# Patient Record
Sex: FEMALE | Race: WHITE | NOT HISPANIC OR LATINO | Employment: UNEMPLOYED | URBAN - METROPOLITAN AREA
[De-identification: names, ages, dates, MRNs, and addresses within clinical notes are randomized per-mention and may not be internally consistent; named-entity substitution may affect disease eponyms.]

---
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
## 2022-01-03 ENCOUNTER — OFFICE VISIT (OUTPATIENT)
Dept: DERMATOLOGY | Facility: CLINIC | Age: 22
End: 2022-01-03
Payer: COMMERCIAL

## 2022-01-03 VITALS — TEMPERATURE: 97.6 F | WEIGHT: 121 LBS | HEIGHT: 68 IN | BODY MASS INDEX: 18.34 KG/M2

## 2022-01-03 DIAGNOSIS — L21.9 SEBORRHEIC DERMATITIS: Primary | ICD-10-CM

## 2022-01-03 PROCEDURE — 99214 OFFICE O/P EST MOD 30 MIN: CPT | Performed by: DERMATOLOGY

## 2022-01-03 NOTE — PROGRESS NOTES
[facility] Follow Up Note    Patient Name: Az Wu  Encounter Date: 01/03/2022    Today's Chief Concerns:  Lizettecorrine Soriak Concern #1:  Follow up red patches       Current Medications:    Current Outpatient Medications:     cetirizine (ZyrTEC) 10 mg tablet, Take 10 mg by mouth as needed, Disp: , Rfl:     clindamycin-benzoyl peroxide (BENZACLIN) gel, In the morning as needed for spot treatment, Disp: 50 g, Rfl: 0    ibuprofen (MOTRIN) 200 mg tablet, Take 200 mg by mouth as needed, Disp: , Rfl:     tretinoin (RETIN-A) 0 025 % cream, Apply a pea size amount on the face at bed time  , Disp: 45 g, Rfl: 3    CONSTITUTIONAL:   Vitals:    01/03/22 1029   Temp: 97 6 °F (36 4 °C)   TempSrc: Temporal   Weight: 54 9 kg (121 lb)   Height: 5' 8" (1 727 m)           Specific Alerts:    Have you been seen by a [facility]atologist in the last 3 years? YES    Are you pregnant or planning to become pregnant? No    Are you currently or planning to be nursing or breast feeding? No    No Known Allergies    May we call your Preferred Phone number to discuss your specific medical information? YES    May we leave a detailed message that includes your specific medical information? YES    Have you traveled outside of the [facility] in the past 3 months? No    Do you currently have a pacemaker or defibrillator? No    Do you have any artificial heart valves, joints, plates, screws, rods, stents, pins, etc? No   - If Yes, were any placed within the last 2 years? Do you require any medications prior to a surgical procedure? No   - If Yes, for which procedure? - If Yes, what medications to you require? Are you taking any medications that cause you to bleed more easily ("blood thinners") No    Have you ever experienced a rapid heartbeat with epinephrine? No    Have you ever been treated with "gold" (gold sodium thiomalate) therapy?  No    [facility] can help with wrinkles, "laugh lines," facial volume loss, "double chin," "love handles," age spots, and more  Are you interested in learning today about some of the skin enhancement procedures that we offer? (If Yes, please provide more detail) No    Review of Systems:  Have you recently had or currently have any of the following? · Fever or chills: No  · Night Sweats: No  · Headaches: No  · Weight Gain: No  · Weight Loss: No  · Blurry Vision: No  · Nausea: No  · Vomiting: No  · Diarrhea: No  · Blood in Stool: No  · Abdominal Pain: No  · Itchy Skin: No  · Painful Joints: No  · Swollen Joints: No  · Muscle Pain: No  · Irregular Mole: No  · Sun Burn: No  · Dry Skin: YES  · Skin Color Changes: No  · Scar or Keloid: No  · Cold Sores/Fever Blisters: No  · Bacterial Infections/MRSA: No  · Anxiety: No  · Depression: No  · Suicidal or Homicidal Thoughts: No      PSYCH: Normal mood and affect  EYES: Normal conjunctiva  ENT: Normal lips and oral mucosa  CARDIOVASCULAR: No edema  RESPIRATORY: Normal respirations  HEME/LYMPH/IMMUNO:  No regional lymphadenopathy except as noted below in ASSESSMENT AND PLAN BY DIAGNOSIS    FULL ORGAN SYSTEM SKIN EXAM (SKIN)   Hair, Scalp, Ears, Face Normal except as noted below in Assessment       1  SEBORRHEIC DERMATITIS     Physical Exam:   (Anatomic Location); (Size and Morphological Description)  See photos of rash located on face   Pertinent Positives:   Pertinent Negatives: Additional History of Present Condition:  Patient notes rash usually comes and goes for a year now  Patient notes it gets worse during the winter time every winter; rash is red, irritated and sometimes cracks  Patient has tried Vaseline, Cetaphil heavy moisturizer with no improvements        Assessment and Plan:  Based on a thorough discussion of this condition and the management approach to it (including a comprehensive discussion of the known risks, side effects and potential benefits of treatment), the patient (family) agrees to implement the following specific plan:    · Recommend patient to start using prescribed ciclopirox 0 77% cream  Apply topically twice a day to nasal creases  · May continue using a heavy moisturizer daily  · Discontinue using acne medications until rash resolves so that affected area does not get more irritated  Seborrheic Dermatitis   Seborrheic dermatitis is a common, chronic or relapsing form of eczema/dermatitis that mainly affects the sebaceous, gland-rich regions of the scalp, face, and trunk  There are infantile and adult forms of seborrhoeic dermatitis  It is sometimes associated with psoriasis and, in that clinical scenario, may be referred to as "sebo-psoriasis "  Seborrheic dermatitis is also known as "seborrheic eczema "  Dandruff (also called "pityriasis capitis") is an uninflamed form of seborrhoeic dermatitis  Dandruff presents as bran-like scaly patches scattered within hair-bearing areas of the scalp  In an infant, this condition may be referred to as "cradle cap "  The cause of seborrheic dermatitis is not completely understood  It is associated with proliferation of various species of the skin commensal Malassezia, in its yeast (non-pathogenic) form  Its metabolites (such as the fatty acids oleic acid, malssezin, and indole-3-carbaldehyde) may cause an inflammatory reaction  Differences in skin barrier lipid content and function may account for individual presentations  Infantile Seborrheic Dermatitis  Infantile seborrheic dermatitis affects babies under the age of 1 months and usually resolves by 1012 months of age  Infantile seborrheic dermatitis causes "cradle cap" (diffuse, greasy scaling on scalp)  The rash may spread to affect armpit and groin folds (a type of "napkin dermatitis")  There may be associated salmon-pink colored patches that may flake or peel    The rash in this case is usually not especially itchy, so the baby often appears undisturbed by the rash, even when more generalized  Adult Seborrheic Dermatitis  Adult seborrheic dermatitis tends to begin in late adolescence; prevalence is greatest in young adults and in the elderly  It is more common in males than in females  The following factors are sometimes associated with severe adult seborrheic dermatitis:   Oily skin   Familial tendency to seborrhoeic dermatitis or a family history of psoriasis   Immunosuppression: organ transplant recipient, human immunodeficiency virus (HIV) infection and patients with lymphoma   Neurological and psychiatric diseases: Parkinson disease, tardive dyskinesia, depression, epilepsy, facial nerve palsy, spinal cord injury and congenital disorders such as Down syndrome   Treatment for psoriasis with psoralen and ultraviolet A (PUVA) therapy   Lack of sleep   Stressful events  In adults, seborrheic dermatitis may typically affect the scalp, face (creases around the nose, behind ears, within eyebrows) and upper trunk  Typical clinical features include:   Winter flares, improving in summer following sun exposure   Minimal itch most of the time   Combination oily and dry mid-facial skin   Ill-defined localized scaly patches or diffuse scale in the scalp   Blepharitis; scaly red eyelid margins   Brunswick-pink, thin, scaly, and ill-defined plaques in skin folds on both sides of the face   Petal or ring-shaped flaky patches on hair-line and on anterior chest   Rash in armpits, under the breasts, in the groin folds and genital creases   Superficial folliculitis (inflamed hair follicles) on cheeks and upper trunk    Seborrheic dermatitis is diagnosed by its clinical appearance and behavior  Skin biopsy may be helpful but is rarely necessary to make this diagnosis        Scribe Attestation    I,:  Lakhwinder Morelos MA am acting as a scribe while in the presence of the attending physician :       I,:  Bobbi Obrien MD personally performed the services described in this documentation as scribed in my presence :

## 2022-01-03 NOTE — PATIENT INSTRUCTIONS
SEBORRHEIC DERMATITIS     Assessment and Plan:  Based on a thorough discussion of this condition and the management approach to it (including a comprehensive discussion of the known risks, side effects and potential benefits of treatment), the patient (family) agrees to implement the following specific plan:    · Recommend patient to start using prescribed ciclopirox 0 77% cream  Apply topically twice a day to nasal creases  · May continue using a heavy moisturizer daily  · Discontinue using acne medications until rash resolves so that affected area does not get more irritated  Seborrheic Dermatitis   Seborrheic dermatitis is a common, chronic or relapsing form of eczema/dermatitis that mainly affects the sebaceous, gland-rich regions of the scalp, face, and trunk  There are infantile and adult forms of seborrhoeic dermatitis  It is sometimes associated with psoriasis and, in that clinical scenario, may be referred to as "sebo-psoriasis "  Seborrheic dermatitis is also known as "seborrheic eczema "  Dandruff (also called "pityriasis capitis") is an uninflamed form of seborrhoeic dermatitis  Dandruff presents as bran-like scaly patches scattered within hair-bearing areas of the scalp  In an infant, this condition may be referred to as "cradle cap "  The cause of seborrheic dermatitis is not completely understood  It is associated with proliferation of various species of the skin commensal Malassezia, in its yeast (non-pathogenic) form  Its metabolites (such as the fatty acids oleic acid, malssezin, and indole-3-carbaldehyde) may cause an inflammatory reaction  Differences in skin barrier lipid content and function may account for individual presentations  Infantile Seborrheic Dermatitis  Infantile seborrheic dermatitis affects babies under the age of 1 months and usually resolves by 1012 months of age  Infantile seborrheic dermatitis causes "cradle cap" (diffuse, greasy scaling on scalp)   The rash may spread to affect armpit and groin folds (a type of "napkin dermatitis")  There may be associated salmon-pink colored patches that may flake or peel  The rash in this case is usually not especially itchy, so the baby often appears undisturbed by the rash, even when more generalized  Adult Seborrheic Dermatitis  Adult seborrheic dermatitis tends to begin in late adolescence; prevalence is greatest in young adults and in the elderly  It is more common in males than in females  The following factors are sometimes associated with severe adult seborrheic dermatitis:   Oily skin   Familial tendency to seborrhoeic dermatitis or a family history of psoriasis   Immunosuppression: organ transplant recipient, human immunodeficiency virus (HIV) infection and patients with lymphoma   Neurological and psychiatric diseases: Parkinson disease, tardive dyskinesia, depression, epilepsy, facial nerve palsy, spinal cord injury and congenital disorders such as Down syndrome   Treatment for psoriasis with psoralen and ultraviolet A (PUVA) therapy   Lack of sleep   Stressful events  In adults, seborrheic dermatitis may typically affect the scalp, face (creases around the nose, behind ears, within eyebrows) and upper trunk  Typical clinical features include:   Winter flares, improving in summer following sun exposure   Minimal itch most of the time   Combination oily and dry mid-facial skin   Ill-defined localized scaly patches or diffuse scale in the scalp   Blepharitis; scaly red eyelid margins   [place]-pink, thin, scaly, and ill-defined plaques in skin folds on both sides of the face   Petal or ring-shaped flaky patches on hair-line and on anterior chest   Rash in armpits, under the breasts, in the groin folds and genital creases   Superficial folliculitis (inflamed hair follicles) on cheeks and upper trunk    Seborrheic dermatitis is diagnosed by its clinical appearance and behavior   Skin biopsy may be helpful but is rarely necessary to make this diagnosis

## 2022-11-04 ENCOUNTER — TELEMEDICINE (OUTPATIENT)
Dept: DERMATOLOGY | Facility: CLINIC | Age: 22
End: 2022-11-04

## 2022-11-04 DIAGNOSIS — L20.89 OTHER ATOPIC DERMATITIS: Primary | ICD-10-CM

## 2022-11-04 RX ORDER — TACROLIMUS 1 MG/G
OINTMENT TOPICAL
Qty: 100 G | Refills: 2 | Status: SHIPPED | OUTPATIENT
Start: 2022-11-04

## 2022-11-04 NOTE — PROGRESS NOTES
Virtual Regular Visit    Verification of patient location:    Patient is located in the following state in which I hold an active license [place]      Assessment/Plan:    Problem List Items Addressed This Visit    None              Reason for visit is   Chief Complaint   Patient presents with   • Virtual Regular Visit        Encounter provider Jenaro Davis MD    Provider located at [address] [place] [address] [address] [place] [address]  [phone]      Recent Visits  No visits were found meeting these conditions  Showing recent visits within past 7 days and meeting all other requirements  Future Appointments  No visits were found meeting these conditions  Showing future appointments within next 150 days and meeting all other requirements       The patient was identified by name and date of birth  Myriam Serrano was informed that this is a telemedicine visit and that the visit is being conducted through the [address] [employer] platform  She agrees to proceed     My office door was closed  No one else was in the room  She acknowledged consent and understanding of privacy and security of the video platform  The patient has agreed to participate and understands they can discontinue the visit at any time  Patient is aware this is a billable service  Subjective  Myriam Serrano is a 23 y o  female Rash on face   patient is currently using ciclopirox cream twice a day  Patient denies using benzaclin and treitnoin  HPI     No past medical history on file  Past Surgical History:   Procedure Laterality Date   • 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       Current Outpatient Medications   Medication Sig Dispense Refill   • cetirizine (ZyrTEC) 10 mg tablet Take 10 mg by mouth as needed     • ciclopirox (Ciclodan) 0 77 % cream Apply topically twice a day to affected areas of face   30 g 1   • clindamycin-benzoyl peroxide (BENZACLIN) gel In the morning as needed for spot treatment 50 g 0   • ibuprofen (MOTRIN) 200 mg tablet Take 200 mg by mouth as needed     • tretinoin (RETIN-A) 0 025 % cream Apply a pea size amount on the face at bed time  45 g 3     No current facility-administered medications for this visit  No Known Allergies    Review of Systems    Video Exam    There were no vitals filed for this visit  Physical Exam     I spent 25 minutes directly with the patient during this visit    [place] [address] [facility] Note     Patient Name: Robert Noyola  Encounter Date: 11/4/2022     Have you been cared for by a [place] [address] Dermatologist in the last 3 years and, if so, which description applies to you? Yes  I have been here within the last 3 years, and my medical history has NOT changed since that time  I am FEMALE/of child-bearing potential     REVIEW OF SYSTEMS:  Have you recently had or currently have any of the following? · No changes in my recent health  PAST MEDICAL HISTORY:  Have you personally ever had or currently have any of the following? If "YES," then please provide more detail  · No changes in my medical history  FAMILY HISTORY:  Any "first degree relatives" (parent, brother, sister, or child) with the following? • No changes in my family's known health  PATIENT EXPERIENCE:    • Do you want the Dermatologist to perform a COMPLETE skin exam today including a clinical examination under the "bra and underwear" areas? NO  • If necessary, do we have your permission to call and leave a detailed message on your Preferred Phone number that includes your specific medical information?   Yes      No Known Allergies   Current Outpatient Medications:   •  cetirizine (ZyrTEC) 10 mg tablet, Take 10 mg by mouth as needed, Disp: , Rfl:   •  ciclopirox (Ciclodan) 0 77 % cream, Apply topically twice a day to affected areas of face , Disp: 30 g, Rfl: 1  •  ibuprofen (MOTRIN) 200 mg tablet, Take 200 mg by mouth as needed, Disp: , Rfl: •  clindamycin-benzoyl peroxide (BENZACLIN) gel, In the morning as needed for spot treatment (Patient not taking: Reported on 11/4/2022), Disp: 50 g, Rfl: 0  •  tretinoin (RETIN-A) 0 025 % cream, Apply a pea size amount on the face at bed time  (Patient not taking: Reported on 11/4/2022), Disp: 45 g, Rfl: 3          • Whom besides the patient is providing clinical information about today's encounter?   o NO ADDITIONAL HISTORIAN (patient alone provided history)    Physical Exam and Assessment/Plan by Diagnosis:    ATOPIC DERMATITIS (ECZEMA)     Physical exam:  notable for xerotic plaques over chin, lateral commissures, nose, posterior hands    Assessment/Plan:   •  History and physical most consistent with atopic dermatitis, possibly with some overlap of irritant dermatitis from drool   • Educated patient on atopic dermatitis, including natural progression of disease with expected periods of quiescence and flaring  •  Discussed treatment options including general skin care recommendations, topical anti-inflammatories (steroids, calcineurin inhibitors)  • Based on a thorough discussion of this condition and the management approach to it (including a comprehensive discussion of the known risks, side effects and potential benefits of treatment), the patient (family) agrees to implement the following specific plan:        [address] [facility]  THEN APPLY A MOISTURIZER    MAINTENANCE - Apply at least daily every day to all skin even if skin is “normal,” or without scale/redness  Apply once after bath and once any other time during the day   Best to apply moisturizer to moist skin after bath (do not dry off completely before applying)    Moisturizer: Use one of the following:  • Aquaphor ointment   • Cetaphil Cream  • Vaseline petroleum jelly  • Vanicream  • Aveeno Eczema Therapy Balm   • Eucerin Cream    Apply thick layer of vaseline to lips before bed to protect from saliva    FLARING -Follow these instructions when the skin is pink or red and itchy  • · For active areas on the FACE: apply tacrolimus ointment 2 times per day, once after bath and one other time during the day  DO NOT apply to “normal” skin (skin that is not pink/itchy)  • Then apply moisturizer    • TIP For the itching/general skin care:  • Keep moisturizer in fridge  Cool is soothing! • Initiate short, lukewarm showers with gentle hydrating soaps (eucerin, cerave, vanicream)  • Avoid scratching      · IF YOU HAVE RUN OUT OF YOUR PRESCRIPTION, PLEASE CALL THE PHARMACY TO CHECK IF THERE IS A REFILL  WE OFTEN SEND MULTIPLE REFILLS

## 2022-12-02 ENCOUNTER — TELEPHONE (OUTPATIENT)
Dept: DERMATOLOGY | Facility: CLINIC | Age: 22
End: 2022-12-02

## 2022-12-02 NOTE — TELEPHONE ENCOUNTER
Patient called to r/s her appt from 12/2  Pt states can be virtual or in office  Pt is looking for appt between 12/16 and 1/8, as classes start back up on Monday, 1/9  Advised pt that I placed her on the wait list for a cancellation, but to check mychart for openings

## 2022-12-19 ENCOUNTER — HOSPITAL ENCOUNTER (OUTPATIENT)
Dept: CT IMAGING | Facility: HOSPITAL | Age: 22
Discharge: HOME | End: 2022-12-19

## 2022-12-19 DIAGNOSIS — J34.2 DEVIATED SEPTUM: ICD-10-CM

## 2022-12-30 ENCOUNTER — TELEPHONE (OUTPATIENT)
Dept: NEUROLOGY | Facility: CLINIC | Age: 22
End: 2022-12-30

## 2023-01-05 ENCOUNTER — OFFICE VISIT (OUTPATIENT)
Dept: FAMILY MEDICINE CLINIC | Facility: HOSPITAL | Age: 23
End: 2023-01-05

## 2023-01-05 VITALS
WEIGHT: 120.4 LBS | TEMPERATURE: 97 F | HEIGHT: 67 IN | SYSTOLIC BLOOD PRESSURE: 108 MMHG | OXYGEN SATURATION: 97 % | HEART RATE: 80 BPM | DIASTOLIC BLOOD PRESSURE: 62 MMHG | BODY MASS INDEX: 18.9 KG/M2

## 2023-01-05 DIAGNOSIS — K21.9 GASTROESOPHAGEAL REFLUX DISEASE, UNSPECIFIED WHETHER ESOPHAGITIS PRESENT: ICD-10-CM

## 2023-01-05 DIAGNOSIS — Z13.0 SCREENING FOR ENDOCRINE, METABOLIC AND IMMUNITY DISORDER: ICD-10-CM

## 2023-01-05 DIAGNOSIS — Z13.29 SCREENING FOR ENDOCRINE, METABOLIC AND IMMUNITY DISORDER: ICD-10-CM

## 2023-01-05 DIAGNOSIS — F41.1 GAD (GENERALIZED ANXIETY DISORDER): ICD-10-CM

## 2023-01-05 DIAGNOSIS — Z13.228 SCREENING FOR ENDOCRINE, METABOLIC AND IMMUNITY DISORDER: ICD-10-CM

## 2023-01-05 DIAGNOSIS — Z00.00 ANNUAL PHYSICAL EXAM: Primary | ICD-10-CM

## 2023-01-05 NOTE — PROGRESS NOTES
[facility] [address]     NAME: Gabi Butts  AGE: 23 y o  SEX: female  : 2000     DATE: 2023     Assessment and Plan:     Problem List Items Addressed This Visit        Digestive    GERD (gastroesophageal reflux disease)     Symptoms have improved with trigger avoidance  Continue with this  Can use OTC meds prn but if symptomatic multiple times/week then she should reach out to start daily med  Other    POWER (generalized anxiety disorder)     We discussed treatment options including therapy, meditation, mindfulness and exercise  We also discussed medication but pt is declining at this time  She will reach out if she changes her mind  Relevant Orders    TSH, 3rd generation with Free T4 reflex   Other Visit Diagnoses     Annual physical exam    -  Primary    Screening for endocrine, metabolic and immunity disorder        Relevant Orders    CBC and differential    Comprehensive metabolic panel    TSH, 3rd generation with Free T4 reflex          Immunizations and preventive care screenings were discussed with patient today  Appropriate education was printed on patient's after visit summary  Counseling:  Alcohol/drug use: discussed moderation in alcohol intake, the recommendations for healthy alcohol use, and avoidance of illicit drug use  Dental Health: discussed importance of regular tooth brushing, flossing, and dental visits  Injury prevention: discussed safety/seat belts, safety helmets, smoke detectors, carbon dioxide detectors, and smoking near bedding or upholstery  Sexual health: discussed sexually transmitted diseases, partner selection, use of condoms, avoidance of unintended pregnancy, and contraceptive alternatives  · Exercise: the importance of regular exercise/physical activity was discussed  Recommend exercise 3-5 times per week for at least 30 minutes         Depression Screening and Follow-up Plan: Patient was screened for depression during today's encounter  They screened negative with a PHQ-2 score of 2  No follow-ups on file  Chief Complaint:     Chief Complaint   Patient presents with   • Annual Exam      History of Present Illness:     Adult Annual Physical   Patient here for a comprehensive physical exam      Diet and Physical Activity  · Diet/Nutrition: well balanced diet  · Exercise: no formal exercise  Depression Screening  PHQ-2/9 Depression Screening    Little interest or pleasure in doing things: 1 - several days  Feeling down, depressed, or hopeless: 1 - several days  PHQ-2 Score: 2  PHQ-2 Interpretation: Negative depression screen       General Health  · Sleep: sleeps well  · Hearing: normal - bilateral   · Vision: goes for regular eye exams and wears contacts  · Dental: regular dental visits  /GYN Health  · Last menstrual period: 1/1/2023  Periods last 7 days  Follows with GYN  · Contraceptive method: abstinence  · History of STDs?: no      Review of Systems:     Review of Systems   Constitutional: Negative  HENT: Negative  Eyes: Negative  Respiratory: Negative  Cardiovascular: Negative  Gastrointestinal: Negative  Reflux   Endocrine: Negative  Genitourinary: Negative  Musculoskeletal: Negative  Skin: Negative  Allergic/Immunologic: Negative  Neurological: Positive for headaches  Negative for dizziness, tremors, seizures, syncope, facial asymmetry, speech difficulty, weakness, light-headedness and numbness  Hematological: Negative  Psychiatric/Behavioral: Positive for dysphoric mood  Negative for sleep disturbance and suicidal ideas  The patient is nervous/anxious  Past Medical History:     History reviewed  No pertinent past medical history     Past Surgical History:     Past Surgical History:   Procedure Laterality Date   • 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      Social History:     Social History     Socioeconomic History   • Marital status: Single     Spouse name: None   • Number of children: None   • Years of education: None   • Highest education level: None   Occupational History   • None   Tobacco Use   • Smoking status: Never   • Smokeless tobacco: Never   Vaping Use   • Vaping Use: Never used   Substance and Sexual Activity   • Alcohol use: Never   • Drug use: Never   • Sexual activity: Never   Other Topics Concern   • None   Social History Narrative   • None     Social Determinants of Health     Financial Resource Strain: Not on file   Food Insecurity: Not on file   Transportation Needs: Not on file   Physical Activity: Not on file   Stress: Not on file   Social Connections: Not on file   Intimate Partner Violence: Not on file   Housing Stability: Not on file      Family History:     Family History   Problem Relation Age of Onset   • Thyroid disease Mother    • No Known Problems Father    • Hypertension Maternal Grandmother    • Hypertension Maternal Grandfather       Current Medications:     No current outpatient medications on file  No current facility-administered medications for this visit  Allergies:     No Known Allergies   Physical Exam:     /62 (BP Location: Left arm, Patient Position: Sitting, Cuff Size: Standard)   Pulse 80   Temp (!) 97 °F (36 1 °C) (Tympanic)   Ht 5' 7" (1 702 m)   Wt 54 6 kg (120 lb 6 4 oz)   SpO2 97%   BMI 18 86 kg/m²     Physical Exam  Vitals reviewed  Constitutional:       Appearance: Normal appearance  She is normal weight  HENT:      Head: Normocephalic and atraumatic  Right Ear: Tympanic membrane, ear canal and external ear normal       Left Ear: Tympanic membrane, ear canal and external ear normal       Nose: Nose normal       Mouth/Throat:      Mouth: Mucous membranes are moist       Pharynx: Oropharynx is clear     Eyes:      Conjunctiva/sclera: Conjunctivae normal       Pupils: Pupils are equal, round, and reactive to light  Neck:      Thyroid: No thyromegaly  Cardiovascular:      Rate and Rhythm: Normal rate and regular rhythm  Heart sounds: Normal heart sounds  No murmur heard  Pulmonary:      Effort: Pulmonary effort is normal       Breath sounds: Normal breath sounds  Abdominal:      General: Abdomen is flat  Bowel sounds are normal       Palpations: Abdomen is soft  There is no hepatomegaly or splenomegaly  Tenderness: There is no abdominal tenderness  Musculoskeletal:         General: Normal range of motion  Cervical back: Normal range of motion and neck supple  Skin:     General: Skin is warm and dry  Capillary Refill: Capillary refill takes less than 2 seconds  Neurological:      General: No focal deficit present  Mental Status: She is alert and oriented to person, place, and time  Psychiatric:         Mood and Affect: Affect is tearful  Behavior: Behavior normal          Thought Content:  Thought content normal          Judgment: Judgment normal           Gwendolyn Ferrara, [address] [place] [id number]

## 2023-01-05 NOTE — PROGRESS NOTES
Assessment/Plan:      Diagnoses and all orders for this visit:    Annual physical exam    POWER (generalized anxiety disorder)  -     TSH, 3rd generation with Free T4 reflex; Future  -     TSH, 3rd generation with Free T4 reflex    Gastroesophageal reflux disease, unspecified whether esophagitis present    Screening for endocrine, metabolic and immunity disorder  -     CBC and differential; Future  -     Comprehensive metabolic panel; Future  -     TSH, 3rd generation with Free T4 reflex; Future  -     CBC and differential  -     Comprehensive metabolic panel  -     TSH, 3rd generation with Free T4 reflex          Subjective:     Patient ID: Amelia Mitchell is a 23 y o  female  Started with GI issues with acid reflux  Started a few months ago  Found herself not eating at [employer]  Then ended up with constipation  Has lost weight  Was 125 before going away to school  Since being home for break she has aquino some weight back and has been eating more now that she is home  Started Prilosec while at school  Symptoms improved  Did 14 day course  Still with occasional heartburn but not consistent  Has been dealing with headaches and migraines since the summer  Seeing neurologist tomorrow  Also saw ENT to r/o sinus issues  Had CT which showed deviated septum  She was taking a lot of Advil for these HA  Has a lot of anxiety  She feels it is r/t school  Tried therapy at school and did not really like the therapist  Does feel anxiety is affecting her daily life  Feels it has progressed the last few years  Feels slightly depressed  Not hopeless  descrines as down and out  Parents are very supportive  She does not want to be medicated  Review of Systems   Constitutional: Negative  HENT: Negative  Eyes: Negative  Respiratory: Negative  Cardiovascular: Negative  Gastrointestinal: Negative  Reflux   Endocrine: Negative  Genitourinary: Negative  Musculoskeletal: Negative      Skin: Negative  Neurological: Positive for headaches  Negative for dizziness, tremors, seizures, syncope, facial asymmetry, speech difficulty, weakness, light-headedness and numbness  Hematological: Negative  Psychiatric/Behavioral: Positive for dysphoric mood  Negative for sleep disturbance and suicidal ideas  The patient is nervous/anxious  The following portions of the patient's history were reviewed and updated as appropriate: allergies, current medications, past family history, past medical history, past social history, past surgical history and problem list     Objective:  Vitals:    01/05/23 1050   BP: 108/62   Pulse: 80   Temp: (!) 97 °F (36 1 °C)   SpO2: 97%      Physical Exam  Vitals reviewed  Constitutional:       Appearance: Normal appearance  She is well-developed and normal weight  HENT:      Head: Normocephalic and atraumatic  Right Ear: Tympanic membrane, ear canal and external ear normal       Left Ear: Tympanic membrane, ear canal and external ear normal       Nose: Nose normal       Mouth/Throat:      Mouth: Mucous membranes are moist       Pharynx: Oropharynx is clear  Eyes:      Conjunctiva/sclera: Conjunctivae normal       Pupils: Pupils are equal, round, and reactive to light  Neck:      Thyroid: No thyromegaly  Cardiovascular:      Rate and Rhythm: Normal rate and regular rhythm  Heart sounds: Normal heart sounds  No murmur heard  Pulmonary:      Effort: Pulmonary effort is normal       Breath sounds: Normal breath sounds  Abdominal:      General: Abdomen is flat  Bowel sounds are normal       Palpations: Abdomen is soft  There is no hepatomegaly  Tenderness: There is no abdominal tenderness  Musculoskeletal:         General: Normal range of motion  Cervical back: Normal range of motion and neck supple  Lymphadenopathy:      Cervical: No cervical adenopathy  Skin:     General: Skin is warm and dry     Neurological:      Mental Status: She is alert and oriented to person, place, and time  Psychiatric:         Mood and Affect: Affect is tearful  Behavior: Behavior normal          Thought Content:  Thought content normal          Judgment: Judgment normal

## 2023-01-05 NOTE — ASSESSMENT & PLAN NOTE
Symptoms have improved with trigger avoidance  Continue with this  Can use OTC meds prn but if symptomatic multiple times/week then she should reach out to start daily med

## 2023-01-05 NOTE — ASSESSMENT & PLAN NOTE
We discussed treatment options including therapy, meditation, mindfulness and exercise  We also discussed medication but pt is declining at this time  She will reach out if she changes her mind

## 2023-01-05 NOTE — PATIENT INSTRUCTIONS

## 2023-01-06 ENCOUNTER — OFFICE VISIT (OUTPATIENT)
Dept: NEUROLOGY | Facility: CLINIC | Age: 23
End: 2023-01-06

## 2023-01-06 VITALS
HEART RATE: 78 BPM | SYSTOLIC BLOOD PRESSURE: 96 MMHG | WEIGHT: 120.4 LBS | TEMPERATURE: 97.7 F | BODY MASS INDEX: 18.86 KG/M2 | DIASTOLIC BLOOD PRESSURE: 58 MMHG

## 2023-01-06 DIAGNOSIS — G43.109 MIGRAINE WITH AURA AND WITHOUT STATUS MIGRAINOSUS, NOT INTRACTABLE: Primary | ICD-10-CM

## 2023-01-06 DIAGNOSIS — F41.9 ANXIETY DISORDER: ICD-10-CM

## 2023-01-06 DIAGNOSIS — M54.2 CERVICALGIA: ICD-10-CM

## 2023-01-06 DIAGNOSIS — G47.9 SLEEP DIFFICULTIES: ICD-10-CM

## 2023-01-06 LAB
ALBUMIN SERPL-MCNC: 4.6 G/DL (ref 3.9–5)
ALBUMIN/GLOB SERPL: 1.8 {RATIO} (ref 1.2–2.2)
ALP SERPL-CCNC: 56 IU/L (ref 42–106)
ALT SERPL-CCNC: 26 IU/L (ref 0–32)
AST SERPL-CCNC: 25 IU/L (ref 0–40)
BASOPHILS # BLD AUTO: 0.1 X10E3/UL (ref 0–0.2)
BASOPHILS NFR BLD AUTO: 2 %
BILIRUB SERPL-MCNC: 0.3 MG/DL (ref 0–1.2)
BUN SERPL-MCNC: 20 MG/DL (ref 6–20)
BUN/CREAT SERPL: 25 (ref 9–23)
CALCIUM SERPL-MCNC: 9.3 MG/DL (ref 8.7–10.2)
CHLORIDE SERPL-SCNC: 106 MMOL/L (ref 96–106)
CO2 SERPL-SCNC: 21 MMOL/L (ref 20–29)
CREAT SERPL-MCNC: 0.81 MG/DL (ref 0.57–1)
EGFR: 107 ML/MIN/1.73
EOSINOPHIL # BLD AUTO: 0.2 X10E3/UL (ref 0–0.4)
EOSINOPHIL NFR BLD AUTO: 5 %
ERYTHROCYTE [DISTWIDTH] IN BLOOD BY AUTOMATED COUNT: 12.4 % (ref 11.7–15.4)
GLOBULIN SER-MCNC: 2.6 G/DL (ref 1.5–4.5)
GLUCOSE SERPL-MCNC: 89 MG/DL (ref 70–99)
HCT VFR BLD AUTO: 42 % (ref 34–46.6)
HGB BLD-MCNC: 14.3 G/DL (ref 11.1–15.9)
IMM GRANULOCYTES # BLD: 0 X10E3/UL (ref 0–0.1)
IMM GRANULOCYTES NFR BLD: 0 %
LYMPHOCYTES # BLD AUTO: 2 X10E3/UL (ref 0.7–3.1)
LYMPHOCYTES NFR BLD AUTO: 42 %
MCH RBC QN AUTO: 29.5 PG (ref 26.6–33)
MCHC RBC AUTO-ENTMCNC: 34 G/DL (ref 31.5–35.7)
MCV RBC AUTO: 87 FL (ref 79–97)
MONOCYTES # BLD AUTO: 0.4 X10E3/UL (ref 0.1–0.9)
MONOCYTES NFR BLD AUTO: 9 %
NEUTROPHILS # BLD AUTO: 1.9 X10E3/UL (ref 1.4–7)
NEUTROPHILS NFR BLD AUTO: 42 %
PLATELET # BLD AUTO: 239 X10E3/UL (ref 150–450)
POTASSIUM SERPL-SCNC: 4.6 MMOL/L (ref 3.5–5.2)
PROT SERPL-MCNC: 7.2 G/DL (ref 6–8.5)
RBC # BLD AUTO: 4.84 X10E6/UL (ref 3.77–5.28)
SODIUM SERPL-SCNC: 139 MMOL/L (ref 134–144)
TSH SERPL DL<=0.005 MIU/L-ACNC: 3.84 UIU/ML (ref 0.45–4.5)
WBC # BLD AUTO: 4.6 X10E3/UL (ref 3.4–10.8)

## 2023-01-06 RX ORDER — RIZATRIPTAN BENZOATE 10 MG/1
10 TABLET, ORALLY DISINTEGRATING ORAL ONCE AS NEEDED
Qty: 10 TABLET | Refills: 3 | Status: SHIPPED | OUTPATIENT
Start: 2023-01-06

## 2023-01-06 RX ORDER — VENLAFAXINE HYDROCHLORIDE 37.5 MG/1
37.5 CAPSULE, EXTENDED RELEASE ORAL
Qty: 30 CAPSULE | Refills: 4 | Status: SHIPPED | OUTPATIENT
Start: 2023-01-06

## 2023-01-06 RX ORDER — IBUPROFEN 400 MG/1
TABLET ORAL EVERY 6 HOURS PRN
COMMUNITY

## 2023-01-06 RX ORDER — ACETAMINOPHEN 325 MG/1
650 TABLET ORAL EVERY 6 HOURS PRN
COMMUNITY

## 2023-01-06 NOTE — PROGRESS NOTES
[facility] Consult  PATIENT:  Cliff Poole  MRN:  [id number]  :  2000  DATE OF SERVICE:  2023  REFERRED BY: Self, Referral  PMD: SHERI Dasilva    Assessment/Plan:     Clfif Poole is a delightful 23 y o  female with a past medical history that includes GERD, POWER referred here for evaluation of headache  Initial evaluation 2023     Ms Carly Mcguire reports a history of headaches that started around 2022  The description of her headaches sound migrainous and she has a strong family history suggestive of migraines as well  I believe there are multiple contributing factors to her headaches including the genetic component, stress, anxiety and sleep difficulties  She is currently starting biomedical engineering at [employer]  We discussed potential treatment options, but thought that venlafaxine would be a good choice given her difficulties with sleep and mood  From an abortive standpoint, I have encouraged her to decrease her use of Tylenol throughout the week and I will prescribe Maxalt for abortive management  At this time based on a normal neurological exam and no significant red flags, I do not think brain imaging is warranted  If we have difficulty treating her headaches going forward, I will obtain an MRI of the brain for further evaluation  Migraine with aura and without status migrainosus, not intractable  -     venlafaxine (EFFEXOR-XR) 37 5 mg 24 hr capsule; Take 1 capsule (37 5 mg total) by mouth daily at bedtime  -     rizatriptan (MAXALT-MLT) 10 mg disintegrating tablet; Take 1 tablet (10 mg total) by mouth once as needed for migraine for up to 1 dose May repeat in 2 hours if needed  Max 2 tablets in 24 hours    Anxiety disorder  -     venlafaxine (EFFEXOR-XR) 37 5 mg 24 hr capsule; Take 1 capsule (37 5 mg total) by mouth daily at bedtime    Cervicalgia  -     Ambulatory Referral to Physical Therapy;  Future    Sleep difficulties  -     venlafaxine (EFFEXOR-XR) 37 5 mg 24 hr capsule; Take 1 capsule (37 5 mg total) by mouth daily at bedtime      Workup:  - Neurologic assessment reveals unremarkable neurological exam   - With no new or concerning symptoms, no red flags and an unremarkable neurologic exam, there is no specific indication for further evaluation with MRI brain  However, this could be obtained at any time if indicated  Preventative:  - we discussed headache hygiene and lifestyle factors that may improve headaches  - Venlafaxine 37 5mg HS  - Currently on through other providers: None  - Past/ failed/contraindicated: Mg (did not like how it made her feel, but willing to try again), avoid anti-hypertensive's due to baseline low vitals  - future options: TCA, CGRP med, botox    Acute:  - discussed not taking over-the-counter or prescription pain medications more than 3 days per week to prevent medication overuse/rebound headache  - Maxalt 10mg ODT  - Currently on through other providers: None  - Past/ failed/contraindicated: None  - future options:  Triptan, prochlorperazine, Toradol IM or p o , could consider trial of 5 days of Depakote 500 mg nightly or dexamethasone 2 mg daily for prolonged migraine, anila Diop nurtec  Patient instructions   Referrals:  Physical therapy    Headache Calendar  Please maintain a headache calendar  Consider using phone applications such as [facility] [place] or [place] Migraine Tracker    Headache/migraine treatment:   Acute medications (for immediate treatment of a headache): It is ok to take ibuprofen, acetaminophen or naproxen (Advil, Tylenol,  Aleve, Excedrin) if they help your headaches you should limit these to No more than 2-3 times a week to avoid medication overuse/rebound headaches  For your more moderate to severe migraines take this medication early  Maxalt (rizatriptan) 10mg tabs - take one at the onset of headache   May repeat one time after 2 hours if pain has not resolved  (Max 2 a day and 10 a month)     Prescription preventive medications for headaches/migraines   (to take every day to help prevent headaches - not to take at the time of headache):  [x] Venlafaxine  Take 1 tablet (37 5mg) at bedtime nightly  - Okay to combine with food or snack for nausea  - Okay to adjust timing to slightly earlier in the evening if feeling drowsy in the morning    *Typically these types of medications take time until you see the benefit, although some may see improvement in days, often it may take weeks, especially if the medication is being titrated up to a beneficial level  Please contact us if there are any concerns or questions regarding the medication  Lifestyle Recommendations:  [x] SLEEP - Maintain a regular sleep schedule: Adults need at least 7-8 hours of uninterrupted a night  Maintain good sleep hygiene:  Going to bed and waking up at consistent times, avoiding excessive daytime naps, avoiding caffeinated beverages in the evening, avoid excessive stimulation in the evening and generally using bed primarily for sleeping  One hour before bedtime would recommend turning lights down lower, decreasing your activity (may read quietly, listen to music at a low volume)  When you get into bed, should eliminate all technology (no texting, emailing, playing with your phone, iPad or tablet in bed)  [x] HYDRATION - Maintain good hydration  Drink  2L of fluid a day (4 typical small water bottles)  [x] DIET - Maintain good nutrition  In particular don't skip meals and try and eat healthy balanced meals regularly  [x] TRIGGERS - Look for other triggers and avoid them: Limit caffeine to 1-2 cups a day or less  Avoid dietary triggers that you have noticed bring on your headaches (this could include aged cheese, peanuts, MSG, aspartame and nitrates)    [x] EXERCISE - physical exercise as we all know is good for you in many ways, and not only is good for your heart, but also is beneficial for your mental health, cognitive health and  chronic pain/headaches  I would encourage at the least 5 days of physical exercise weekly for at least 30 minutes  Education and Follow-up  [x] Please call with any questions or concerns  Of course if any new concerning symptoms go to the emergency department  [x] Follow up in 4 months  CC: We had the pleasure of evaluating Gerda Cardoza in neurological consultation today  Gerda Cardoza is a right handed female who presents today for evaluation of headaches  History obtained from patient as well as available medical record review  History of Present Illness:   Current medical illnesses  or past medical history include seasonal allergies, GERD, POWER  History of headaches but now pain and consistency has increased  As a child headaches associated with exposure to heat  Headaches started at what age? 23years old, onset July 2022   How often do the headaches occur?   - as of 1/6/2023: about 3 times a week since July (approximately 15/30)  What time of the day do the headaches start? Okay in the morning  Typically occur mid aftternoon progress towards the night   How long do the headaches last? If they start at night takes something and goes to bed, will be gone by the morning  May have a residual feeling when she wakes up  Overall half a day to a day  Are you ever headache free? Yes    Aura? with aura, Had one episode when she was sitting in class and all of a sudden her eyes started to feel "fuzzy" followed by headache pain  She then felt like she was moving in slow motion  Where is your headache located and pain quality? Location varies  Initially it was bandlike, with squeezing at temples and posteriorly, has progressed more towards cervical area towards the skull base (this is most frequent)  Radiates into trapezius  Feels like she carries a lot of tension in this area from studying    At times, has unilateral pressure either R or L eye and pressure athe sides and bridge of your nose  What is the intensity of pain? Worst 10/10, Average: 5-8/10  Associated symptoms:   [x] Nausea- very infrequent  [x] Problems with concentration  [x] Photophobia  [] Blurred vision - no blurred, rare fuzzy feeling   [x] Prefer quiet, dark room  [] Light-headed or dizzy -  More disoriented than dizzy   [x] Hands or feet tingle or feel numb/paresthesias  - one episode with numbing sensation of the hand  Isolated to the L hand     [x] Nasal congestion, no rhinorrhea- has a deviated septum on her L side    Things that make the headache worse? Made worse by bending over, daily activity, going to class and concentrating  Headache triggers:  Stress    Have you seen someone else for headaches or pain? Yes, ENT for deviated septum   Have you had trigger point injection performed and how often? No  Have you had Botox injection performed and how often? No   Have you had epidural injections or transforaminal injections performed? No  Are you current pregnant or planning on getting pregnant? NO  Have you ever had any Brain imaging? No    Last eye exam: Last year, follows regularly     What medications do you take or have you taken for your headaches? ABORTIVE:    OTC medications: Takes more tylenol than Advil; Tylenol helps  Takes extra strength 500, 1 dose a day 3-5 times a week  In one day max, two doses; Ibuprofen; Advil cold and sinus when they believe headaches are associated with sinus     Prescription: None    Past/ failed/contraindicated:  OTC medications: None  Prescription: None    PREVENTIVE: Elderberry, Vitamin D, Vitamin B12, Zinc, Vitamin C     Past/ failed/contraindicated:  Mg (did not like how it made her feel), avoid anti-hypertensive's due to baseline low vitals      LIFESTYLE  Sleep   - averages: 5-8 hours per night  Problems falling asleep?:   Yes  Problems staying asleep?:  No  Does not wake up well rested   Mother has noted that she is tired all the time  Mother believes she sleeps a lot  Physical activity: No regular exercise regimen     Water: a bottle per day  Caffeine:  Coffee infrequent, maybe on the weekends    Mood: Anxiety from school and life stressors  The following portions of the patient's history were reviewed and updated as appropriate: allergies, current medications, past family history, past medical history, past social history, past surgical history and problem list     Pertinent family history:  Family history of headaches:  Father with history of sinus headaches, maternal grandmother with history of migraines  Any family history of aneurysms - No    Pertinent social history:  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, 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   Education: In college studying biomedical engineering   Lives in a dorm  At home lives with mother and dog    Illicit Drugs: denies  Alcohol/tobacco: Denies alcohol use, Denies tobacco use    Past Medical History:   History reviewed  No pertinent past medical history  Patient Active Problem List   Diagnosis   • POWER (generalized anxiety disorder)   • GERD (gastroesophageal reflux disease)       Medications:      Current Outpatient Medications   Medication Sig Dispense Refill   • acetaminophen (TYLENOL) 325 mg tablet Take 650 mg by mouth every 6 (six) hours as needed for mild pain     • Cetirizine HCl (ZYRTEC ALLERGY PO) Take by mouth     • ibuprofen (MOTRIN) 400 mg tablet Take by mouth every 6 (six) hours as needed for mild pain       No current facility-administered medications for this visit          Allergies:    No Known Allergies    Family History:     Family History   Problem Relation Age of Onset   • Thyroid disease Mother    • No Known Problems Father    • Hypertension Maternal Grandmother    • Hypertension Maternal Grandfather        Social History:       Social History     Socioeconomic History   • Marital status: Single     Spouse name: Not on file   • Number of children: Not on file   • Years of education: Not on file   • Highest education level: Not on file   Occupational History   • Not on file   Tobacco Use   • Smoking status: Never   • Smokeless tobacco: Never   Vaping Use   • Vaping Use: Never used   Substance and Sexual Activity   • Alcohol use: Never   • Drug use: Never   • Sexual activity: Never   Other Topics Concern   • Not on file   Social History Narrative   • Not on file     Social Determinants of Health     Financial Resource Strain: Not on file   Food Insecurity: Not on file   Transportation Needs: Not on file   Physical Activity: Not on file   Stress: Not on file   Social Connections: Not on file   Intimate Partner Violence: Not on file   Housing Stability: Not on file     Objective:   Physical Exam:                                                                 Vitals:            Constitutional:    BP 96/58 (BP Location: Right arm, Patient Position: Sitting, Cuff Size: Adult)   Pulse 78   Temp 97 7 °F (36 5 °C) (Temporal)   Wt 54 6 kg (120 lb 6 4 oz)   BMI 18 86 kg/m²   BP Readings from Last 3 Encounters:   01/06/23 96/58   01/05/23 108/62   12/27/21 118/62     Pulse Readings from Last 3 Encounters:   01/06/23 78   01/05/23 80         Well developed, well nourished, well groomed  No dysmorphic features  HEENT:  Normocephalic atraumatic  Oropharynx is clear and moist  No oral mucosal lesions  Chest:  Respirations regular and unlabored  Cardiovascular:  Distal extremities warm without palpable edema or tenderness, no observed significant swelling  Musculoskeletal:  (see below under neurologic exam for evaluation of motor function and gait)   Skin:  warm and dry, not diaphoretic  No apparent birthmarks or stigmata of neurocutaneous disease  Psychiatric:  Normal behavior and appropriate affect       Neurological Examination:     Mental status/cognitive function:   Orientated to time, place and person  Recent and remote memory intact  Attention span and concentration as well as fund of knowledge are appropriate for age  Normal language and spontaneous speech  Cranial Nerves:  II-visual fields full  Fundi poorly visualized due to pupillary constriction  III, IV, VI-Pupils were equal, round, and reactive to light and accomodation  Extraocular movements were full and conjugate without nystagmus  Conjugate gaze, normal smooth pursuits, normal saccades   V-facial sensation symmetric  VII-facial expression symmetric, intact forehead wrinkle, strong eye closure, symmetric smile    VIII-hearing grossly intact bilaterally   IX, X-palate elevation symmetric, no dysarthria  XI-shoulder shrug strength intact    XII-tongue protrusion midline  Motor Exam: symmetric bulk and tone throughout, no pronator drift  Power/strength 5/5 bilateral upper and lower extremities, no atrophy, fasciculations or abnormal movements noted  Sensory: grossly intact light touch in all extremities  Reflexes: brachioradialis 2+, biceps 2+, knee 2+, ankle 2+ bilaterally  No ankle clonus  Coordination: Finger nose finger intact bilaterally, no apparent dysmetria, ataxia or tremor noted  Gait: steady casual and tandem gait  Pertinent lab results: None     Pertinent Imaging: None  Review of Systems:   Constitutional: Positive for fatigue (day after feel fatigued)  Negative for appetite change and fever  HENT: Negative  Negative for hearing loss, tinnitus, trouble swallowing and voice change  Eyes: Positive for photophobia (with severe headaches), pain and visual disturbance (blurry vision, one episode)  Respiratory: Negative  Negative for shortness of breath  Cardiovascular: Negative  Negative for palpitations  Gastrointestinal: Positive for nausea (sometimes)  Negative for vomiting  Endocrine: Negative  Negative for cold intolerance  Genitourinary: Negative  Negative for dysuria, frequency and urgency     Musculoskeletal: Positive for myalgias, neck pain and neck stiffness  Negative for gait problem  Skin: Negative  Negative for rash  Allergic/Immunologic: Negative  Neurological: Positive for light-headedness, numbness (tingling in left arm on occassion, tingling in neck) and headaches  Negative for dizziness, tremors, seizures, syncope, facial asymmetry, speech difficulty and weakness  Hematological: Negative  Does not bruise/bleed easily  Psychiatric/Behavioral: Positive for sleep disturbance (hard time falling asleep with severe headaches)  Negative for confusion and hallucinations  All other systems reviewed and are negative  I have spent 20 minutes with the patient today in which greater than 50% of this time was spent in counseling/coordination of care regarding Prognosis, Risks and benefits of tx options, Importance of tx compliance and Impressions  I also spent 10 minutes non face to face for this patient the same day         Author:  Andrew Sweeney DO 1/6/2023 2:49 PM

## 2023-01-06 NOTE — PROGRESS NOTES
Review of Systems   Constitutional: Positive for fatigue (day after feel fatigued)  Negative for appetite change and fever  HENT: Negative  Negative for hearing loss, tinnitus, trouble swallowing and voice change  Eyes: Positive for photophobia (with severe headaches), pain and visual disturbance (blurry vision, one episode)  Respiratory: Negative  Negative for shortness of breath  Cardiovascular: Negative  Negative for palpitations  Gastrointestinal: Positive for nausea (sometimes)  Negative for vomiting  Endocrine: Negative  Negative for cold intolerance  Genitourinary: Negative  Negative for dysuria, frequency and urgency  Musculoskeletal: Positive for myalgias, neck pain and neck stiffness  Negative for gait problem  Skin: Negative  Negative for rash  Allergic/Immunologic: Negative  Neurological: Positive for light-headedness, numbness (tingling in left arm on occassion, tingling in neck) and headaches  Negative for dizziness, tremors, seizures, syncope, facial asymmetry, speech difficulty and weakness  Hematological: Negative  Does not bruise/bleed easily  Psychiatric/Behavioral: Positive for sleep disturbance (hard time falling asleep with severe headaches)  Negative for confusion and hallucinations  All other systems reviewed and are negative

## 2023-01-06 NOTE — PATIENT INSTRUCTIONS
Referrals:  Physical therapy    Headache Calendar  Please maintain a headache calendar  Consider using phone applications such as Migraine [place] or [place] Migraine Tracker    Headache/migraine treatment:   Acute medications (for immediate treatment of a headache): It is ok to take ibuprofen, acetaminophen or naproxen (Advil, Tylenol,  Aleve, Excedrin) if they help your headaches you should limit these to No more than 2-3 times a week to avoid medication overuse/rebound headaches  For your more moderate to severe migraines take this medication early  Maxalt (rizatriptan) 10mg tabs - take one at the onset of headache  May repeat one time after 2 hours if pain has not resolved  (Max 2 a day and 10 a month)     Prescription preventive medications for headaches/migraines   (to take every day to help prevent headaches - not to take at the time of headache):  [x] Venlafaxine  Take 1 tablet (37 5mg) at bedtime nightly  - Okay to combine with food or snack for nausea  - Okay to adjust timing to slightly earlier in the evening if feeling drowsy in the morning    *Typically these types of medications take time until you see the benefit, although some may see improvement in days, often it may take weeks, especially if the medication is being titrated up to a beneficial level  Please contact us if there are any concerns or questions regarding the medication  Lifestyle Recommendations:  [x] SLEEP - Maintain a regular sleep schedule: Adults need at least 7-8 hours of uninterrupted a night  Maintain good sleep hygiene:  Going to bed and waking up at consistent times, avoiding excessive daytime naps, avoiding caffeinated beverages in the evening, avoid excessive stimulation in the evening and generally using bed primarily for sleeping  One hour before bedtime would recommend turning lights down lower, decreasing your activity (may read quietly, listen to music at a low volume)   When you get into bed, should eliminate all technology (no texting, emailing, playing with your phone, iPad or tablet in bed)  [x] HYDRATION - Maintain good hydration  Drink  2L of fluid a day (4 typical small water bottles)  [x] DIET - Maintain good nutrition  In particular don't skip meals and try and eat healthy balanced meals regularly  [x] TRIGGERS - Look for other triggers and avoid them: Limit caffeine to 1-2 cups a day or less  Avoid dietary triggers that you have noticed bring on your headaches (this could include aged cheese, peanuts, MSG, aspartame and nitrates)  [x] EXERCISE - physical exercise as we all know is good for you in many ways, and not only is good for your heart, but also is beneficial for your mental health, cognitive health and  chronic pain/headaches  I would encourage at the least 5 days of physical exercise weekly for at least 30 minutes  Education and Follow-up  [x] Please call with any questions or concerns  Of course if any new concerning symptoms go to the emergency department    [x] Follow up in 4 months

## 2023-01-08 ENCOUNTER — TELEPHONE (OUTPATIENT)
Dept: NEUROLOGY | Facility: CLINIC | Age: 23
End: 2023-01-08

## 2023-01-08 ENCOUNTER — NURSE TRIAGE (OUTPATIENT)
Dept: OTHER | Facility: OTHER | Age: 23
End: 2023-01-08

## 2023-01-09 ENCOUNTER — TELEPHONE (OUTPATIENT)
Dept: NEUROLOGY | Facility: CLINIC | Age: 23
End: 2023-01-09

## 2023-01-09 NOTE — TELEPHONE ENCOUNTER
Regarding: Shaky/ Uneasy after taking medication  ----- Message from Jenifer Mcfadden sent at 1/8/2023  8:32 PM EST -----  "Patients mom called back in"

## 2023-01-09 NOTE — TELEPHONE ENCOUNTER
Regarding: Shaky/ Uneasy after taking medication  ----- Message from Raeann Castro sent at 1/8/2023  8:22 PM EST -----  "My daughter took two EFFEXOR in the evening, as prescribed by Dr Swapnil Jernigan  She is not feeling well, she's crying, shaky and uneasy  "

## 2023-01-09 NOTE — TELEPHONE ENCOUNTER
Answer Assessment - Initial Assessment Questions  1  NAME of MEDICATION: "What medicine are you calling about?"      Effexor  2  QUESTION: "What is your question?" (e g , medication refill, side effect)      Concerned about possible side effects of Effexor  3  PRESCRIBING HCP: "Who prescribed it?" Reason: if prescribed by specialist, call should be referred to that group  Dr Malik Gerardo  4   SYMPTOMS: "Do you have any symptoms?"      Crying, shakiness, nausea    Protocols used: MEDICATION QUESTION CALL-ADULT-

## 2023-01-09 NOTE — TELEPHONE ENCOUNTER
was started on Effexor 37mg Friday  After that dose she felt nausea, which later resolved  Second dose Saturday resulted in similar symptoms  Today patient notes she is crying over everything, also started to have shakiness today  Denies any CP, but does feel like her heart is racing  She did not take her Effexor dose today  Also reports her fingers feel like they have pins and needles in them  Patient is 3 hours away at [facility]       On call provider notified

## 2023-01-09 NOTE — TELEPHONE ENCOUNTER
Per on call-  as she is also taking Maxalt, patient should consider ER visit with concern for developing serotonin syndrome vs just side effects of Effexor  Mom was notified of the above and noted Flakito Flannery never started the 2004 [address]  Will however take her to ER if symptoms persist      Will forward to Dr Marrian Severe at [facility] request for further recommendations regarding discontinuation of Effexor

## 2023-01-09 NOTE — TELEPHONE ENCOUNTER
Physician on-call was reached with concerns for Effexor SE- patient was advised to skip 3rd dose and take lots of fluid now and proceed with ER evaluation for tachycardia and potentially elevated BP  I brought concern for serotonin syndrome with Maxalt use, which is a less likely possibility considering medication was just started  Patient also sent [employer] message for suggestions      SAMIR

## 2023-01-09 NOTE — TELEPHONE ENCOUNTER
Call complete to pt on behalf of Dr Malik Gerardo   Scheduled patient for a virtual follow up on 01/11 at 4 PM   ADD ON

## 2023-01-11 ENCOUNTER — TELEMEDICINE (OUTPATIENT)
Dept: NEUROLOGY | Facility: CLINIC | Age: 23
End: 2023-01-11

## 2023-01-11 VITALS — BODY MASS INDEX: 18.52 KG/M2 | HEIGHT: 67 IN | WEIGHT: 118 LBS

## 2023-01-11 DIAGNOSIS — F41.9 ANXIETY DISORDER, UNSPECIFIED TYPE: ICD-10-CM

## 2023-01-11 DIAGNOSIS — M54.2 CERVICALGIA: ICD-10-CM

## 2023-01-11 DIAGNOSIS — G47.9 SLEEP DIFFICULTIES: ICD-10-CM

## 2023-01-11 DIAGNOSIS — G43.109 MIGRAINE WITH AURA AND WITHOUT STATUS MIGRAINOSUS, NOT INTRACTABLE: Primary | ICD-10-CM

## 2023-01-11 NOTE — PATIENT INSTRUCTIONS
Referral  - Psychology    Headache Calendar  Please maintain a headache calendar  Consider using phone applications such as Migraine [place] or [place] Migraine Tracker     Headache/migraine treatment:   Acute medications (for immediate treatment of a headache): It is ok to take ibuprofen, acetaminophen or naproxen (Advil, Tylenol,  Aleve, Excedrin) if they help your headaches you should limit these to No more than 2-3 times a week to avoid medication overuse/rebound headaches  For your more moderate to severe migraines take this medication early  Maxalt (rizatriptan) 10mg tabs - take one at the onset of headache  May repeat one time after 2 hours if pain has not resolved  (Max 2 a day and 10 a month)      Over the counter preventive supplements for headaches/migraines (if you try, try for 3 months straight)  (to take every day to help prevent headaches - not to take at the time of headache):  [x] Magnesium 400mg daily (If any diarrhea or upset stomach, decrease dose  as tolerated)- oxide or glycinate  [x] Riboflavin (Vitamin B2) 400mg daily - (FYI B2 may make your urine bright/neon yellow)     Lifestyle Recommendations:  [x] SLEEP - Maintain a regular sleep schedule: Adults need at least 7-8 hours of uninterrupted a night  Maintain good sleep hygiene:  Going to bed and waking up at consistent times, avoiding excessive daytime naps, avoiding caffeinated beverages in the evening, avoid excessive stimulation in the evening and generally using bed primarily for sleeping  One hour before bedtime would recommend turning lights down lower, decreasing your activity (may read quietly, listen to music at a low volume)  When you get into bed, should eliminate all technology (no texting, emailing, playing with your phone, iPad or tablet in bed)  [x] HYDRATION - Maintain good hydration  Drink  2L of fluid a day (4 typical small water bottles)  [x] DIET - Maintain good nutrition   In particular don't skip meals and try and eat healthy balanced meals regularly  [x] TRIGGERS - Look for other triggers and avoid them: Limit caffeine to 1-2 cups a day or less  Avoid dietary triggers that you have noticed bring on your headaches (this could include aged cheese, peanuts, MSG, aspartame and nitrates)  [x] EXERCISE - physical exercise as we all know is good for you in many ways, and not only is good for your heart, but also is beneficial for your mental health, cognitive health and  chronic pain/headaches  I would encourage at the least 5 days of physical exercise weekly for at least 30 minutes  Education and Follow-up  [x] Please call with any questions or concerns  Of course if any new concerning symptoms go to the emergency department    [x] Follow up in 4 months as scheduled

## 2023-01-11 NOTE — PROGRESS NOTES
[address] [facility] Consult - Follow up   PATIENT:  Lilly Castellon  MRN:  [id number]  :  2000  DATE OF SERVICE:  2023  REFERRED BY: No ref  provider found  PMD: SHERI Rizo    Telemedicine consent    Patient: Lilly Castellon  Provider: Krystian Gregorio DO  Provider located at [address] [place] [id number]    The patient was identified by name and date of birth  Lilly Castellon was informed that this is a telemedicine visit and that the visit is being conducted through the [facility] [employer]  She agrees to proceed     My office door was closed  No one else was in the room  She acknowledged consent and understanding of privacy and security of the video platform  The patient has agreed to participate and understands they can discontinue the visit at any time  Patient is aware this is a billable service  Assessment/Plan:   Lilly Castellon is a delightful 23 y o  female with a past medical history that includes GERD, POWER here for f/u evaluation of headache  Since her last visit, she reports that she was unable to tolerate venlafaxine due to side effects  She needed to go to the ER and in the interim where she received Ativan, Toradol, and IV fluids for combination of both headache and anxiety  We discussed considering another medication such as nortriptyline, but she was interested in trying magnesium and B2 supplements first   She has not yet tried rizatriptan, but will use it for any severe, debilitating migraines  She was interested in a psychology referral to help with her anxiety and will also reach out to her wellness center on campus  Workup:  - Neurologic assessment reveals unremarkable neurological exam   - With no new or concerning symptoms, no red flags and an unremarkable neurologic exam, there is no specific indication for further evaluation with MRI brain    However, this could be obtained at any time if indicated       Preventative:  - we discussed headache hygiene and lifestyle factors that may improve headaches  - Mg and B2 supplements  - Currently on through other providers: None  - Past/ failed/contraindicated: Mg (did not like how it made her feel, but willing to try again), avoid anti-hypertensive's due to baseline low vitals, failed Venlafaxine (side effects)  - future options: TCA, CGRP med, botox     Acute:  - discussed not taking over-the-counter or prescription pain medications more than 3 days per week to prevent medication overuse/rebound headache  - Maxalt 10mg ODT  - Currently on through other providers: None  - Past/ failed/contraindicated: None  - future options:  Triptan, prochlorperazine, Toradol IM or p o , could consider trial of 5 days of Depakote 500 mg nightly or dexamethasone 2 mg daily for prolonged migraine, annette Doty  Patient instructions   Referral  - Psychology    Headache Calendar  Please maintain a headache calendar  Consider using phone applications such as [facility] [place] or [place] [facility]raine Tracker     Headache/migraine treatment:   Acute medications (for immediate treatment of a headache): It is ok to take ibuprofen, acetaminophen or naproxen (Advil, Tylenol,  Aleve, Excedrin) if they help your headaches you should limit these to No more than 2-3 times a week to avoid medication overuse/rebound headaches       For your more moderate to severe migraines take this medication early  Maxalt (rizatriptan) 10mg tabs - take one at the onset of headache  May repeat one time after 2 hours if pain has not resolved     (Max 2 a day and 10 a month)      Over the counter preventive supplements for headaches/migraines (if you try, try for 3 months straight)  (to take every day to help prevent headaches - not to take at the time of headache):  [x] Magnesium 400mg daily (If any diarrhea or upset stomach, decrease dose  as tolerated)- oxide or glycinate  [x] Riboflavin (Vitamin B2) 400mg daily - (FYI B2 may make your urine bright/neon yellow)     Lifestyle Recommendations:  [x]? SLEEP - Maintain a regular sleep schedule: Adults need at least 7-8 hours of uninterrupted a night  Maintain good sleep hygiene:  Going to bed and waking up at consistent times, avoiding excessive daytime naps, avoiding caffeinated beverages in the evening, avoid excessive stimulation in the evening and generally using bed primarily for sleeping  One hour before bedtime would recommend turning lights down lower, decreasing your activity (may read quietly, listen to music at a low volume)  When you get into bed, should eliminate all technology (no texting, emailing, playing with your phone, iPad or tablet in bed)  [x]? HYDRATION - Maintain good hydration  Drink  2L of fluid a day (4 typical small water bottles)  [x]? DIET - Maintain good nutrition  In particular don't skip meals and try and eat healthy balanced meals regularly  [x]? TRIGGERS - Look for other triggers and avoid them: Limit caffeine to 1-2 cups a day or less  Avoid dietary triggers that you have noticed bring on your headaches (this could include aged cheese, peanuts, MSG, aspartame and nitrates)  [x]? EXERCISE - physical exercise as we all know is good for you in many ways, and not only is good for your heart, but also is beneficial for your mental health, cognitive health and  chronic pain/headaches  I would encourage at the least 5 days of physical exercise weekly for at least 30 minutes       Education and Follow-up  [x]? Please call with any questions or concerns  Of course if any new concerning symptoms go to the emergency department  [x]? Follow up in 4 months as scheduled  Subjective:   1/11/23: Since her last visit, she tried taking Effexor but did not tolerate it  Needed to go to the ER for evaluation and was given IV fluids, Ativan and Toradol for her headache   Reports that she felt very emotional while taking the Venlafaxine for just a couple days  Previous History:  1/6/23: Ms  Luisana Madera reports a history of headaches that started around July 2022  The description of her headaches sound migrainous and she has a strong family history suggestive of migraines as well  I believe there are multiple contributing factors to her headaches including the genetic component, stress, anxiety and sleep difficulties  She is currently starting biomedical engineering at [employer]  We discussed potential treatment options, but thought that venlafaxine would be a good choice given her difficulties with sleep and mood  From an abortive standpoint, I have encouraged her to decrease her use of Tylenol throughout the week and I will prescribe Maxalt for abortive management  At this time based on a normal neurological exam and no significant red flags, I do not think brain imaging is warranted  If we have difficulty treating her headaches going forward, I will obtain an MRI of the brain for further evaluation  Past Medical History:   No past medical history on file  Patient Active Problem List   Diagnosis   • POWER (generalized anxiety disorder)   • GERD (gastroesophageal reflux disease)       Medications:      Current Outpatient Medications   Medication Sig Dispense Refill   • acetaminophen (TYLENOL) 325 mg tablet Take 650 mg by mouth every 6 (six) hours as needed for mild pain     • Cetirizine HCl (ZYRTEC ALLERGY PO) Take by mouth     • ibuprofen (MOTRIN) 400 mg tablet Take by mouth every 6 (six) hours as needed for mild pain     • rizatriptan (MAXALT-MLT) 10 mg disintegrating tablet Take 1 tablet (10 mg total) by mouth once as needed for migraine for up to 1 dose May repeat in 2 hours if needed  Max 2 tablets in 24 hours 10 tablet 3   • venlafaxine (EFFEXOR-XR) 37 5 mg 24 hr capsule Take 1 capsule (37 5 mg total) by mouth daily at bedtime 30 capsule 4     No current facility-administered medications for this visit  Allergies:    No Known Allergies    Family History:     Family History   Problem Relation Age of Onset   • Thyroid disease Mother    • No Known Problems Father    • Hypertension Maternal Grandmother    • Hypertension Maternal Grandfather        Social History:     Social History     Socioeconomic History   • Marital status: Single     Spouse name: Not on file   • Number of children: Not on file   • Years of education: Not on file   • Highest education level: Not on file   Occupational History   • Not on file   Tobacco Use   • Smoking status: Never   • Smokeless tobacco: Never   Vaping Use   • Vaping Use: Never used   Substance and Sexual Activity   • Alcohol use: Never   • Drug use: Never   • Sexual activity: Never   Other Topics Concern   • Not on file   Social History Narrative   • Not on file     Social Determinants of Health     Financial Resource Strain: Not on file   Food Insecurity: Not on file   Transportation Needs: Not on file   Physical Activity: Not on file   Stress: Not on file   Social Connections: Not on file   Intimate Partner Violence: Not on file   Housing Stability: Not on file     Objective:   Limited exam due to tele-medicine  Physical Exam:                                                               Vitals:            Constitutional:    There were no vitals taken for this visit  BP Readings from Last 3 Encounters:   01/06/23 96/58   01/05/23 108/62   12/27/21 118/62     Pulse Readings from Last 3 Encounters:   01/06/23 78   01/05/23 80         Well developed, well nourished, No dysmorphic features  HEENT:  Normocephalic atraumatic  See neuro exam   Psychiatric:  Normal behavior and appropriate affect        Neurological Examination:     Mental status/cognitive function:   Recent and remote memory appear intact  Attention span and concentration as well as fund of knowledge appear appropriate for age  Normal language and spontaneous speech    Cranial Nerves:  III, IV, VI-Pupils were equal, round  Extraocular movements appear full and conjugate   VII-facial expression symmetric  Motor Exam: symmetric bulk throughout  no atrophy, fasciculations or abnormal movements noted  Coordination:  no apparent dysmetria, ataxia or tremor noted  Review of Systems:   Constitutional: Negative  Negative for appetite change and fever  HENT: Negative  Negative for hearing loss, tinnitus, trouble swallowing and voice change  Eyes: Negative  Negative for photophobia, pain and visual disturbance  Respiratory: Negative  Negative for shortness of breath  Cardiovascular: Negative  Negative for palpitations  Gastrointestinal: Negative  Negative for nausea and vomiting  Endocrine: Negative  Negative for cold intolerance  Genitourinary: Negative  Negative for dysuria, frequency and urgency  Musculoskeletal: Negative  Negative for gait problem, myalgias and neck pain  Skin: Negative  Negative for rash  Allergic/Immunologic: Negative  Neurological: Negative for dizziness, tremors, seizures, syncope, facial asymmetry, speech difficulty, weakness, light-headedness, numbness and headaches  Hematological: Negative  Does not bruise/bleed easily  Psychiatric/Behavioral: Negative for confusion, hallucinations and sleep disturbance  I have spent 15 minutes with Patient  today in which greater than 50% of this time was spent in counseling/coordination of care regarding Prognosis, Risks and benefits of tx options, Patient and family education and Impressions  I also spent 10 minutes non face to face for this patient the same day       Activity Minutes   Precharting/reviewing 5   Patient care/counseling 15   Postcharting/care coordination 5       Author:  Nuon Avitia DO 1/11/2023 6:48 AM

## 2023-01-11 NOTE — PROGRESS NOTES
Review of Systems   Constitutional: Negative  Negative for appetite change and fever  HENT: Negative  Negative for hearing loss, tinnitus, trouble swallowing and voice change  Eyes: Negative  Negative for photophobia, pain and visual disturbance  Respiratory: Negative  Negative for shortness of breath  Cardiovascular: Negative  Negative for palpitations  Gastrointestinal: Negative  Negative for nausea and vomiting  Endocrine: Negative  Negative for cold intolerance  Genitourinary: Negative  Negative for dysuria, frequency and urgency  Musculoskeletal: Negative  Negative for gait problem, myalgias and neck pain  Skin: Negative  Negative for rash  Allergic/Immunologic: Negative  Neurological: Negative for dizziness, tremors, seizures, syncope, facial asymmetry, speech difficulty, weakness, light-headedness, numbness and headaches  Hematological: Negative  Does not bruise/bleed easily  Psychiatric/Behavioral: Negative for confusion, hallucinations and sleep disturbance

## 2023-04-28 ENCOUNTER — TELEPHONE (OUTPATIENT)
Dept: NEUROLOGY | Facility: CLINIC | Age: 23
End: 2023-04-28

## 2023-04-30 ENCOUNTER — NURSE TRIAGE (OUTPATIENT)
Dept: OTHER | Facility: OTHER | Age: 23
End: 2023-04-30

## 2023-04-30 NOTE — TELEPHONE ENCOUNTER
"Please call Monday for virtual appointment  Reason for Disposition   Common cold with no complications    Answer Assessment - Initial Assessment Questions  1  ONSET: \"When did the nasal discharge start? \"       Friday    2  AMOUNT: \"How much there? \"       Congestion    3  COUGH: \"Do you have a cough? \" If yes, ask: \"Describe the color of your sputum\" (clear, white, yellow, green)      Dry cough     4  RESPIRATORY DISTRESS: \"Describe your breathing  \"       Denies    5  FEVER: \"Do you have a fever? \" If Yes, ask: \"What is your temperature, how was it measured, and when did it start? \"          6  SEVERITY: \"Overall, how bad are you feeling right now? \" (e g , doesn't interfere with normal activities, staying home from school/work, staying in bed)         7  OTHER SYMPTOMS: \"Do you have any other symptoms? \" (e g , sore throat, earache, wheezing, vomiting)    Ear congestion    8  PREGNANCY: \"Is there any chance you are pregnant? \" \"When was your last menstrual period? \"      Denies    Perficient services  Mucinex and sudafed past three days    Protocols used: COMMON COLD-ADULT-AH    "

## 2023-04-30 NOTE — TELEPHONE ENCOUNTER
"Regarding: congestion/cough  ----- Message from [facility] sent at 4/30/2023 10:18 AM EDT -----  \"I have congestion, cough with mucus and pressure in both ears  \"    "

## 2023-05-02 NOTE — TELEPHONE ENCOUNTER
Pt scheduled for 5/5/23 with Giovanni Barney, will take a covid test prior to appt, symptoms started last week

## 2023-05-05 ENCOUNTER — OFFICE VISIT (OUTPATIENT)
Dept: URGENT CARE | Facility: CLINIC | Age: 23
End: 2023-05-05

## 2023-05-05 VITALS
WEIGHT: 114 LBS | OXYGEN SATURATION: 98 % | HEART RATE: 94 BPM | HEIGHT: 68 IN | TEMPERATURE: 98.9 F | BODY MASS INDEX: 17.28 KG/M2 | RESPIRATION RATE: 16 BRPM | SYSTOLIC BLOOD PRESSURE: 92 MMHG | DIASTOLIC BLOOD PRESSURE: 58 MMHG

## 2023-05-05 DIAGNOSIS — J01.10 ACUTE NON-RECURRENT FRONTAL SINUSITIS: Primary | ICD-10-CM

## 2023-05-05 RX ORDER — METHYLPREDNISOLONE 4 MG/1
TABLET ORAL
Qty: 21 TABLET | Refills: 0 | Status: SHIPPED | OUTPATIENT
Start: 2023-05-05

## 2023-05-05 RX ORDER — AZITHROMYCIN 250 MG/1
TABLET, FILM COATED ORAL
Qty: 6 TABLET | Refills: 0 | Status: SHIPPED | OUTPATIENT
Start: 2023-05-05 | End: 2023-05-09

## 2023-05-09 ENCOUNTER — HOSPITAL ENCOUNTER (OUTPATIENT)
Dept: RADIOLOGY | Facility: HOSPITAL | Age: 23
Discharge: HOME | End: 2023-05-09

## 2023-05-09 ENCOUNTER — OFFICE VISIT (OUTPATIENT)
Dept: FAMILY MEDICINE CLINIC | Facility: HOSPITAL | Age: 23
End: 2023-05-09

## 2023-05-09 VITALS
WEIGHT: 115.4 LBS | HEART RATE: 69 BPM | TEMPERATURE: 97.3 F | BODY MASS INDEX: 17.49 KG/M2 | HEIGHT: 68 IN | SYSTOLIC BLOOD PRESSURE: 102 MMHG | OXYGEN SATURATION: 98 % | DIASTOLIC BLOOD PRESSURE: 60 MMHG

## 2023-05-09 DIAGNOSIS — J40 TRACHEOBRONCHITIS: Primary | ICD-10-CM

## 2023-05-09 DIAGNOSIS — K59.00 CONSTIPATION, UNSPECIFIED CONSTIPATION TYPE: ICD-10-CM

## 2023-05-09 DIAGNOSIS — J40 TRACHEOBRONCHITIS: ICD-10-CM

## 2023-05-09 DIAGNOSIS — Z83.79 FAMILY HISTORY OF CROHN'S DISEASE: ICD-10-CM

## 2023-05-09 DIAGNOSIS — J01.00 ACUTE NON-RECURRENT MAXILLARY SINUSITIS: ICD-10-CM

## 2023-05-09 RX ORDER — ALBUTEROL SULFATE 90 UG/1
2 AEROSOL, METERED RESPIRATORY (INHALATION) EVERY 6 HOURS PRN
Qty: 18 G | Refills: 1 | Status: SHIPPED | OUTPATIENT
Start: 2023-05-09 | End: 2023-06-08

## 2023-05-09 NOTE — PROGRESS NOTES
Assessment/Plan:     Diagnosis ICD-10-CM Associated Orders   1  Tracheobronchitis  J40 XR chest pa & lateral     albuterol (PROVENTIL HFA,VENTOLIN HFA) 90 mcg/act inhaler      2  Acute non-recurrent maxillary sinusitis  J01 00       3  Family history of Crohn's disease  Z83 79 Ambulatory referral to Gastroenterology      4  Constipation, unspecified constipation type  K59 00 Ambulatory referral to Gastroenterology          Problem List Items Addressed This Visit    None  Visit Diagnoses     Tracheobronchitis    -  Primary    Relevant Medications    albuterol (PROVENTIL HFA,VENTOLIN HFA) 90 mcg/act inhaler    Other Relevant Orders    XR chest pa & lateral (Completed)    Acute non-recurrent maxillary sinusitis        Family history of Crohn's disease        Relevant Orders    Ambulatory referral to Gastroenterology    Constipation, unspecified constipation type        Relevant Orders    Ambulatory referral to Gastroenterology            No follow-ups on file  Subjective:    Patient ID: Jung Nunez is a 26 y o  female    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 [employer]  1  Geoffrey Tenorio - had cough and  congestion starting 2 weeks ago- was at urgent care at school - took pseudoephedrine etc   then seen at urgent care on  Return here- started Friday with zpack and  Steroid taper- worsening coughj still  2  Constipation- ongoingissue- uncle and cousin have crohns disease      The following portions of the patient's history were reviewed and updated as appropriate: allergies, current medications and problem list      Review of Systems   Constitutional: Positive for chills  Negative for fever  Tmax 100 about 2 week ago- tested  Negative covdi  With onset   HENT: Positive for congestion  Respiratory: Positive for chest tightness  After coughing episodes- pain in upper back from coughing   Cardiovascular: Negative for chest pain  Gastrointestinal: Positive for constipation  "Negative for abdominal pain  Objective:      Current Outpatient Medications:   •  acetaminophen (TYLENOL) 325 mg tablet, Take 650 mg by mouth every 6 (six) hours as needed for mild pain, Disp: , Rfl:   •  albuterol (PROVENTIL HFA,VENTOLIN HFA) 90 mcg/act inhaler, Inhale 2 puffs every 6 (six) hours as needed for wheezing, Disp: 18 g, Rfl: 1  •  Cetirizine HCl (ZYRTEC ALLERGY PO), Take by mouth if needed, Disp: , Rfl:   •  ibuprofen (MOTRIN) 400 mg tablet, Take by mouth every 6 (six) hours as needed for mild pain, Disp: , Rfl:   •  cefuroxime (CEFTIN) 250 mg tablet, Take 1 tablet (250 mg total) by mouth every 12 (twelve) hours for 7 days, Disp: 14 tablet, Rfl: 0  •  predniSONE 10 mg tablet, 30 mg by mouth daily for 3 days, then 20 mg by mouth daily for 3 days, then 10 mg by mouth daily for 3 days, then stop, Disp: 18 tablet, Rfl: 0  •  rizatriptan (MAXALT-MLT) 10 mg disintegrating tablet, Take 1 tablet (10 mg total) by mouth once as needed for migraine for up to 1 dose May repeat in 2 hours if needed  Max 2 tablets in 24 hours (Patient not taking: Reported on 5/9/2023), Disp: 10 tablet, Rfl: 3    Blood pressure 102/60, pulse 69, temperature (!) 97 3 °F (36 3 °C), height 5' 8\" (1 727 m), weight 52 3 kg (115 lb 6 4 oz), SpO2 98 %  Physical Exam  Vitals and nursing note reviewed  Constitutional:       General: She is not in acute distress  Appearance: She is not toxic-appearing  HENT:      Head: Normocephalic  Right Ear: Tympanic membrane normal  There is no impacted cerumen  Left Ear: Tympanic membrane normal  There is no impacted cerumen  Nose: Congestion present  Mouth/Throat:      Pharynx: No oropharyngeal exudate or posterior oropharyngeal erythema  Eyes:      General:         Right eye: No discharge  Left eye: Discharge present  Pupils: Pupils are equal, round, and reactive to light  Cardiovascular:      Rate and Rhythm: Normal rate and regular rhythm   " Pulmonary:      Breath sounds: Wheezing present  Comments: Left lower  Lobe end exp wheezes  Abdominal:      General: There is no distension  Palpations: Abdomen is soft  Musculoskeletal:         General: No swelling or tenderness  Lymphadenopathy:      Cervical: Cervical adenopathy present  Neurological:      General: No focal deficit present  Mental Status: She is alert

## 2023-05-11 ENCOUNTER — TELEPHONE (OUTPATIENT)
Dept: FAMILY MEDICINE CLINIC | Facility: HOSPITAL | Age: 23
End: 2023-05-11

## 2023-05-11 DIAGNOSIS — J40 TRACHEOBRONCHITIS: Primary | ICD-10-CM

## 2023-05-11 DIAGNOSIS — J01.00 ACUTE NON-RECURRENT MAXILLARY SINUSITIS: ICD-10-CM

## 2023-05-11 RX ORDER — PREDNISONE 10 MG/1
TABLET ORAL
Qty: 18 TABLET | Refills: 0 | Status: SHIPPED | OUTPATIENT
Start: 2023-05-11 | End: 2023-07-13 | Stop reason: ALTCHOICE

## 2023-05-11 RX ORDER — CEFUROXIME AXETIL 250 MG/1
250 TABLET ORAL EVERY 12 HOURS SCHEDULED
Qty: 14 TABLET | Refills: 0 | Status: SHIPPED | OUTPATIENT
Start: 2023-05-11 | End: 2023-05-18

## 2023-05-12 ENCOUNTER — HOSPITAL ENCOUNTER (OUTPATIENT)
Dept: RADIOLOGY | Facility: HOSPITAL | Age: 23
Discharge: HOME | End: 2023-05-12

## 2023-05-12 ENCOUNTER — OFFICE VISIT (OUTPATIENT)
Dept: NEUROLOGY | Facility: CLINIC | Age: 23
End: 2023-05-12

## 2023-05-12 ENCOUNTER — TELEPHONE (OUTPATIENT)
Dept: OTHER | Facility: OTHER | Age: 23
End: 2023-05-12

## 2023-05-12 VITALS
OXYGEN SATURATION: 97 % | HEART RATE: 75 BPM | TEMPERATURE: 98.5 F | HEIGHT: 68 IN | WEIGHT: 115.3 LBS | DIASTOLIC BLOOD PRESSURE: 64 MMHG | SYSTOLIC BLOOD PRESSURE: 103 MMHG | BODY MASS INDEX: 17.47 KG/M2

## 2023-05-12 DIAGNOSIS — F41.9 ANXIETY DISORDER, UNSPECIFIED TYPE: ICD-10-CM

## 2023-05-12 DIAGNOSIS — G43.109 MIGRAINE WITH AURA AND WITHOUT STATUS MIGRAINOSUS, NOT INTRACTABLE: Primary | ICD-10-CM

## 2023-05-12 DIAGNOSIS — R05.3 PERSISTENT COUGH: Primary | ICD-10-CM

## 2023-05-12 DIAGNOSIS — G47.9 SLEEP DIFFICULTIES: ICD-10-CM

## 2023-05-12 DIAGNOSIS — R05.3 PERSISTENT COUGH: ICD-10-CM

## 2023-05-12 DIAGNOSIS — M54.2 CERVICALGIA: ICD-10-CM

## 2023-05-12 NOTE — TELEPHONE ENCOUNTER
Pt states that now that she is on the 2nd round of abx and steroid she is developing pain on the right side rib area  She would like an x-ray to make sure she is does not have pneumonia

## 2023-05-12 NOTE — TELEPHONE ENCOUNTER
Pt called again to make sure her original message was sent this morning  She stated she  Is now having discomfort in the L side of her back  She would like to keep with Dr Frantz Bledsoe for this issue since she saw her last week for URI  She asked for an x ray order to rule out pneumonia

## 2023-05-12 NOTE — PROGRESS NOTES
Review of Systems   Constitutional: Negative  Negative for appetite change and fever  HENT: Negative  Negative for hearing loss, tinnitus, trouble swallowing and voice change  Eyes: Positive for visual disturbance  Negative for photophobia and pain  Respiratory: Negative  Negative for shortness of breath  Cardiovascular: Negative  Negative for palpitations  Gastrointestinal: Negative  Negative for nausea and vomiting  Endocrine: Negative  Negative for cold intolerance  Genitourinary: Negative  Negative for dysuria, frequency and urgency  Musculoskeletal: Negative  Negative for gait problem, myalgias and neck pain  Skin: Negative  Negative for rash  Allergic/Immunologic: Negative  Neurological: Positive for headaches  Negative for dizziness, tremors, seizures, syncope, facial asymmetry, speech difficulty, weakness, light-headedness and numbness  Hematological: Negative  Does not bruise/bleed easily  Psychiatric/Behavioral: Negative  Negative for confusion, hallucinations and sleep disturbance  All other systems reviewed and are negative  Since your last visit are your headaches improved    Any change to the headache type? no    What is your current headache frequency: 1-2 per week     Are you taking your current medications as prescribed? Taking magnesium & b2 but not medications    If no, why not? Afraid of side-effects    Do you have any side effects? N/A    How may days per week do you take an abortive medicine?  1-2

## 2023-05-12 NOTE — PROGRESS NOTES
[facility] Consult - Follow up   PATIENT:  Karol Galo  MRN:  [id number]  :  2000  DATE OF SERVICE:  2023  REFERRED BY: No ref  provider found  PMD: SHERI Kaba    Assessment/Plan:   Elizabeth Santos a delightful 22 y o  female with a past medical history that includes GERD, POWER here for f/u evaluation of headache  Since her last visit, she has been doing very well with magnesium and B2 supplements  She is currently taking 400 mg of magnesium and 200 mg of B2 daily  Current headache frequency is approximately 4-8/30  With regards to abortive management, she is a little hesitant to try rizatriptan, but I encouraged her to at least try it and see if it is beneficial for the more debilitating headaches  She has had a couple episodes of migraine with visual aura since our last visit and was interested in getting an MRI to ensure that there is no secondary process contributing to this  I have asked her to try and increase her riboflavin to 400 mg daily to see if that provides any further benefit going forward      Workup:  - Neurologic assessment reveals unremarkable neurological exam   - MRI brain with and without contrast     Preventative:  - we discussed headache hygiene and lifestyle factors that may improve headaches  - Mg and B2 supplements  - Currently on through other providers: None  - Past/ failed/contraindicated: Mg (did not like how it made her feel, but willing to try again), avoid anti-hypertensive's due to baseline low vitals, failed Venlafaxine (side effects)  - future options: TCA, CGRP med, botox     Acute:  - discussed not taking over-the-counter or prescription pain medications more than 3 days per week to prevent medication overuse/rebound headache  - Maxalt 10mg ODT  - Currently on through other providers: None  - Past/ failed/contraindicated: None  - future options:  Triptan, prochlorperazine, Toradol IM or p o , could consider trial of 5 days of Depakote 500 mg nightly or dexamethasone 2 mg daily for prolonged migraine, anila Stern nurtec  Patient instructions   Additional Testing  MRI brain    Headache Calendar  Please maintain a headache calendar  Consider using phone applications such as Migraine [place] or [place] Migraine Tracker     Headache/migraine treatment:   Acute medications (for immediate treatment of a headache): It is ok to take ibuprofen, acetaminophen or naproxen (Advil, Tylenol,  Aleve, Excedrin) if they help your headaches you should limit these to No more than 2-3 times a week to avoid medication overuse/rebound headaches       For your more moderate to severe migraines take this medication early  Maxalt (rizatriptan) 10mg tabs - take one at the onset of headache  May repeat one time after 2 hours if pain has not resolved  (Max 2 a day and 10 a month)      Over the counter preventive supplements for headaches/migraines (if you try, try for 3 months straight)  (to take every day to help prevent headaches - not to take at the time of headache):  [x]? Magnesium 400mg daily (If any diarrhea or upset stomach, decrease dose  as tolerated)- oxide or glycinate  [x]? Riboflavin (Vitamin B2) 400mg daily - (FYI B2 may make your urine bright/neon yellow)     Lifestyle Recommendations:  [x]? ? SLEEP - Maintain a regular sleep schedule: Adults need at least 7-8 hours of uninterrupted a night  Maintain good sleep hygiene:  Going to bed and waking up at consistent times, avoiding excessive daytime naps, avoiding caffeinated beverages in the evening, avoid excessive stimulation in the evening and generally using bed primarily for sleeping   One hour before bedtime would recommend turning lights down lower, decreasing your activity (may read quietly, listen to music at a low volume)   When you get into bed, should eliminate all technology (no texting, emailing, playing with your phone, iPad or tablet in bed)   [x]?? HYDRATION - Maintain good hydration   Drink  2L of fluid a day (4 typical small water bottles)  [x]? ? DIET - Maintain good nutrition  In particular don't skip meals and try and eat healthy balanced meals regularly  [x]? ? TRIGGERS - Look for other triggers and avoid them: Limit caffeine to 1-2 cups a day or less  Avoid dietary triggers that you have noticed bring on your headaches (this could include aged cheese, peanuts, MSG, aspartame and nitrates)  [x]? ? EXERCISE - physical exercise as we all know is good for you in many ways, and not only is good for your heart, but also is beneficial for your mental health, cognitive health and  chronic pain/headaches  I would encourage at the least 5 days of physical exercise weekly for at least 30 minutes       Education and Follow-up  [x]? ? Please call with any questions or concerns  Of course if any new concerning symptoms go to the emergency department  [x]? ? Follow up in 6 months  Subjective:   5/12/23: Since her last visit, she feels that her headaches have improved  At most, currently experiencing about 1-2 headache days per week  She has not taken rizatriptan as she is afraid of the potential of side effects  Currently taking 400mg Mg and 200mg B2  She had a couple episodes since her last visit where she had a visual aura prior to the onset of her migraine  Both happened while she was in class and she did not have abortive medication with her  Previous History:  1/11/23: Since her last visit, she tried taking Effexor but did not tolerate it  Needed to go to the ER for evaluation and was given IV fluids, Ativan and Toradol for her headache  Reports that she felt very emotional while taking the Venlafaxine for just a couple days     1/6/23: Ms Simone Malhotra a history of headaches that started around July 2022   The description of her headaches sound migrainous and she has a strong family history suggestive of migraines as well   I believe there are multiple contributing factors to her headaches including the genetic component, stress, anxiety and sleep difficulties  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[employer] [address] discussed potential treatment options, but thought that venlafaxine would be a good choice given her difficulties with sleep and mood   From an abortive standpoint, I have encouraged her to decrease her use of Tylenol throughout the week and I will prescribe Maxalt for abortive management   At this time based on a normal neurological exam and no significant red flags, I do not think brain imaging is warranted   If we have difficulty treating her headaches going forward, I will obtain an MRI of the brain for further evaluation     Past Medical History:     Past Medical History:   Diagnosis Date   • GERD (gastroesophageal reflux disease)    • Migraines        Patient Active Problem List   Diagnosis   • POWER (generalized anxiety disorder)   • GERD (gastroesophageal reflux disease)   • Acute non-recurrent frontal sinusitis       Medications:      Current Outpatient Medications   Medication Sig Dispense Refill   • acetaminophen (TYLENOL) 325 mg tablet Take 650 mg by mouth every 6 (six) hours as needed for mild pain     • albuterol (PROVENTIL HFA,VENTOLIN HFA) 90 mcg/act inhaler Inhale 2 puffs every 6 (six) hours as needed for wheezing 18 g 1   • cefuroxime (CEFTIN) 250 mg tablet Take 1 tablet (250 mg total) by mouth every 12 (twelve) hours for 7 days 14 tablet 0   • Cetirizine HCl (ZYRTEC ALLERGY PO) Take by mouth if needed     • ibuprofen (MOTRIN) 400 mg tablet Take by mouth every 6 (six) hours as needed for mild pain     • predniSONE 10 mg tablet 30 mg by mouth daily for 3 days, then 20 mg by mouth daily for 3 days, then 10 mg by mouth daily for 3 days, then stop 18 tablet 0   • rizatriptan (MAXALT-MLT) 10 mg disintegrating tablet Take 1 tablet (10 mg total) by mouth once as needed for migraine for up to 1 dose May repeat in 2 hours if "needed  Max 2 tablets in 24 hours (Patient not taking: Reported on 5/9/2023) 10 tablet 3     No current facility-administered medications for this visit  Allergies:    No Known Allergies    Family History:     Family History   Problem Relation Age of Onset   • Thyroid disease Mother    • No Known Problems Father    • Hypertension Maternal Grandmother    • Hypertension Maternal Grandfather        Social History:     Social History     Socioeconomic History   • Marital status: Single     Spouse name: Not on file   • Number of children: Not on file   • Years of education: Not on file   • Highest education level: Not on file   Occupational History   • Not on file   Tobacco Use   • Smoking status: Never   • Smokeless tobacco: Never   Vaping Use   • Vaping Use: Never used   Substance and Sexual Activity   • Alcohol use: Never   • Drug use: Never   • Sexual activity: Never   Other Topics Concern   • Not on file   Social History Narrative   • Not on file     Social Determinants of Health     Financial Resource Strain: Not on file   Food Insecurity: Not on file   Transportation Needs: Not on file   Physical Activity: Not on file   Stress: Not on file   Social Connections: Not on file   Intimate Partner Violence: Not on file   Housing Stability: Not on file         Objective:   Physical Exam:                                                               Vitals:            Constitutional:  /64 (BP Location: Left arm, Patient Position: Sitting, Cuff Size: Adult)   Pulse 75   Temp 98 5 °F (36 9 °C) (Temporal)   Ht 5' 8\" (1 727 m)   Wt 52 3 kg (115 lb 4 8 oz)   SpO2 97%   BMI 17 53 kg/m²   BP Readings from Last 3 Encounters:   05/12/23 103/64   05/09/23 102/60   05/05/23 92/58     Pulse Readings from Last 3 Encounters:   05/12/23 75   05/09/23 69   05/05/23 94         Well developed, well nourished, well groomed  No dysmorphic features  HEENT:  Normocephalic atraumatic   See neuro exam   Chest:  " Respirations appear regular and unlabored  Cardiovascular:  no observed significant swelling  Musculoskeletal:  (see below under neurologic exam for evaluation of motor function and gait)   Skin:  warm and dry, not diaphoretic  Psychiatric:  Normal behavior and appropriate affect       Neurological Examination:     Mental status/cognitive function:   Recent and remote memory intact  Attention span and concentration as well as fund of knowledge are appropriate for age  Normal language and spontaneous speech  Cranial Nerves:  III, IV, VI-Pupils were equal, round  Extraocular movements were full and conjugate   VII-facial expression symmetric  VIII-hearing grossly intact bilaterally   Motor Exam: symmetric bulk throughout  no atrophy, fasciculations or abnormal movements noted  Coordination:  no apparent dysmetria, ataxia or tremor noted  Gait: steady casual gait  Review of Systems:   Constitutional: Negative  Negative for appetite change and fever  HENT: Negative  Negative for hearing loss, tinnitus, trouble swallowing and voice change  Eyes: Positive for visual disturbance  Negative for photophobia and pain  Respiratory: Negative  Negative for shortness of breath  Cardiovascular: Negative  Negative for palpitations  Gastrointestinal: Negative  Negative for nausea and vomiting  Endocrine: Negative  Negative for cold intolerance  Genitourinary: Negative  Negative for dysuria, frequency and urgency  Musculoskeletal: Negative  Negative for gait problem, myalgias and neck pain  Skin: Negative  Negative for rash  Allergic/Immunologic: Negative  Neurological: Positive for headaches  Negative for dizziness, tremors, seizures, syncope, facial asymmetry, speech difficulty, weakness, light-headedness and numbness  Hematological: Negative  Does not bruise/bleed easily  Psychiatric/Behavioral: Negative  Negative for confusion, hallucinations and sleep disturbance     All other systems reviewed and are negative  I have spent 20 minutes with Patient  today in which greater than 50% of this time was spent in counseling/coordination of care regarding Prognosis, Risks and benefits of tx options, Patient and family education, Importance of tx compliance, Impressions, Documenting in the medical record, Reviewing / ordering tests, medicine, procedures   and Obtaining or reviewing history    I also spent 10 minutes non face to face for this patient the same day       Activity Minutes   Precharting/reviewing 5   Patient care/counseling 20   Postcharting/care coordination 5       Author:  Danay Avila DO 5/12/2023 2:02 PM

## 2023-05-12 NOTE — PATIENT INSTRUCTIONS
Additional Testing  MRI brain    Headache Calendar  Please maintain a headache calendar  Consider using phone applications such as Migraine [place] or [place] Migraine Tracker     Headache/migraine treatment:   Acute medications (for immediate treatment of a headache): It is ok to take ibuprofen, acetaminophen or naproxen (Advil, Tylenol,  Aleve, Excedrin) if they help your headaches you should limit these to No more than 2-3 times a week to avoid medication overuse/rebound headaches  For your more moderate to severe migraines take this medication early  Maxalt (rizatriptan) 10mg tabs - take one at the onset of headache  May repeat one time after 2 hours if pain has not resolved  (Max 2 a day and 10 a month)      Over the counter preventive supplements for headaches/migraines (if you try, try for 3 months straight)  (to take every day to help prevent headaches - not to take at the time of headache):  [x] Magnesium 400mg daily (If any diarrhea or upset stomach, decrease dose  as tolerated) - oxide or glycinate  [x] Riboflavin (Vitamin B2) 400mg daily - (FYI B2 may make your urine bright/neon yellow)     Lifestyle Recommendations:  [x] SLEEP - Maintain a regular sleep schedule: Adults need at least 7-8 hours of uninterrupted a night  Maintain good sleep hygiene:  Going to bed and waking up at consistent times, avoiding excessive daytime naps, avoiding caffeinated beverages in the evening, avoid excessive stimulation in the evening and generally using bed primarily for sleeping  One hour before bedtime would recommend turning lights down lower, decreasing your activity (may read quietly, listen to music at a low volume)  When you get into bed, should eliminate all technology (no texting, emailing, playing with your phone, iPad or tablet in bed)  [x] HYDRATION - Maintain good hydration  Drink  2L of fluid a day (4 typical small water bottles)  [x] DIET - Maintain good nutrition   In particular don't skip meals and try and eat healthy balanced meals regularly  [x] TRIGGERS - Look for other triggers and avoid them: Limit caffeine to 1-2 cups a day or less  Avoid dietary triggers that you have noticed bring on your headaches (this could include aged cheese, peanuts, MSG, aspartame and nitrates)  [x] EXERCISE - physical exercise as we all know is good for you in many ways, and not only is good for your heart, but also is beneficial for your mental health, cognitive health and  chronic pain/headaches  I would encourage at the least 5 days of physical exercise weekly for at least 30 minutes  Education and Follow-up  [x] Please call with any questions or concerns  Of course if any new concerning symptoms go to the emergency department    [x] Follow up in 6 months

## 2023-05-16 ENCOUNTER — CONSULT (OUTPATIENT)
Dept: GASTROENTEROLOGY | Facility: CLINIC | Age: 23
End: 2023-05-16

## 2023-05-16 VITALS
TEMPERATURE: 98.1 F | HEIGHT: 68 IN | SYSTOLIC BLOOD PRESSURE: 100 MMHG | WEIGHT: 117 LBS | DIASTOLIC BLOOD PRESSURE: 70 MMHG | BODY MASS INDEX: 17.73 KG/M2

## 2023-05-16 DIAGNOSIS — K59.00 CONSTIPATION, UNSPECIFIED CONSTIPATION TYPE: ICD-10-CM

## 2023-05-16 DIAGNOSIS — R10.9 ABDOMINAL PAIN, UNSPECIFIED ABDOMINAL LOCATION: Primary | ICD-10-CM

## 2023-05-16 DIAGNOSIS — Z83.79 FAMILY HISTORY OF CROHN'S DISEASE: ICD-10-CM

## 2023-05-16 NOTE — PROGRESS NOTES
"[facility] - Outpatient Consultation  Mandy Riojas 26 y o  female MRN: [id number]  Encounter: [id number]          ASSESSMENT AND PLAN:      Drake Duncan is a 27 y/o female with GERD, POWER who presents for consultation of constipation  1  Chronic idiopathic constipation  2  Heartburn   3  Family hx of IBD  Pt says that over the last few months while away at [address], she started having episodes of reflux that occurred almost daily  She admits that her diet was \"unhealthy\" and she was very stressed  She says that now that she is on summer vacation, she only gets reflux once in a while  She says that she had a \"stomch bug\" a couple of months ago that gave her diarrhea but she is now back to her baseline of constipation, which she has been dealing with since she was very young  she says she usually goes 2-3 days without a BM  TSH WNL  Pt is concerned as she has cousins and uncles with both UC and Crohn's    -explained to pt that these symptoms are not typical for IBD , rather I suspect her symptoms are due to poorly controlled constipation   -increase daily water intake  -start using miralax daily PRN constipation  -celiac serologies ordered; pt requests CRP and I will add on fecal jacqueline due to her family hx   -anti-gerd diet  -continue OTC pepcid PRN  ______________________________________________________________________    HPI:  Drake Duncan is a 27 y/o female with GERD, POWER who presents for consultation of constipation  Pt says that when she was away at college at [address], she had issues with reflux that would occur almost daily  She says she had a stomach bug which gave her diarrhea, but now she is back at her baseline constipation, which she has had since she was very young  She admits to eating unhealthy while at school but now that she is back home, she is only getting heartburn once in a while and OTC pepcid helps   Pt denies n/v, dysphagia, unintentional weight loss, fevers, chills, joint " pains, diarrhea, abdominal pain, bloody or black BMs, family hx of colon cancer, NSAID use  Pt says she has cousins and uncles with IBD, but no family hx of colon cancer  REVIEW OF SYSTEMS:    CONSTITUTIONAL: Denies any fever, chills, rigors, and weight loss  HEENT: No earache or tinnitus  Denies hearing loss or visual disturbances  CARDIOVASCULAR: No chest pain or palpitations  RESPIRATORY: Denies any cough, hemoptysis, shortness of breath or dyspnea on exertion  GASTROINTESTINAL: As noted in the History of Present Illness  GENITOURINARY: No problems with urination  Denies any hematuria or dysuria  NEUROLOGIC: No dizziness or vertigo, denies headaches  MUSCULOSKELETAL: Denies any muscle or joint pain  SKIN: Denies skin rashes or itching  ENDOCRINE: Denies excessive thirst  Denies intolerance to heat or cold  PSYCHOSOCIAL: Denies depression or anxiety  Denies any recent memory loss         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     Social History   Social History     Substance and Sexual Activity   Alcohol Use Never     Social History     Substance and Sexual Activity   Drug Use Never     Social History     Tobacco Use   Smoking Status Never   Smokeless Tobacco Never     Family History   Problem Relation Age of Onset   • Thyroid disease Mother    • No Known Problems Father    • Hypertension Maternal Grandmother    • Hypertension Maternal Grandfather        Meds/Allergies       Current Outpatient Medications:   •  acetaminophen (TYLENOL) 325 mg tablet  •  albuterol (PROVENTIL HFA,VENTOLIN HFA) 90 mcg/act inhaler  •  cefuroxime (CEFTIN) 250 mg tablet  •  Cetirizine HCl (ZYRTEC ALLERGY PO)  •  ibuprofen (MOTRIN) 400 mg tablet  •  predniSONE 10 mg tablet  •  rizatriptan (MAXALT-MLT) 10 mg disintegrating tablet    No Known Allergies        Objective     There were no vitals taken for this visit  There is no height or weight on file to calculate BMI  PHYSICAL EXAM:      General Appearance:   Alert, cooperative, no distress   HEENT:   Normocephalic, atraumatic, anicteric      Neck:  Supple, symmetrical, trachea midline   Lungs:   Clear to auscultation bilaterally; no rales, rhonchi or wheezing; respirations unlabored    Heart[de-identified]   Regular rate and rhythm; no murmur, rub, or gallop  Abdomen:   Soft, non-tender, non-distended; normal bowel sounds; no masses, no organomegaly    Genitalia:   Deferred    Rectal:   Deferred    Extremities:  No cyanosis, clubbing or edema    Pulses:  2+ and symmetric    Skin:  No jaundice, rashes, or lesions    Lymph nodes:  No palpable cervical lymphadenopathy        Lab Results:   No visits with results within 1 Day(s) from this visit     Latest known visit with results is:   Office Visit on 01/05/2023   Component Date Value   • White Blood Cell Count 01/06/2023 4 6    • Red Blood Cell Count 01/06/2023 4 84    • Hemoglobin 01/06/2023 14 3    • HCT 01/06/2023 42 0    • MCV 01/06/2023 87    • MCH 01/06/2023 29 5    • MCHC 01/06/2023 34 0    • RDW 01/06/2023 12 4    • Platelet Count 98/72/0420 239    • Neutrophils 01/06/2023 42    • Lymphocytes 01/06/2023 42    • Monocytes 01/06/2023 9    • Eosinophils 01/06/2023 5    • Basophils PCT 01/06/2023 2    • Neutrophils (Absolute) 01/06/2023 1 9    • Lymphocytes (Absolute) 01/06/2023 2 0    • Monocytes (Absolute) 01/06/2023 0 4    • Eosinophils (Absolute) 01/06/2023 0 2    • Basophils ABS 01/06/2023 0 1    • Immature Granulocytes 01/06/2023 0    • Immature Granulocytes (A* 01/06/2023 0 0    • Glucose, Random 01/06/2023 89    • BUN 01/06/2023 20    • Creatinine 01/06/2023 0 81    • eGFR 01/06/2023 107    • SL AMB BUN/CREATININE RA* 01/06/2023 25 (H)    • Sodium 01/06/2023 139    • Potassium 01/06/2023 4 6    • Chloride 01/06/2023 106    • CO2 01/06/2023 21 • CALCIUM 01/06/2023 9 3    • Protein, Total 01/06/2023 7 2    • Albumin 01/06/2023 4 6    • Globulin, Total 01/06/2023 2 6    • Albumin/Globulin Ratio 01/06/2023 1 8    • TOTAL BILIRUBIN 01/06/2023 0 3    • Alk Phos Isoenzymes 01/06/2023 56    • AST 01/06/2023 25    • ALT 01/06/2023 26    • TSH 01/06/2023 3 840          Radiology Results:   XR chest pa & lateral    Result Date: 5/12/2023  Narrative: CHEST INDICATION:   R05 3: Chronic cough  COMPARISON: 5/8/2013 EXAM PERFORMED/VIEWS:  XR CHEST PA & LATERAL Images: 2 FINDINGS: Cardiomediastinal silhouette appears unremarkable  The lungs are clear  No pneumothorax or pleural effusion  Osseous structures appear within normal limits for patient age  Impression: No acute cardiopulmonary disease  Findings are stable Workstation performed: [id number]     XR chest pa & lateral    Result Date: 5/9/2023  Narrative: CHEST INDICATION:   J40: Bronchitis, not specified as acute or chronic  COMPARISON:  None EXAM PERFORMED/VIEWS:  XR CHEST PA & LATERAL Images: 2 FINDINGS: Cardiomediastinal silhouette appears unremarkable  The lungs are clear  No pneumothorax or pleural effusion  Osseous structures appear within normal limits for patient age  Impression: No acute cardiopulmonary disease   Workstation performed: [id number]

## 2023-05-16 NOTE — PATIENT INSTRUCTIONS
Try to avoid triggers for reflux and write down what you think is a trigger  Drink at least 64 ounces of water/day   Start over-the-counter miralax daily as needed for constipation  Blood work and stool studies      GERD (Gastroesophageal Reflux Disease)   WHAT YOU NEED TO KNOW:   Gastroesophageal reflux disease (GERD) is reflux that happens more than 2 times a week for a few weeks  Reflux means acid and food in your stomach back up into your esophagus  GERD can cause other health problems over time if it is not treated  DISCHARGE INSTRUCTIONS:   Call your local emergency number (911 in the [address] [facility]) if:   You have severe chest pain and sudden trouble breathing  Return to the emergency department if:   You have trouble breathing after you vomit  You have trouble swallowing, or pain with swallowing  Your bowel movements are black, bloody, or tarry-looking  Your vomit looks like coffee grounds or has blood in it  Call your doctor or gastroenterologist if:   You feel full and cannot burp or vomit  You vomit large amounts, or you vomit often  You are losing weight without trying  Your symptoms get worse or do not improve with treatment  You have questions or concerns about your condition or care  Medicines:   Medicines  are used to decrease stomach acid  Medicine may also be used to help your lower esophageal sphincter and stomach contract (tighten) more  Take your medicine as directed  Contact your healthcare provider if you think your medicine is not helping or if you have side effects  Tell your provider if you are allergic to any medicine  Keep a list of the medicines, vitamins, and herbs you take  Include the amounts, and when and why you take them  Bring the list or the pill bottles to follow-up visits  Carry your medicine list with you in case of an emergency  Manage GERD:       Do not have foods or drinks that may increase heartburn    These include chocolate, peppermint, fried or fatty foods, drinks that contain caffeine, or carbonated drinks (soda)  Other foods include spicy foods, onions, tomatoes, and tomato-based foods  Do not have foods or drinks that can irritate your esophagus, such as citrus fruits, juices, and alcohol  Do not eat large meals  When you eat a lot of food at one time, your stomach needs more acid to digest it  Eat 6 small meals each day instead of 3 large ones, and eat slowly  Do not eat meals 2 to 3 hours before bedtime  Elevate the head of your bed  Place 6-inch blocks under the head of your bed frame  You may also use more than one pillow under your head and shoulders while you sleep  Maintain a healthy weight  If you are overweight, weight loss may help relieve symptoms of GERD  Do not smoke  Smoking weakens the lower esophageal sphincter and increases the risk of GERD  Ask your healthcare provider for information if you currently smoke and need help to quit  E-cigarettes or smokeless tobacco still contain nicotine  Talk to your healthcare provider before you use these products  Do not put pressure on your abdomen  Pressure pushes acid up into your esophagus  Do not wear clothing that is tight around your waist  Do not bend over  Bend at the knees if you need to pick something up  Follow up with your doctor or gastroenterologist as directed:  Write down your questions so you remember to ask them during your visits  © Copyright [employer] 2022 Information is for End User's use only and may not be sold, redistributed or otherwise used for commercial purposes  The above information is an  only  It is not intended as medical advice for individual conditions or treatments  Talk to your doctor, nurse or pharmacist before following any medical regimen to see if it is safe and effective for you

## 2023-06-01 ENCOUNTER — HOSPITAL ENCOUNTER (OUTPATIENT)
Dept: MRI IMAGING | Facility: HOSPITAL | Age: 23
Discharge: HOME | End: 2023-06-01
Attending: STUDENT IN AN ORGANIZED HEALTH CARE EDUCATION/TRAINING PROGRAM

## 2023-06-01 DIAGNOSIS — G43.109 MIGRAINE WITH AURA AND WITHOUT STATUS MIGRAINOSUS, NOT INTRACTABLE: ICD-10-CM

## 2023-06-09 LAB
CRP SERPL-MCNC: <1 MG/L (ref 0–10)
ENDOMYSIUM IGA SER QL: NEGATIVE
IGA SERPL-MCNC: 101 MG/DL (ref 87–352)
TTG IGA SER-ACNC: <2 U/ML (ref 0–3)

## 2023-07-04 PROBLEM — J01.10 ACUTE NON-RECURRENT FRONTAL SINUSITIS: Status: RESOLVED | Noted: 2023-05-05 | Resolved: 2023-07-04

## 2023-07-07 LAB — CALPROTECTIN STL-MCNT: 9 UG/G (ref 0–120)

## 2023-07-13 ENCOUNTER — TELEPHONE (OUTPATIENT)
Dept: OBGYN CLINIC | Facility: CLINIC | Age: 23
End: 2023-07-13

## 2023-07-13 ENCOUNTER — ANNUAL EXAM (OUTPATIENT)
Age: 23
End: 2023-07-13
Payer: COMMERCIAL

## 2023-07-13 VITALS
HEIGHT: 67 IN | SYSTOLIC BLOOD PRESSURE: 104 MMHG | BODY MASS INDEX: 18.87 KG/M2 | DIASTOLIC BLOOD PRESSURE: 60 MMHG | WEIGHT: 120.2 LBS

## 2023-07-13 DIAGNOSIS — Z01.419 ENCOUNTER FOR ANNUAL ROUTINE GYNECOLOGICAL EXAMINATION: Primary | ICD-10-CM

## 2023-07-13 DIAGNOSIS — Z83.2 FAMILY HISTORY OF FACTOR V LEIDEN MUTATION: ICD-10-CM

## 2023-07-13 PROBLEM — G43.009 MIGRAINE WITHOUT AURA: Status: ACTIVE | Noted: 2023-07-13

## 2023-07-13 PROCEDURE — G0145 SCR C/V CYTO,THINLAYER,RESCR: HCPCS | Performed by: OBSTETRICS & GYNECOLOGY

## 2023-07-13 PROCEDURE — S0612 ANNUAL GYNECOLOGICAL EXAMINA: HCPCS | Performed by: OBSTETRICS & GYNECOLOGY

## 2023-07-13 NOTE — PROGRESS NOTES
Assessment/Plan:    1. Encounter for annual routine gynecological examination    - Liquid-based pap, screening    2. Family history of factor V Leiden mutation    - Factor 5 leiden; Future      Subjective      Connie Bowers is a 26 y.o. female who presents for annual exam. Periods are regular. She is not sexually active yet. Considering contraception options. Has family h/o factor V Leiden with VTE and stroke on both sides of her family. Unaware of parents' status. She still experiences some vaginal odor. Current contraception: abstinence  History of abnormal Pap smear: no  Regular self breast exam: yes  History of abnormal mammogram: no  History of abnormal lipids: no    Menstrual History:  Nulligravida  Patient's last menstrual period was 06/13/2023 (exact date). Period Cycle (Days):  (28-35)  Period Duration (Days): 5-7  Menstrual Flow: Moderate, Heavy (Heavy 2 days, Moderate for 2 days)  Menstrual Control: Tampon, Thin pad  Menstrual Control Change Freq (Hours): Pad 4-5 hrs  Dysmenorrhea: (!) Mild  Dysmenorrhea Symptoms: Cramping    Past Medical History:   Diagnosis Date   • GERD (gastroesophageal reflux disease)    • Migraines        Family History   Problem Relation Age of Onset   • Thyroid disease Mother    • No Known Problems Father    • Hypertension Maternal Grandmother    • Hypertension Maternal Grandfather    • Factor V Leiden deficiency Maternal Uncle    • Factor V Leiden deficiency Paternal Uncle        The following portions of the patient's history were reviewed and updated as appropriate: allergies, current medications, past family history, past medical history, past social history, past surgical history and problem list.    Review of Systems  Pertinent items are noted in HPI.       Objective      /60 (BP Location: Right arm, Patient Position: Sitting, Cuff Size: Standard)   Ht 5' 6.5" (1.689 m)   Wt 54.5 kg (120 lb 3.2 oz)   LMP 06/13/2023 (Exact Date)   BMI 19.11 kg/m² General:   alert and oriented, in no acute distress, appears stated age and cooperative   Heart:  Breasts: regular rate and rhythm   appear normal, no suspicious masses, no skin or nipple changes or axillary nodes.    Lungs: effort normal   Abdomen: soft, non-tender, without masses or organomegaly   Vulva: normal   Vagina: normal mucosa   Cervix: no lesions   Uterus: normal size, mobile, non-tender   Adnexa: normal adnexa and no mass, fullness, tenderness

## 2023-07-14 ENCOUNTER — TELEPHONE (OUTPATIENT)
Dept: OBGYN CLINIC | Facility: CLINIC | Age: 23
End: 2023-07-14

## 2023-07-14 NOTE — TELEPHONE ENCOUNTER
Called [employer] who said patient needs auth thru [employer], called matthieu they said no [facility] is needed for this test code [id number], [facility]#[id number]

## 2023-07-20 LAB
LAB AP GYN PRIMARY INTERPRETATION: NORMAL
LAB AP LMP: NORMAL
Lab: NORMAL

## 2023-07-27 ENCOUNTER — TELEPHONE (OUTPATIENT)
Dept: GASTROENTEROLOGY | Facility: CLINIC | Age: 23
End: 2023-07-27

## 2023-07-27 NOTE — TELEPHONE ENCOUNTER
LMOM for patient to call back. Dr. Radha Billy is overbooked for 8:30 on 7/31/23. Wanted to move patient to the open 10:30 slot.

## 2023-07-28 ENCOUNTER — OFFICE VISIT (OUTPATIENT)
Dept: GASTROENTEROLOGY | Facility: CLINIC | Age: 23
End: 2023-07-28
Payer: COMMERCIAL

## 2023-07-28 VITALS
SYSTOLIC BLOOD PRESSURE: 102 MMHG | HEIGHT: 67 IN | DIASTOLIC BLOOD PRESSURE: 70 MMHG | WEIGHT: 118.6 LBS | BODY MASS INDEX: 18.62 KG/M2 | TEMPERATURE: 97 F

## 2023-07-28 DIAGNOSIS — K21.9 GASTROESOPHAGEAL REFLUX DISEASE WITHOUT ESOPHAGITIS: ICD-10-CM

## 2023-07-28 DIAGNOSIS — K59.00 CONSTIPATION, UNSPECIFIED CONSTIPATION TYPE: Primary | ICD-10-CM

## 2023-07-28 DIAGNOSIS — R11.0 NAUSEA: ICD-10-CM

## 2023-07-28 DIAGNOSIS — R10.9 ABDOMINAL PAIN, UNSPECIFIED ABDOMINAL LOCATION: ICD-10-CM

## 2023-07-28 PROCEDURE — 99214 OFFICE O/P EST MOD 30 MIN: CPT | Performed by: INTERNAL MEDICINE

## 2023-07-28 RX ORDER — POLYETHYLENE GLYCOL 3350 17 G/17G
238 POWDER, FOR SOLUTION ORAL ONCE
Qty: 238 G | Refills: 0 | Status: SHIPPED | OUTPATIENT
Start: 2023-07-28 | End: 2023-07-28

## 2023-07-28 RX ORDER — POLYETHYLENE GLYCOL 3350 17 G/17G
17 POWDER, FOR SOLUTION ORAL DAILY
Qty: 510 G | Refills: 5 | Status: SHIPPED | OUTPATIENT
Start: 2023-07-28 | End: 2024-01-24

## 2023-07-28 NOTE — PATIENT INSTRUCTIONS
Scheduled date of EGD(as of today):8/8/23   Physician performing EGD: Dr. Patsy Pandya  [facility] of [facility]  Instructions reviewed with patient by: Sandi  Clearances:  N/A

## 2023-07-28 NOTE — PROGRESS NOTES
[facility] - Outpatient Follow-up Note  Abdulkadir Ramirez 26 y.o. female MRN: [id number]  Encounter: [id number]          ASSESSMENT AND PLAN:      1. Constipation, unspecified constipation type  2. Abdominal pain  3. Nausea  4. GERD    Discussed with patient that uncontrolled constipation and/or peptic ulcer disease, gastritis, H. pylori infection, uncontrolled GERD can cause her upper GI symptoms. Recommend investigation with EGD. Patient to think about it and let us know in the future how to proceed. Continue Pepcid for now. We will work on controlling constipation. We will do MiraLAX prep. I discussed with patient about taking half the prep tonight and then repeating the half prep in the morning. Take enema prior to starting the prep. Enema was ordered as well. Increase hydration to 10 to 12 cups of water a day. If constipation is not adequately controlled then investigate with colonoscopy. RTC 4 months. Yvonne Tee MD  Gastroenterology  [facility]  Date: July 28, 2023        - polyethylene glycol [facility]) 17 g packet; Take 17 g by mouth daily  Dispense: 510 g; Refill: 5  - polyethylene glycol (MIRALAX) 17 g packet; Take 238 g by mouth once for 1 dose For bowel prep. Mix in 64 oz of gatorade. Drink 32 oz at the rate of 8 oz/1 glass every 15 mins starting at 5 pm on the evening prior to day of procedure. Drink the remaining 32 oz 5 hours prior to procedure time at at the rate of 8 oz/1 glass every 15 mins until finished. Dispense: 238 g; Refill: 0  - bisacodyl (FLEET) 10 MG/30ML ENEM; Insert 30 mL (10 mg total) into the rectum once for 1 dose  Dispense: 30 mL; Refill: 0    ______________________________________________________________________    SUBJECTIVE: 82-year-old with GERD, anxiety, constipation presents for follow-up. During last visit patient reported constipation since younger age, heartburn. No alarm symptoms.   At that time MiraLAX treatment, increasing hydration and taking over-the-counter Pepcid was discussed with patient. Celiac disease antibodies, CRP and calprotectin were ordered. These labs were performed and reviewed by me today and found to be normal/negative. Patient returns to office today, she reports that she continues to have daily nausea and heartburn. Nausea can occur spontaneously, can be short-lived. Sometimes symptoms occur after eating meals. She has been taking Pepcid as needed. She has been avoiding Pepcid as the calcium may give her more constipation. Her last bowel movement was 3 days ago. She continues to have hard stools which are of large volume. Currently not taking any medications for constipation. No blood in stools. Weight fluctuates. She has abdominal pain in the lower belly and in the central belly as well. Which can be crampy, intermittent. REVIEW OF SYSTEMS IS OTHERWISE NEGATIVE.       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   • WISDOM TOOTH EXTRACTION       Social History   Social History     Substance and Sexual Activity   Alcohol Use Yes    Comment: social     Social History     Substance and Sexual Activity   Drug Use Never     Social History     Tobacco Use   Smoking Status Never   Smokeless Tobacco Never     Family History   Problem Relation Age of Onset   • Thyroid disease Mother    • No Known Problems Father    • Hypertension Maternal Grandmother    • Hypertension Maternal Grandfather    • Factor V Leiden deficiency Maternal Uncle    • Factor V Leiden deficiency Paternal Uncle        Meds/Allergies       Current Outpatient Medications:   •  acetaminophen (TYLENOL) 325 mg tablet  •  bisacodyl (FLEET) 10 MG/30ML ENEM  •  Cetirizine HCl (ZYRTEC ALLERGY PO)  •  ibuprofen (MOTRIN) 400 mg tablet  •  polyethylene glycol (MIRALAX) 17 g packet  •  polyethylene glycol (MIRALAX) 17 g packet  •  rizatriptan (MAXALT-MLT) 10 mg disintegrating tablet    No Known Allergies        Objective     Blood pressure 102/70, temperature (!) 97 °F (36.1 °C), temperature source Tympanic, height 5' 6.5" (1.689 m), weight 53.8 kg (118 lb 9.6 oz), last menstrual period 06/13/2023. Body mass index is 18.86 kg/m². PHYSICAL EXAM:      General Appearance:   Alert, cooperative, no distress   HEENT:   Normocephalic, atraumatic, anicteric.     Neck:  Supple, symmetrical, trachea midline   Lungs:   Clear to auscultation bilaterally; no rales, rhonchi or wheezing; respirations unlabored    Heart[de-identified]   Regular rate and rhythm; no murmur, rub, or gallop. Abdomen:   Soft, non-tender, non-distended; normal bowel sounds; no masses, no organomegaly    Genitalia:   Deferred    Rectal:   Deferred    Extremities:  No cyanosis, clubbing or edema    Pulses:  2+ and symmetric    Skin:  No jaundice, rashes, or lesions    Lymph nodes:  No palpable cervical lymphadenopathy        Lab Results:   No visits with results within 1 Day(s) from this visit.    Latest known visit with results is:   Annual Exam on 07/13/2023   Component Date Value   • Case Report 07/13/2023                      Value:Gynecologic Cytology Report                       Case: [id number]                                  Authorizing Provider:  Ciara Guidry MD  Collected:           07/13/2023 0837              Ordering Location:     [address] [place] [address] Received:            07/13/2023 1790                                     View                                                                         First Screen:          Gerson Contreras, CT                                                       Specimen:    LIQUID-BASED PAP, SCREENING, Cervix, Endocervical                                         • Primary Interpretation 07/13/2023 Negative for intraepithelial lesion or malignancy    • Specimen Adequacy 07/13/2023 Satisfactory for evaluation. Endocervical/transformation zone component present. • Additional Information 07/13/2023                      Value: This result contains rich text formatting which cannot be displayed here. • LMP 07/13/2023 6/13/2023          Radiology Results:   No results found.

## 2023-08-03 ENCOUNTER — NURSE TRIAGE (OUTPATIENT)
Age: 23
End: 2023-08-03

## 2023-08-03 DIAGNOSIS — R11.0 NAUSEA: ICD-10-CM

## 2023-08-03 DIAGNOSIS — Z83.79 FAMILY HISTORY OF CROHN'S DISEASE: ICD-10-CM

## 2023-08-03 DIAGNOSIS — R10.9 ABDOMINAL PAIN, UNSPECIFIED ABDOMINAL LOCATION: ICD-10-CM

## 2023-08-03 DIAGNOSIS — K59.00 CONSTIPATION, UNSPECIFIED CONSTIPATION TYPE: Primary | ICD-10-CM

## 2023-08-03 RX ORDER — BISACODYL 5 MG/1
20 TABLET, DELAYED RELEASE ORAL ONCE
Qty: 4 TABLET | Refills: 0 | Status: SHIPPED | OUTPATIENT
Start: 2023-08-03 | End: 2023-08-03

## 2023-08-03 RX ORDER — DICYCLOMINE HYDROCHLORIDE 10 MG/1
10 CAPSULE ORAL
Qty: 120 CAPSULE | Refills: 1 | Status: SHIPPED | OUTPATIENT
Start: 2023-08-03 | End: 2023-10-02

## 2023-08-03 RX ORDER — DOCUSATE SODIUM 100 MG/1
100 CAPSULE, LIQUID FILLED ORAL 2 TIMES DAILY
Qty: 60 CAPSULE | Refills: 2 | Status: SHIPPED | OUTPATIENT
Start: 2023-08-03 | End: 2023-11-01

## 2023-08-03 NOTE — TELEPHONE ENCOUNTER
Answer Assessment - Initial Assessment Questions  1. LOCATION: "Where does it hurt?"       Below umbilicus  2. RADIATION: "Does the pain shoot anywhere else?" (e.g., chest, back)      no  3. ONSET: "When did the pain begin?" (e.g., minutes, hours or days ago)       Within past half hour  4. SUDDEN: "Gradual or sudden onset?"      sudden  5. PATTERN "Does the pain come and go, or is it constant?"     - If constant: "Is it getting better, staying the same, or worsening?"       (Note: Constant means the pain never goes away completely; most serious pain is constant and it progresses)      - If intermittent: "How long does it last?" "Do you have pain now?"      (Note: Intermittent means the pain goes away completely between bouts)     Right now constant  6. SEVERITY: "How bad is the pain?"  (e.g., Scale 1-10; mild, moderate, or severe)    - MILD (1-3): doesn't interfere with normal activities, abdomen soft and not tender to touch     - MODERATE (4-7): interferes with normal activities or awakens from sleep, tender to touch     - SEVERE (8-10): excruciating pain, doubled over, unable to do any normal activities       Moderate, worse if presses on area  7. RECURRENT SYMPTOM: "Have you ever had this type of stomach pain before?" If Yes, ask: "When was the last time?" and "What happened that time?"       Different pain than she has had in the past  8. CAUSE: "What do you think is causing the stomach pain?"      unknow  9. RELIEVING/AGGRAVATING FACTORS: "What makes it better or worse?" (e.g., movement, antacids, bowel movement)      Just happened so no input  10. OTHER SYMPTOMS: "Has there been any vomiting, diarrhea, constipation, or urine problems?"        Continues with constipation  11.  PREGNANCY: "Is there any chance you are pregnant?" "When was your last menstrual period?"        no    Protocols used: ABDOMINAL PAIN - FEMALE-ADULT-[place]    Last ov 7/28/23 Dr. Judith Dean  Scheduled for EGD 8/8/23    Patient states she followed your instructions for MiraLax prep for constipation, except she used Fleets suppository vs enema. She has been hydrating. She is producing some small pieces of stool and liquid. She just started with a sharp pain below umbilicus 56-53 minutes ago, pain level 5-6, worse if she presses on area. Should she take anything for pain? She does have the actual Fleet's enema on hand and I instructed her to use that now, increase her MiraLax to twice a day. She is interested in scheduling colonoscopy and if it can be added to 8/8/23 EGD or if not she would prefer colonoscopy be done first and reschedule EGD. Please advise.

## 2023-08-04 ENCOUNTER — TELEPHONE (OUTPATIENT)
Dept: GASTROENTEROLOGY | Facility: CLINIC | Age: 23
End: 2023-08-04

## 2023-08-04 ENCOUNTER — PREP FOR PROCEDURE (OUTPATIENT)
Dept: GASTROENTEROLOGY | Facility: CLINIC | Age: 23
End: 2023-08-04

## 2023-08-04 ENCOUNTER — NURSE TRIAGE (OUTPATIENT)
Age: 23
End: 2023-08-04

## 2023-08-04 DIAGNOSIS — R10.9 ABDOMINAL PAIN, UNSPECIFIED ABDOMINAL LOCATION: ICD-10-CM

## 2023-08-04 DIAGNOSIS — Z83.79 FAMILY HISTORY OF CROHN'S DISEASE: ICD-10-CM

## 2023-08-04 DIAGNOSIS — K59.00 CONSTIPATION, UNSPECIFIED CONSTIPATION TYPE: Primary | ICD-10-CM

## 2023-08-04 NOTE — TELEPHONE ENCOUNTER
Scheduled date of EGD/colonoscopy (as of today):08/08/2023  Physician performing EGD/colonoscopy:Mahi  Location of EGD/colonoscopy:[facility]  Desired bowel prep reviewed with patient:oliva  Instructions reviewed with patient by:Nancy  Clearances:  n/a

## 2023-08-04 NOTE — TELEPHONE ENCOUNTER
LMOM INFORMING PT SHE CAN CONTINUE TAKING MIRALAX THROUGH THE AM OF HER PREP DAY. PLEASE CALL BACK TO DISCUSS FURTHER OR IF ANY ADDITIONAL QUESTIONS.

## 2023-08-04 NOTE — TELEPHONE ENCOUNTER
----- Message from Olga Guillermo sent at 8/4/2023 10:52 AM EDT -----  Pt called in stating that she is currently taking Miralax for 30 days as directed by the doctor. She will be having a colonoscopy on 8/8/23. She would like to know does she need to stop taking the miralax since she will have to take the golytely or can she continue to the miralax.

## 2023-08-07 ENCOUNTER — NURSE TRIAGE (OUTPATIENT)
Age: 23
End: 2023-08-07

## 2023-08-07 RX ORDER — SODIUM CHLORIDE, SODIUM LACTATE, POTASSIUM CHLORIDE, CALCIUM CHLORIDE 600; 310; 30; 20 MG/100ML; MG/100ML; MG/100ML; MG/100ML
125 INJECTION, SOLUTION INTRAVENOUS CONTINUOUS
Status: CANCELLED | OUTPATIENT
Start: 2023-08-07

## 2023-08-07 NOTE — TELEPHONE ENCOUNTER
Patient questioning Dulcolax 5 mg prep instructions/order. Prep states take two 4 mg laxative tablets evening before procedure. Order submitted to pharmacy was for four tablets. I do not see any notes to increase dose prior to colon. I instructed patient to follow prep as noted and take the two 5 mg tabs. She has been taking the MiraLax daily as ordered at last visit and she has been having bowel movements.

## 2023-08-07 NOTE — TELEPHONE ENCOUNTER
Regarding: prescription questions  ----- Message from [facility] sent at 8/7/2023  4:05 PM EDT -----  Patients GI provider:  Dr. Alfonso Route     Number to return call: [phone]    Reason for call: Pt calling stating she is nervous about her procedure and would like to speak with a nurse to clarify a prescription.      Scheduled procedure/appointment date if applicable: Apt/procedure  8-8-2023

## 2023-08-08 ENCOUNTER — HOSPITAL ENCOUNTER (OUTPATIENT)
Dept: GASTROENTEROLOGY | Facility: HOSPITAL | Age: 23
Setting detail: OUTPATIENT SURGERY
Discharge: HOME | End: 2023-08-08
Attending: INTERNAL MEDICINE
Payer: COMMERCIAL

## 2023-08-08 ENCOUNTER — ANESTHESIA (OUTPATIENT)
Dept: GASTROENTEROLOGY | Facility: HOSPITAL | Age: 23
End: 2023-08-08

## 2023-08-08 ENCOUNTER — NURSE TRIAGE (OUTPATIENT)
Dept: OTHER | Facility: OTHER | Age: 23
End: 2023-08-08

## 2023-08-08 ENCOUNTER — ANESTHESIA EVENT (OUTPATIENT)
Dept: GASTROENTEROLOGY | Facility: HOSPITAL | Age: 23
End: 2023-08-08

## 2023-08-08 VITALS
SYSTOLIC BLOOD PRESSURE: 82 MMHG | RESPIRATION RATE: 16 BRPM | OXYGEN SATURATION: 100 % | WEIGHT: 118 LBS | BODY MASS INDEX: 18.52 KG/M2 | TEMPERATURE: 97.5 F | DIASTOLIC BLOOD PRESSURE: 46 MMHG | HEART RATE: 88 BPM | HEIGHT: 67 IN

## 2023-08-08 DIAGNOSIS — R11.0 NAUSEA: ICD-10-CM

## 2023-08-08 DIAGNOSIS — K21.9 GASTROESOPHAGEAL REFLUX DISEASE WITHOUT ESOPHAGITIS: ICD-10-CM

## 2023-08-08 DIAGNOSIS — K21.9 GASTROESOPHAGEAL REFLUX DISEASE, UNSPECIFIED WHETHER ESOPHAGITIS PRESENT: Primary | ICD-10-CM

## 2023-08-08 DIAGNOSIS — Z83.79 FAMILY HISTORY OF CROHN'S DISEASE: ICD-10-CM

## 2023-08-08 DIAGNOSIS — K59.00 CONSTIPATION, UNSPECIFIED CONSTIPATION TYPE: ICD-10-CM

## 2023-08-08 DIAGNOSIS — R10.9 ABDOMINAL PAIN, UNSPECIFIED ABDOMINAL LOCATION: ICD-10-CM

## 2023-08-08 LAB
EXT PREGNANCY TEST URINE: NEGATIVE
EXT. CONTROL: NORMAL

## 2023-08-08 PROCEDURE — 81025 URINE PREGNANCY TEST: CPT | Performed by: STUDENT IN AN ORGANIZED HEALTH CARE EDUCATION/TRAINING PROGRAM

## 2023-08-08 PROCEDURE — 88305 TISSUE EXAM BY PATHOLOGIST: CPT | Performed by: STUDENT IN AN ORGANIZED HEALTH CARE EDUCATION/TRAINING PROGRAM

## 2023-08-08 RX ORDER — SODIUM CHLORIDE, SODIUM LACTATE, POTASSIUM CHLORIDE, CALCIUM CHLORIDE 600; 310; 30; 20 MG/100ML; MG/100ML; MG/100ML; MG/100ML
20 INJECTION, SOLUTION INTRAVENOUS CONTINUOUS
Status: CANCELLED | OUTPATIENT
Start: 2023-08-08

## 2023-08-08 RX ORDER — PROPOFOL 10 MG/ML
INJECTION, EMULSION INTRAVENOUS CONTINUOUS PRN
Status: DISCONTINUED | OUTPATIENT
Start: 2023-08-08 | End: 2023-08-08

## 2023-08-08 RX ORDER — OMEPRAZOLE 40 MG/1
40 CAPSULE, DELAYED RELEASE ORAL DAILY
Qty: 30 CAPSULE | Refills: 5 | Status: SHIPPED | OUTPATIENT
Start: 2023-08-08 | End: 2024-02-04

## 2023-08-08 RX ORDER — SODIUM CHLORIDE, SODIUM LACTATE, POTASSIUM CHLORIDE, CALCIUM CHLORIDE 600; 310; 30; 20 MG/100ML; MG/100ML; MG/100ML; MG/100ML
125 INJECTION, SOLUTION INTRAVENOUS CONTINUOUS
Status: DISCONTINUED | OUTPATIENT
Start: 2023-08-08 | End: 2023-08-12 | Stop reason: HOSPADM

## 2023-08-08 RX ORDER — GLYCOPYRROLATE 0.2 MG/ML
INJECTION INTRAMUSCULAR; INTRAVENOUS AS NEEDED
Status: DISCONTINUED | OUTPATIENT
Start: 2023-08-08 | End: 2023-08-08

## 2023-08-08 RX ORDER — LIDOCAINE HCL/PF 100 MG/5ML
SYRINGE (ML) INJECTION AS NEEDED
Status: DISCONTINUED | OUTPATIENT
Start: 2023-08-08 | End: 2023-08-08

## 2023-08-08 RX ORDER — PROPOFOL 10 MG/ML
INJECTION, EMULSION INTRAVENOUS AS NEEDED
Status: DISCONTINUED | OUTPATIENT
Start: 2023-08-08 | End: 2023-08-08

## 2023-08-08 RX ORDER — SODIUM CHLORIDE, SODIUM LACTATE, POTASSIUM CHLORIDE, CALCIUM CHLORIDE 600; 310; 30; 20 MG/100ML; MG/100ML; MG/100ML; MG/100ML
INJECTION, SOLUTION INTRAVENOUS CONTINUOUS PRN
Status: DISCONTINUED | OUTPATIENT
Start: 2023-08-08 | End: 2023-08-08

## 2023-08-08 RX ADMIN — SODIUM CHLORIDE, SODIUM LACTATE, POTASSIUM CHLORIDE, AND CALCIUM CHLORIDE 125 ML/HR: .6; .31; .03; .02 INJECTION, SOLUTION INTRAVENOUS at 10:40

## 2023-08-08 RX ADMIN — PROPOFOL 150 MG: 10 INJECTION, EMULSION INTRAVENOUS at 11:42

## 2023-08-08 RX ADMIN — GLYCOPYRROLATE 0.2 MG: 0.2 INJECTION INTRAMUSCULAR; INTRAVENOUS at 11:42

## 2023-08-08 RX ADMIN — LIDOCAINE HYDROCHLORIDE 50 MG: 20 INJECTION INTRAVENOUS at 11:42

## 2023-08-08 RX ADMIN — PROPOFOL 140 MCG/KG/MIN: 10 INJECTION, EMULSION INTRAVENOUS at 11:42

## 2023-08-08 RX ADMIN — SODIUM CHLORIDE, SODIUM LACTATE, POTASSIUM CHLORIDE, AND CALCIUM CHLORIDE: .6; .31; .03; .02 INJECTION, SOLUTION INTRAVENOUS at 11:39

## 2023-08-08 NOTE — H&P
History and Physical -  [facility]  Diana Almeida 26 y.o. female MRN: [id number]                  HPI: Diana Almeida is a 26y.o. year old female who presents for EGD and colonoscopy to investigate abdominal pain, nausea, GERD and constipation. REVIEW OF SYSTEMS: Per the HPI, and otherwise unremarkable. 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   • WISDOM TOOTH EXTRACTION       Social History   Social History     Substance and Sexual Activity   Alcohol Use Yes    Comment: social     Social History     Substance and Sexual Activity   Drug Use Never     Social History     Tobacco Use   Smoking Status Never   Smokeless Tobacco Never     Family History   Problem Relation Age of Onset   • Thyroid disease Mother    • No Known Problems Father    • Hypertension Maternal Grandmother    • Hypertension Maternal Grandfather    • Factor V Leiden deficiency Maternal Uncle    • Factor V Leiden deficiency Paternal Uncle        Meds/Allergies     (Not in a hospital admission)      No Known Allergies    Objective     Blood pressure 128/64, pulse 99, temperature 98.3 °F (36.8 °C), temperature source Temporal, resp. rate 18, height 5' 7" (1.702 m), weight 53.5 kg (118 lb), last menstrual period 07/13/2023, SpO2 100 %. PHYSICAL EXAM    Gen: NAD  CV: RRR  CHEST: Clear  ABD: soft, NT/ND  EXT: no edema      ASSESSMENT/PLAN:  Diana Almeida is a 26y.o. year old female who presents for EGD and colonoscopy to investigate abdominal pain, nausea, GERD and constipation. The patient is stable and optimized for the procedure, we reviewed risk and benefits. Risk include but not limited to infection, bleeding, perforation and missing a lesion.

## 2023-08-08 NOTE — TELEPHONE ENCOUNTER
Regarding: colonoscopy  ----- Message from [facility] sent at 8/8/2023  5:23 AM EDT -----  "I have my colonoscopy this morning and I just vomited off the 2nd part.  Is this normal?"

## 2023-08-08 NOTE — TELEPHONE ENCOUNTER
Scheduled for Colonoscopy/EGD at 1030 AM. Patient states she vomited second part of Prep, but has plenty left, (about 5 cups). States she did drink 32 OZ on 8/7. Also reports diarrhea but still dark, not "straw" colored, chills/shakes. No additional symptoms. On call provider contacted and informed of patient’s concerns and status. Provider recommended: Should delay. Will send some zofran and new prep. Preferably reschedule to tomorrow. Patient informed of provider’s recommendation. Disagreeing and will like callback. .  Provider called and advise patient:can try to finish prep by 8 AM and attempt procedure. aware it will most likely be a poor study. No further questions.

## 2023-08-08 NOTE — TELEPHONE ENCOUNTER
Reason for Disposition  • [1] Caller has URGENT question or concern AND [2] triager unable to answer question    Answer Assessment - Initial Assessment Questions  1. DATE/TIME: "When did you have your colonoscopy?"      8/8 this AM  2. MAIN CONCERN: "What is your main concern right now?" "What questions do you have?"     Vomiting second part of prep   3. ABDOMEN PAIN: "Are you having any abdomen (belly or stomach) pain?" If Yes, ask: "How bad is it?" (e.g., Scale 1-10; mild, moderate, severe). - MILD (1-3): doesn't interfere with normal activities, abdomen soft and not tender to touch      - MODERATE (4-7): interferes with normal activities or awakens from sleep, tender to touch      - SEVERE (8-10): excruciating pain, doubled over, unable to do any normal activities       Denies   4. OTHER SYMPTOMS: "What other symptoms are you having?" (e.g., rectal bleeding, bloating or feeling gassy, passing gas, vomiting, dizziness, fever). Nausea   5. ONSET: "When did your symptoms start?"     This morning   6.  PATTERN: "Is the symptom(s) constant or does it come and go?" "Is your symptom(s) getting worse, better, or staying the same?"    Better after vomiting    Protocols used: COLONOSCOPY SYMPTOMS AND QUESTIONS-ADULT-AH

## 2023-08-08 NOTE — ANESTHESIA POSTPROCEDURE EVALUATION
Post-Op Assessment Note    CV Status:  Stable  Pain Score: 0    Pain management: adequate     Mental Status:  Alert and awake   Hydration Status:  Euvolemic   PONV Controlled:  Controlled   Airway Patency:  Patent      Post Op Vitals Reviewed: Yes      Staff: CRNA         No notable events documented.     BP   90/50   Temp   97   Pulse  78   Resp   12   SpO2   99

## 2023-08-08 NOTE — TELEPHONE ENCOUNTER
Called by healthcall nurse as patient vomitted 2nd half of prep. Called patient. Stools still brown but liquid. Likely vomitted due to quick ingestion of prep. Still has about 1/2 of golytely left. Instructed patient to drink as much of prep until 8AM. Also asked patient to chill solution for tolerability. Can proceed with EGD and colonoscopy though patient is aware that colonoscopy may be a poor study and may need to be aborted early. All questions were answered.

## 2023-08-08 NOTE — ANESTHESIA PREPROCEDURE EVALUATION
Procedure:  EGD  COLONOSCOPY    Relevant Problems   ANESTHESIA (within normal limits)  No family hx of anes comp      CARDIO (within normal limits)   (+) Migraine without aura      ENDO (within normal limits)      GI/HEPATIC  Confirmed NPO appropriate  s/p bowel prep  Vomiting x1 with prep at ~4 am, denies current nausea/bloating   (+) GERD (gastroesophageal reflux disease)      /RENAL (within normal limits)      GYN (within normal limits)   (-) Currently pregnant (urinary beta hcg neg in preop holding)      HEMATOLOGY (within normal limits)      MUSCULOSKELETAL (within normal limits)      NEURO/PSYCH   (+) POWER (generalized anxiety disorder)   (+) Migraine without aura      PULMONARY   (-) Smoking   (-) URI (upper respiratory infection)      Other   (+) Family history of factor V Leiden mutation        Physical Exam    Airway    Mallampati score: I  TM Distance: >3 FB  Neck ROM: full     Dental   Comment: Wire behind top front teeth,     Cardiovascular  Rhythm: regular, Rate: normal,     Pulmonary  Breath sounds clear to auscultation,     Other Findings        Anesthesia Plan  ASA Score- 2     Anesthesia Type- IV sedation with anesthesia with ASA Monitors. Additional Monitors:   Airway Plan:     Comment: I discussed the risks and benefits of IV sedation anesthesia including the possibility of the need to convert to general anesthesia and the potential risk of awareness. The patient was given the opportunity to ask questions, which were answered. .       Plan Factors-Exercise tolerance (METS): >4 METS. Chart reviewed. Patient is not a current smoker. Induction- intravenous. Postoperative Plan-     Informed Consent- Anesthetic plan and risks discussed with patient and mother. I personally reviewed this patient with the CRNA. Discussed and agreed on the Anesthesia Plan with the CRNA. Jose Maria Zhang

## 2023-08-10 PROCEDURE — 88305 TISSUE EXAM BY PATHOLOGIST: CPT | Performed by: STUDENT IN AN ORGANIZED HEALTH CARE EDUCATION/TRAINING PROGRAM

## 2023-08-11 ENCOUNTER — PREP FOR PROCEDURE (OUTPATIENT)
Dept: GASTROENTEROLOGY | Facility: CLINIC | Age: 23
End: 2023-08-11

## 2023-08-11 ENCOUNTER — TELEPHONE (OUTPATIENT)
Dept: GASTROENTEROLOGY | Facility: CLINIC | Age: 23
End: 2023-08-11

## 2023-08-11 DIAGNOSIS — K59.00 CONSTIPATION, UNSPECIFIED CONSTIPATION TYPE: ICD-10-CM

## 2023-08-11 DIAGNOSIS — R10.9 ABDOMINAL PAIN, UNSPECIFIED ABDOMINAL LOCATION: Primary | ICD-10-CM

## 2023-08-11 DIAGNOSIS — Z83.79 FAMILY HISTORY OF CROHN'S DISEASE: ICD-10-CM

## 2023-08-11 RX ORDER — BISACODYL 5 MG/1
20 TABLET, DELAYED RELEASE ORAL ONCE
Qty: 4 TABLET | Refills: 0 | Status: SHIPPED | OUTPATIENT
Start: 2023-08-11 | End: 2023-08-11

## 2023-08-11 NOTE — TELEPHONE ENCOUNTER
Scheduled date of colonoscopy (as of today):11/20/2023  Physician performing colonoscopy:Dr Champagne  [facility] [place] [facility]2 day perp-mailed 8/11  Instructions reviewed with patient by:Nancy  Clearances:  n/a

## 2023-08-17 ENCOUNTER — APPOINTMENT (OUTPATIENT)
Dept: LAB | Facility: HOSPITAL | Age: 23
End: 2023-08-17
Attending: OBSTETRICS & GYNECOLOGY
Payer: COMMERCIAL

## 2023-08-17 DIAGNOSIS — Z83.2 FAMILY HISTORY OF FACTOR V LEIDEN MUTATION: ICD-10-CM

## 2023-08-17 PROCEDURE — 81241 F5 GENE: CPT

## 2023-08-17 PROCEDURE — 36415 COLL VENOUS BLD VENIPUNCTURE: CPT

## 2023-08-24 LAB
F5 GENE MUT ANL BLD/T: NORMAL
Lab: NORMAL

## 2023-08-27 DIAGNOSIS — Z83.79 FAMILY HISTORY OF CROHN'S DISEASE: ICD-10-CM

## 2023-08-27 DIAGNOSIS — K59.00 CONSTIPATION, UNSPECIFIED CONSTIPATION TYPE: ICD-10-CM

## 2023-08-27 DIAGNOSIS — R11.0 NAUSEA: ICD-10-CM

## 2023-08-27 DIAGNOSIS — R10.9 ABDOMINAL PAIN, UNSPECIFIED ABDOMINAL LOCATION: ICD-10-CM

## 2023-08-28 RX ORDER — DICYCLOMINE HYDROCHLORIDE 10 MG/1
CAPSULE ORAL
Qty: 360 CAPSULE | Refills: 1 | Status: SHIPPED | OUTPATIENT
Start: 2023-08-28

## 2023-08-31 DIAGNOSIS — R11.0 NAUSEA: ICD-10-CM

## 2023-08-31 DIAGNOSIS — K21.9 GASTROESOPHAGEAL REFLUX DISEASE, UNSPECIFIED WHETHER ESOPHAGITIS PRESENT: ICD-10-CM

## 2023-08-31 RX ORDER — OMEPRAZOLE 40 MG/1
40 CAPSULE, DELAYED RELEASE ORAL DAILY
Qty: 90 CAPSULE | Refills: 2 | Status: SHIPPED | OUTPATIENT
Start: 2023-08-31

## 2023-11-02 ENCOUNTER — ANESTHESIA EVENT (OUTPATIENT)
Dept: ANESTHESIOLOGY | Facility: HOSPITAL | Age: 23
End: 2023-11-02

## 2023-11-02 ENCOUNTER — ANESTHESIA (OUTPATIENT)
Dept: ANESTHESIOLOGY | Facility: HOSPITAL | Age: 23
End: 2023-11-02

## 2023-11-09 ENCOUNTER — TELEMEDICINE (OUTPATIENT)
Dept: GASTROENTEROLOGY | Facility: CLINIC | Age: 23
End: 2023-11-09
Payer: COMMERCIAL

## 2023-11-09 DIAGNOSIS — K21.9 GASTROESOPHAGEAL REFLUX DISEASE, UNSPECIFIED WHETHER ESOPHAGITIS PRESENT: Primary | ICD-10-CM

## 2023-11-09 DIAGNOSIS — K44.9 HIATAL HERNIA: ICD-10-CM

## 2023-11-09 DIAGNOSIS — R10.9 ABDOMINAL PAIN, UNSPECIFIED ABDOMINAL LOCATION: ICD-10-CM

## 2023-11-09 DIAGNOSIS — K59.00 CONSTIPATION, UNSPECIFIED CONSTIPATION TYPE: ICD-10-CM

## 2023-11-09 PROCEDURE — 99214 OFFICE O/P EST MOD 30 MIN: CPT | Performed by: INTERNAL MEDICINE

## 2023-11-09 NOTE — PROGRESS NOTES
Virtual Regular Visit    Verification of patient location:    Patient is located at Home in the following state in which I hold an active license [place]      Assessment/Plan:    Problem List Items Addressed This Visit    None  1. GERD  2. Hiatal hernia  3. Chronic constipation  4. Abdominal pain    Her abdominal pain is lower. Constipation. Discussed with patient about taking MiraLAX daily to achieve 1-2 soft loose bowel movements per day. She can increase the dose up to 3 times a day if needed of MiraLAX to achieve the goal of 1-2 soft loose bowel movements per day. She will need to take 2-day prep prior to procedure. Discussed with patient about doing half of MiraLAX prep 2 nights before the procedure. She would need to be on 2 days of clear liquid diet prior to the procedure. She will need to take the Dulcolax GoLytely prep the night before the procedure. Patient agreeable with plan. Continue omeprazole. Encourage patient to take the MiraLAX as consistently as possible to control the constipation. Also discussed with patient that she will need to take omeprazole for a long time. She has hiatal hernia that predisposes to acid reflux. After few months we could do a trial of decrease dose omeprazole i.e. 20 mg daily and see if that controls acid reflux symptoms. RTC 6 months. Triny Callahan MD  Gastroenterology  [facility]  Date: November 9, 2023           Reason for visit is   Chief Complaint   Patient presents with    Follow-up     Wants to go over upcoming colonoscopy. Virtual Regular Visit          Encounter provider Triny Callahan MD    Provider located at [address] [place] [address]  [phone]      Recent Visits  No visits were found meeting these conditions.   Showing recent visits within past 7 days and meeting all other requirements  Today's Visits  Date Type Provider Dept   11/09/23 Telemedicine Mohamud Stoll  [address]   Showing today's visits and meeting all other requirements  Future Appointments  No visits were found meeting these conditions. Showing future appointments within next 150 days and meeting all other requirements       The patient was identified by name and date of birth. Luma Landin was informed that this is a telemedicine visit and that the visit is being conducted through the [employer] vLine. She agrees to proceed. .  My office door was closed. No one else was in the room. She acknowledged consent and understanding of privacy and security of the video platform. The patient has agreed to participate and understands they can discontinue the visit at any time. Patient is aware this is a billable service. Subjective  Luma Landin is a 26 y.o. female with anxiety, GERD, small hiatal hernia, presents for follow-up. During last visit patient reported abdominal pain, nausea, heartburn, constipation. Patient was started on MiraLAX. EGD and colonoscopy were ordered. EGD was performed which showed small hiatal hernia. Gastric and duodenal biopsies taken during the procedure the path report of which was reviewed by me today did not show any significant disease. Omeprazole was started 40 mg daily. Colonoscopy could not be completed due to poor prep. Repeat was recommended in 3 months with extended prep. Currently, patient reports that heartburn is mostly controlled with omeprazole. She takes omeprazole 40 mg daily. She is wondering if she would be taking omeprazole for a long time. She takes MiraLAX on most days sometimes forgets taking it. Her constipation has decreased but not completely controlled. She also also wondering if she needs to take Bentyl and simethicone.     Patient will have labs done in June 2023 which were reviewed by me today and showed normal celiac antibody, normal CRP levels and negative fecal calprotectin. 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    WISDOM TOOTH EXTRACTION         Current Outpatient Medications   Medication Sig Dispense Refill    acetaminophen (TYLENOL) 325 mg tablet Take 650 mg by mouth every 6 (six) hours as needed for mild pain      Cetirizine HCl (ZYRTEC ALLERGY PO) Take by mouth if needed      dicyclomine (BENTYL) 10 mg capsule TAKE 1 CAPSULE BY MOUTH 4 TIMES A DAY (BEFORE MEALS AND AT BEDTIME) 360 capsule 1    ibuprofen (MOTRIN) 400 mg tablet Take by mouth every 6 (six) hours as needed for mild pain      omeprazole (PriLOSEC) 40 MG capsule TAKE 1 CAPSULE BY MOUTH EVERY DAY 90 capsule 2    polyethylene glycol (MIRALAX) 17 g packet Take 17 g by mouth daily 510 g 5    rizatriptan (MAXALT-MLT) 10 mg disintegrating tablet Take 1 tablet (10 mg total) by mouth once as needed for migraine for up to 1 dose May repeat in 2 hours if needed. Max 2 tablets in 24 hours 10 tablet 3    bisacodyl (DULCOLAX) 5 mg EC tablet Take 4 tablets (20 mg total) by mouth once for 1 dose Colonoscopy prep 4 tablet 0    bisacodyl (DULCOLAX) 5 mg EC tablet Take 4 tablets (20 mg total) by mouth once for 1 dose Colonoscopy prep 4 tablet 0    bisacodyl (FLEET) 10 MG/30ML ENEM Insert 30 mL (10 mg total) into the rectum once for 1 dose 30 mL 0    docusate sodium (COLACE) 100 mg capsule Take 1 capsule (100 mg total) by mouth 2 (two) times a day 60 capsule 2    polyethylene glycol (GOLYTELY) 4000 mL solution Take 4,000 mL by mouth once for 1 dose Colonoscopy prep 4000 mL 0    polyethylene glycol (GOLYTELY) 4000 mL solution Take 4,000 mL by mouth once for 1 dose Colonoscopy prep 4000 mL 0    polyethylene glycol (MIRALAX) 17 g packet Take 238 g by mouth once for 1 dose For bowel prep. Mix in 64 oz of gatorade.  Drink 32 oz at the rate of 8 oz/1 glass every 15 mins starting at 5 pm on the evening prior to day of procedure. Drink the remaining 32 oz 5 hours prior to procedure time at at the rate of 8 oz/1 glass every 15 mins until finished. 238 g 0     No current facility-administered medications for this visit. No Known Allergies    Review of Systems    Video Exam    There were no vitals filed for this visit.     Physical Exam     Visit Time  Total Visit Duration: 25 munutes

## 2023-11-16 ENCOUNTER — TELEPHONE (OUTPATIENT)
Dept: NEUROLOGY | Facility: CLINIC | Age: 23
End: 2023-11-16

## 2023-11-16 ENCOUNTER — TELEPHONE (OUTPATIENT)
Age: 23
End: 2023-11-16

## 2023-11-16 NOTE — TELEPHONE ENCOUNTER
Pt called in to cancel upcoming appt on 11/20/23 at 9am w/Rory. Pt is having a colonscopy on this date- unable to come or do virtual - must move appt but stated to just cancel for now -she is a dough- will call back to r/s later.

## 2023-11-16 NOTE — TELEPHONE ENCOUNTER
Patient contacted office regarding colonoscopy 11/20/2023. Patient was cancelled and did not cancel it.  Patient was transferred to office to further assist.

## 2023-11-17 RX ORDER — SODIUM CHLORIDE 9 MG/ML
125 INJECTION, SOLUTION INTRAVENOUS CONTINUOUS
Status: CANCELLED | OUTPATIENT
Start: 2023-11-17

## 2023-11-20 ENCOUNTER — HOSPITAL ENCOUNTER (OUTPATIENT)
Dept: GASTROENTEROLOGY | Facility: MEDICAL CENTER | Age: 23
Setting detail: OUTPATIENT SURGERY
Discharge: HOME | End: 2023-11-20
Payer: COMMERCIAL

## 2023-11-20 ENCOUNTER — ANESTHESIA EVENT (OUTPATIENT)
Dept: GASTROENTEROLOGY | Facility: MEDICAL CENTER | Age: 23
End: 2023-11-20

## 2023-11-20 ENCOUNTER — ANESTHESIA (OUTPATIENT)
Dept: GASTROENTEROLOGY | Facility: MEDICAL CENTER | Age: 23
End: 2023-11-20

## 2023-11-20 VITALS
WEIGHT: 117 LBS | RESPIRATION RATE: 20 BRPM | OXYGEN SATURATION: 100 % | BODY MASS INDEX: 18.36 KG/M2 | SYSTOLIC BLOOD PRESSURE: 90 MMHG | TEMPERATURE: 98.1 F | HEART RATE: 56 BPM | HEIGHT: 67 IN | DIASTOLIC BLOOD PRESSURE: 52 MMHG

## 2023-11-20 DIAGNOSIS — Z83.79 FAMILY HISTORY OF CROHN'S DISEASE: ICD-10-CM

## 2023-11-20 DIAGNOSIS — R10.9 ABDOMINAL PAIN, UNSPECIFIED ABDOMINAL LOCATION: ICD-10-CM

## 2023-11-20 DIAGNOSIS — K59.00 CONSTIPATION, UNSPECIFIED CONSTIPATION TYPE: ICD-10-CM

## 2023-11-20 LAB
EXT PREGNANCY TEST URINE: NEGATIVE
EXT. CONTROL: NORMAL

## 2023-11-20 PROCEDURE — 45378 DIAGNOSTIC COLONOSCOPY: CPT | Performed by: INTERNAL MEDICINE

## 2023-11-20 PROCEDURE — 81025 URINE PREGNANCY TEST: CPT | Performed by: PAIN MEDICINE

## 2023-11-20 RX ORDER — PROPOFOL 10 MG/ML
INJECTION, EMULSION INTRAVENOUS CONTINUOUS PRN
Status: DISCONTINUED | OUTPATIENT
Start: 2023-11-20 | End: 2023-11-20

## 2023-11-20 RX ORDER — SODIUM CHLORIDE 9 MG/ML
125 INJECTION, SOLUTION INTRAVENOUS CONTINUOUS
Status: DISCONTINUED | OUTPATIENT
Start: 2023-11-20 | End: 2023-11-24 | Stop reason: HOSPADM

## 2023-11-20 RX ORDER — PROPOFOL 10 MG/ML
INJECTION, EMULSION INTRAVENOUS AS NEEDED
Status: DISCONTINUED | OUTPATIENT
Start: 2023-11-20 | End: 2023-11-20

## 2023-11-20 RX ADMIN — PROPOFOL 50 MG: 10 INJECTION, EMULSION INTRAVENOUS at 15:28

## 2023-11-20 RX ADMIN — PROPOFOL 160 MCG/KG/MIN: 10 INJECTION, EMULSION INTRAVENOUS at 15:26

## 2023-11-20 RX ADMIN — PROPOFOL 30 MG: 10 INJECTION, EMULSION INTRAVENOUS at 15:23

## 2023-11-20 RX ADMIN — Medication 40 MG: at 15:28

## 2023-11-20 RX ADMIN — PROPOFOL 120 MG: 10 INJECTION, EMULSION INTRAVENOUS at 15:20

## 2023-11-20 RX ADMIN — SODIUM CHLORIDE: 0.9 INJECTION, SOLUTION INTRAVENOUS at 15:08

## 2023-11-20 NOTE — ANESTHESIA PREPROCEDURE EVALUATION
Procedure:  COLONOSCOPY    Relevant Problems   CARDIO   (+) Migraine without aura      GI/HEPATIC   (+) GERD (gastroesophageal reflux disease)      NEURO/PSYCH   (+) POWER (generalized anxiety disorder)   (+) Migraine without aura        Physical Exam    Airway    Mallampati score: II  TM Distance: >3 FB  Neck ROM: full     Dental   No notable dental hx     Cardiovascular  Rhythm: regular, Rate: normal, Cardiovascular exam normal    Pulmonary  Pulmonary exam normal Breath sounds clear to auscultation    Other Findings  post-pubertal.      Anesthesia Plan  ASA Score- 2     Anesthesia Type- IV sedation with anesthesia with ASA Monitors. Additional Monitors:     Airway Plan:            Plan Factors-    Chart reviewed. Existing labs reviewed. Patient summary reviewed. Induction-     Postoperative Plan-     Informed Consent- Anesthetic plan and risks discussed with patient.

## 2023-11-20 NOTE — H&P
History and Physical -  [facility]  Loni Chun 26 y.o. female MRN: [id number]                  HPI: Loni Chun is a 26y.o. year old female who presents for colonoscopy to investigate constipation and abdominal pain. REVIEW OF SYSTEMS: Per the HPI, and otherwise unremarkable. Historical Information   Past Medical History:   Diagnosis Date    GERD (gastroesophageal reflux disease)     Migraines      Past Surgical History:   Procedure Laterality Date    COLONOSCOPY      FETAL SURGERY FOR CONGENITAL HERNIA Right 06/2002    pt was a premie    WISDOM TOOTH EXTRACTION       Social History   Social History     Substance and Sexual Activity   Alcohol Use Yes    Comment: social     Social History     Substance and Sexual Activity   Drug Use Never     Social History     Tobacco Use   Smoking Status Never   Smokeless Tobacco Never     Family History   Problem Relation Age of Onset    Thyroid disease Mother     No Known Problems Father     Hypertension Maternal Grandmother     Hypertension Maternal Grandfather     Factor V Leiden deficiency Maternal Uncle     Factor V Leiden deficiency Paternal Uncle        Meds/Allergies     (Not in a hospital admission)      No Known Allergies    Objective     Height 5' 7" (1.702 m), weight 53.1 kg (117 lb), last menstrual period 10/31/2023. PHYSICAL EXAM    Gen: NAD  CV: RRR  CHEST: Clear  ABD: soft, NT/ND  EXT: no edema      ASSESSMENT/PLAN:  Loni Chun is a 26y.o. year old female who presents for colonoscopy to investigate constipation and abdominal pain. The patient is stable and optimized for the procedure, we reviewed risk and benefits. Risk include but not limited to infection, bleeding, perforation and missing a lesion.

## 2023-11-20 NOTE — ANESTHESIA POSTPROCEDURE EVALUATION
Post-Op Assessment Note    CV Status:  Stable    Pain management: adequate       Mental Status:  Alert and awake   Hydration Status:  Euvolemic   PONV Controlled:  Controlled   Airway Patency:  Patent     Post Op Vitals Reviewed: Yes    No anethesia notable event occurred.     Staff: Anesthesiologist               BP 90/52 (11/20/23 1605)    Temp      Pulse 56 (11/20/23 1601)   Resp 20 (11/20/23 1601)    SpO2 100 % (11/20/23 1601)

## 2023-11-22 ENCOUNTER — OFFICE VISIT (OUTPATIENT)
Dept: FAMILY MEDICINE CLINIC | Facility: HOSPITAL | Age: 23
End: 2023-11-22
Payer: COMMERCIAL

## 2023-11-22 VITALS
TEMPERATURE: 97 F | DIASTOLIC BLOOD PRESSURE: 62 MMHG | BODY MASS INDEX: 18.65 KG/M2 | WEIGHT: 118.8 LBS | HEIGHT: 67 IN | SYSTOLIC BLOOD PRESSURE: 102 MMHG | HEART RATE: 92 BPM | OXYGEN SATURATION: 98 %

## 2023-11-22 DIAGNOSIS — J01.10 ACUTE NON-RECURRENT FRONTAL SINUSITIS: Primary | ICD-10-CM

## 2023-11-22 PROCEDURE — 99213 OFFICE O/P EST LOW 20 MIN: CPT | Performed by: NURSE PRACTITIONER

## 2023-11-22 NOTE — PROGRESS NOTES
Assessment/Plan:     Resolving sinusitis. Still with pressure in sinuses and ears. Ear exam was normal.   Continue with Flonase and Zyrtec. Call with no improvement or worsening. Diagnoses and all orders for this visit:    Acute non-recurrent frontal sinusitis          Subjective:     Patient ID: Vanesa Contreras is a 26 y.o. female. Last Tuesday (8 days ago) woke up not feeling great with 101 fever went to urgent care dx with sinus infection with fluid in ears. Took prednisone for 5 days finished sat but still having pressure in both ears with more pressure in right ear. Pressure also felt when bending over and feeling the pressure more laying down. No recent fever, cough, headache, with a mild amount of post nasal drip. Has been taking flonase and zytrec and has been helping. Denies chest pain and SOB. Tried advil cold and sinus but no relief. Review of Systems   Constitutional:  Positive for fever (resolved). HENT:  Positive for ear pain (pressure), postnasal drip and sinus pressure. Respiratory:  Negative for cough. Neurological:  Negative for headaches. The following portions of the patient's history were reviewed and updated as appropriate: allergies, current medications, past family history, past medical history, past social history, past surgical history and problem list.    Objective:  Vitals:    11/22/23 1429   BP: 102/62   Pulse: 92   Temp: (!) 97 °F (36.1 °C)   SpO2: 98%      Physical Exam  Vitals reviewed. Constitutional:       General: She is not in acute distress. Appearance: Normal appearance. She is not ill-appearing. HENT:      Right Ear: Tympanic membrane, ear canal and external ear normal.      Left Ear: Tympanic membrane, ear canal and external ear normal.      Mouth/Throat:      Mouth: Mucous membranes are moist.      Pharynx: Oropharynx is clear. Cardiovascular:      Rate and Rhythm: Normal rate and regular rhythm.       Heart sounds: Normal heart sounds. No murmur heard. Pulmonary:      Effort: Pulmonary effort is normal.      Breath sounds: Normal breath sounds. Lymphadenopathy:      Cervical: No cervical adenopathy. Skin:     General: Skin is warm and dry. Neurological:      Mental Status: She is alert and oriented to person, place, and time. Psychiatric:         Mood and Affect: Mood normal.         Behavior: Behavior normal.         Thought Content:  Thought content normal.         Judgment: Judgment normal.

## 2024-05-10 ENCOUNTER — OFFICE VISIT (OUTPATIENT)
Dept: FAMILY MEDICINE CLINIC | Facility: HOSPITAL | Age: 24
End: 2024-05-10
Payer: COMMERCIAL

## 2024-05-10 VITALS
WEIGHT: 123.2 LBS | TEMPERATURE: 97.6 F | DIASTOLIC BLOOD PRESSURE: 60 MMHG | SYSTOLIC BLOOD PRESSURE: 102 MMHG | BODY MASS INDEX: 19.34 KG/M2 | HEIGHT: 67 IN | HEART RATE: 70 BPM

## 2024-05-10 DIAGNOSIS — R21 RASH OF FACE: Primary | ICD-10-CM

## 2024-05-10 PROCEDURE — 99213 OFFICE O/P EST LOW 20 MIN: CPT | Performed by: INTERNAL MEDICINE

## 2024-05-10 RX ORDER — DESONIDE 0.5 MG/G
CREAM TOPICAL 2 TIMES DAILY
Qty: 15 G | Refills: 1 | Status: SHIPPED | OUTPATIENT
Start: 2024-05-10 | End: 2024-05-20

## 2024-05-10 NOTE — PROGRESS NOTES
Name: Becky Hong      : 2000      MRN: [id number]  Encounter Provider: Bharati Ybarra DO  Encounter Date: 5/10/2024   Encounter department: [facility] [address]     Assessment & Plan     1. Rash of face  Comments:  looks more eczematoid then a contact dermatitis, urged to avoid rubbing and use cool compresses prn itching/watery allergy symptoms, will trial low dose desonide cream bid x 10 days then 3 days off then 10 days on, importance OF NOT GETTING NEAR LASHES OR IN THE EYE was reviewed, can use her face moisturizer on lids as well to keep area hydrated, urged if not better to call OR with new/worse symptoms  Orders:  -     desonide (DESOWEN) 0.05 % cream; Apply topically 2 (two) times a day for 10 days           Subjective      HPI Pt here for an acute visit    Pt noting approx 2 wks of rash on upper eye lids B/L. She notes the rash is itchy at times. The skin is flaky on the region as well.  She notes a h/o eczema and has had flares in the past but they are usually in the winter. She notes she has allergies and they have been bad. She also thought it cold be related to mascara use but she has stopped the mascara.  She notes no other new soaps/lotions/detergents. She does not use a lot of eye make-up.   She notes her eyes are watering more outside in the sun/light sensitivity. She notes no double vision /pain in eye/N/V. She has been using some of her Cetaphil moisturizer on her lids and then tried Aquafor w/o great benefit.        Review of Systems   Constitutional:  Negative for chills and fever.   HENT:  Positive for postnasal drip and rhinorrhea. Negative for congestion and sore throat.    Eyes:  Positive for photophobia and discharge. Negative for pain, redness and visual disturbance.   Respiratory:  Positive for cough. Negative for shortness of breath.    Gastrointestinal:  Negative for nausea.   Skin:  Positive for rash. Negative for wound.   Neurological:   Negative for dizziness and headaches.   Hematological:  Negative for adenopathy.   Psychiatric/Behavioral:  Negative for confusion.        Current Outpatient Medications on File Prior to Visit   Medication Sig    acetaminophen (TYLENOL) 325 mg tablet Take 650 mg by mouth every 6 (six) hours as needed for mild pain    bisacodyl (DULCOLAX) 5 mg EC tablet Take 4 tablets (20 mg total) by mouth once for 1 dose Colonoscopy prep    bisacodyl (DULCOLAX) 5 mg EC tablet Take 4 tablets (20 mg total) by mouth once for 1 dose Colonoscopy prep    bisacodyl (FLEET) 10 MG/30ML ENEM Insert 30 mL (10 mg total) into the rectum once for 1 dose    Cetirizine HCl (ZYRTEC ALLERGY PO) Take by mouth if needed    dicyclomine (BENTYL) 10 mg capsule TAKE 1 CAPSULE BY MOUTH 4 TIMES A DAY (BEFORE MEALS AND AT BEDTIME)    docusate sodium (COLACE) 100 mg capsule Take 1 capsule (100 mg total) by mouth 2 (two) times a day    ibuprofen (MOTRIN) 400 mg tablet Take by mouth every 6 (six) hours as needed for mild pain    omeprazole (PriLOSEC) 40 MG capsule TAKE 1 CAPSULE BY MOUTH EVERY DAY    polyethylene glycol (GOLYTELY) 4000 mL solution Take 4,000 mL by mouth once for 1 dose Colonoscopy prep    polyethylene glycol (GOLYTELY) 4000 mL solution Take 4,000 mL by mouth once for 1 dose Colonoscopy prep    polyethylene glycol (MIRALAX) 17 g packet Take 17 g by mouth daily    polyethylene glycol (MIRALAX) 17 g packet Take 238 g by mouth once for 1 dose For bowel prep. Mix in 64 oz of gatorade. Drink 32 oz at the rate of 8 oz/1 glass every 15 mins starting at 5 pm on the evening prior to day of procedure. Drink the remaining 32 oz 5 hours prior to procedure time at at the rate of 8 oz/1 glass every 15 mins until finished.    rizatriptan (MAXALT-MLT) 10 mg disintegrating tablet Take 1 tablet (10 mg total) by mouth once as needed for migraine for up to 1 dose May repeat in 2 hours if needed. Max 2 tablets in 24 hours       Objective     /60   Pulse 70    "Temp 97.6 °F (36.4 °C)   Ht 5' 7\" (1.702 m)   Wt 55.9 kg (123 lb 3.2 oz)   BMI 19.30 kg/m²     Physical Exam  Vitals and nursing note reviewed.   Constitutional:       General: She is not in acute distress.     Appearance: She is well-developed. She is not ill-appearing.   HENT:      Head: Normocephalic and atraumatic.      Right Ear: Tympanic membrane and external ear normal. There is no impacted cerumen.      Left Ear: Tympanic membrane and external ear normal. There is no impacted cerumen.      Mouth/Throat:      Mouth: Mucous membranes are moist.      Pharynx: Oropharynx is clear.   Eyes:      General:         Right eye: No discharge.         Left eye: No discharge.      Extraocular Movements: Extraocular movements intact.      Conjunctiva/sclera: Conjunctivae normal.      Pupils: Pupils are equal, round, and reactive to light.   Neck:      Trachea: No tracheal deviation.   Cardiovascular:      Rate and Rhythm: Normal rate and regular rhythm.      Heart sounds: Normal heart sounds. No murmur heard.     No friction rub.   Pulmonary:      Effort: Pulmonary effort is normal. No respiratory distress.      Breath sounds: Normal breath sounds. No wheezing, rhonchi or rales.   Musculoskeletal:      Cervical back: Neck supple.   Lymphadenopathy:      Cervical: No cervical adenopathy.   Skin:     General: Skin is warm.      Coloration: Skin is not pale.      Findings: Rash present.      Comments: B/L upper lids with mild erythema with some dry flaky skin (primarily at skin crease of lid)   Neurological:      General: No focal deficit present.      Mental Status: She is alert.      Motor: No abnormal muscle tone.      Gait: Gait normal.   Psychiatric:         Mood and Affect: Mood normal.         Behavior: Behavior normal.         Thought Content: Thought content normal.         Judgment: Judgment normal.       Bharati Ybarra DO    "

## 2024-05-13 ENCOUNTER — OFFICE VISIT (OUTPATIENT)
Dept: FAMILY MEDICINE CLINIC | Facility: HOSPITAL | Age: 24
End: 2024-05-13
Payer: COMMERCIAL

## 2024-05-13 VITALS
SYSTOLIC BLOOD PRESSURE: 98 MMHG | TEMPERATURE: 97.7 F | WEIGHT: 122 LBS | BODY MASS INDEX: 19.15 KG/M2 | HEIGHT: 67 IN | DIASTOLIC BLOOD PRESSURE: 62 MMHG | HEART RATE: 69 BPM

## 2024-05-13 DIAGNOSIS — J01.00 ACUTE NON-RECURRENT MAXILLARY SINUSITIS: Primary | ICD-10-CM

## 2024-05-13 PROCEDURE — 99213 OFFICE O/P EST LOW 20 MIN: CPT | Performed by: NURSE PRACTITIONER

## 2024-05-13 RX ORDER — AMOXICILLIN AND CLAVULANATE POTASSIUM 875; 125 MG/1; MG/1
1 TABLET, FILM COATED ORAL EVERY 12 HOURS SCHEDULED
Qty: 20 TABLET | Refills: 0 | Status: SHIPPED | OUTPATIENT
Start: 2024-05-13 | End: 2024-05-23

## 2024-05-13 NOTE — PROGRESS NOTES
Name: Becky Hong      : 2000      MRN: [id number]  Encounter Provider: SHERI Watkins  Encounter Date: 2024   Encounter department: [facility]ARE [address]     Assessment & Plan     1. Acute non-recurrent maxillary sinusitis  -     amoxicillin-clavulanate (AUGMENTIN) 875-125 mg per tablet; Take 1 tablet by mouth every 12 (twelve) hours for 10 days    Will treat persistent/worsening sx's with antibiotic at this time  Continue OTC meds prn, adequate rest and fluids  Reviewed worse/persistent sx's to call with       Subjective      States she has been sick for almost 2 weeks, she thought was allergies initially. Took allergy meds and OTC sinus meds without relief. Symptoms getting worse now with left sided cheek and teeth pain.        Review of Systems   Constitutional:  Negative for chills and fever.   HENT:  Positive for congestion, dental problem (left sided teeth pain), sinus pressure (left sided) and sinus pain (left sided). Negative for ear pain and sore throat.    Respiratory:  Positive for cough.    Musculoskeletal:  Positive for myalgias (legs).       Current Outpatient Medications on File Prior to Visit   Medication Sig    acetaminophen (TYLENOL) 325 mg tablet Take 650 mg by mouth every 6 (six) hours as needed for mild pain    Cetirizine HCl (ZYRTEC ALLERGY PO) Take by mouth if needed    desonide (DESOWEN) 0.05 % cream Apply topically 2 (two) times a day for 10 days    ibuprofen (MOTRIN) 400 mg tablet Take by mouth every 6 (six) hours as needed for mild pain    omeprazole (PriLOSEC) 40 MG capsule TAKE 1 CAPSULE BY MOUTH EVERY DAY    rizatriptan (MAXALT-MLT) 10 mg disintegrating tablet Take 1 tablet (10 mg total) by mouth once as needed for migraine for up to 1 dose May repeat in 2 hours if needed. Max 2 tablets in 24 hours    [DISCONTINUED] polyethylene glycol (MIRALAX) 17 g packet Take 17 g by mouth daily    dicyclomine (BENTYL) 10 mg capsule TAKE 1 CAPSULE BY  "MOUTH 4 TIMES A DAY (BEFORE MEALS AND AT BEDTIME) (Patient not taking: Reported on 5/13/2024)    [DISCONTINUED] bisacodyl (DULCOLAX) 5 mg EC tablet Take 4 tablets (20 mg total) by mouth once for 1 dose Colonoscopy prep (Patient not taking: Reported on 5/13/2024)    [DISCONTINUED] bisacodyl (DULCOLAX) 5 mg EC tablet Take 4 tablets (20 mg total) by mouth once for 1 dose Colonoscopy prep (Patient not taking: Reported on 5/13/2024)    [DISCONTINUED] bisacodyl (FLEET) 10 MG/30ML ENEM Insert 30 mL (10 mg total) into the rectum once for 1 dose (Patient not taking: Reported on 5/13/2024)    [DISCONTINUED] docusate sodium (COLACE) 100 mg capsule Take 1 capsule (100 mg total) by mouth 2 (two) times a day (Patient not taking: Reported on 5/13/2024)    [DISCONTINUED] polyethylene glycol (GOLYTELY) 4000 mL solution Take 4,000 mL by mouth once for 1 dose Colonoscopy prep (Patient not taking: Reported on 5/13/2024)    [DISCONTINUED] polyethylene glycol (GOLYTELY) 4000 mL solution Take 4,000 mL by mouth once for 1 dose Colonoscopy prep    [DISCONTINUED] polyethylene glycol (MIRALAX) 17 g packet Take 238 g by mouth once for 1 dose For bowel prep. Mix in 64 oz of gatorade. Drink 32 oz at the rate of 8 oz/1 glass every 15 mins starting at 5 pm on the evening prior to day of procedure. Drink the remaining 32 oz 5 hours prior to procedure time at at the rate of 8 oz/1 glass every 15 mins until finished. (Patient not taking: Reported on 5/13/2024)       Objective     BP 98/62   Pulse 69   Temp 97.7 °F (36.5 °C)   Ht 5' 7\" (1.702 m)   Wt 55.3 kg (122 lb)   BMI 19.11 kg/m²       Physical Exam  Vitals reviewed.   Constitutional:       General: She is not in acute distress.     Appearance: Normal appearance.   HENT:      Head: Normocephalic.      Right Ear: Tympanic membrane normal.      Left Ear: Tympanic membrane normal.      Nose: Congestion present.      Right Sinus: No maxillary sinus tenderness or frontal sinus tenderness.      " Left Sinus: Maxillary sinus tenderness and frontal sinus tenderness present.      Mouth/Throat:      Mouth: Mucous membranes are moist.      Pharynx: Oropharynx is clear. No posterior oropharyngeal erythema.   Eyes:      General:         Right eye: No discharge.         Left eye: No discharge.   Cardiovascular:      Rate and Rhythm: Normal rate and regular rhythm.      Heart sounds: No murmur heard.  Pulmonary:      Effort: Pulmonary effort is normal. No respiratory distress.      Breath sounds: Normal breath sounds.      Comments: No cough  Musculoskeletal:      Cervical back: Normal range of motion.   Lymphadenopathy:      Cervical: No cervical adenopathy.   Skin:     General: Skin is warm and dry.   Neurological:      General: No focal deficit present.      Mental Status: She is alert and oriented to person, place, and time.   Psychiatric:         Mood and Affect: Mood normal.         Behavior: Behavior normal.         SHERI Watkins

## 2024-06-23 DIAGNOSIS — R11.0 NAUSEA: ICD-10-CM

## 2024-06-23 DIAGNOSIS — K21.9 GASTROESOPHAGEAL REFLUX DISEASE, UNSPECIFIED WHETHER ESOPHAGITIS PRESENT: ICD-10-CM

## 2024-06-23 RX ORDER — OMEPRAZOLE 40 MG/1
40 CAPSULE, DELAYED RELEASE ORAL DAILY
Qty: 90 CAPSULE | Refills: 1 | Status: SHIPPED | OUTPATIENT
Start: 2024-06-23

## 2024-07-16 ENCOUNTER — NURSE TRIAGE (OUTPATIENT)
Age: 24
End: 2024-07-16

## 2024-07-16 ENCOUNTER — OFFICE VISIT (OUTPATIENT)
Dept: DERMATOLOGY | Facility: CLINIC | Age: 24
End: 2024-07-16
Payer: COMMERCIAL

## 2024-07-16 DIAGNOSIS — L21.9 SEBORRHEIC DERMATITIS: Primary | ICD-10-CM

## 2024-07-16 PROCEDURE — 99214 OFFICE O/P EST MOD 30 MIN: CPT

## 2024-07-16 RX ORDER — KETOCONAZOLE 20 MG/ML
SHAMPOO TOPICAL
Qty: 100 ML | Refills: 10 | Status: SHIPPED | OUTPATIENT
Start: 2024-07-16

## 2024-07-16 RX ORDER — PIMECROLIMUS 10 MG/G
CREAM TOPICAL
Qty: 30 G | Refills: 2 | Status: SHIPPED | OUTPATIENT
Start: 2024-07-16

## 2024-07-16 NOTE — TELEPHONE ENCOUNTER
"Patient called in regarding headaches. She was seen by Neurology years ago for Migraines and they subsided with the use of Magnesium and other homeopathic modalities. Within the past few weeks, they have returned. They come and go and vary in intensity. They effect different parts of her head, neck and sinuses. She reports minor congestion which she takes OTC allergy medication for with relief. She has no fever and can touch her chin to her chest. Taking OTC medications helps the headaches. She is concerned because she does have tingling on occasion to one arm or another for a short period of time. She reports tension in her neck and shoulders. She reports being under stress with finishing school and starting a new job. She admits to not staying hydrated. OV made for 8/1/24 0740. Patient will keep headache diary until visit. She will hydrate. If she has an intense headache or occurrence of numbness in extremities she will go to ED. She would like to discuss getting an MRI of head and neck at [facility].    Reason for Disposition   Headache is a chronic symptom (recurrent or ongoing AND lasting > 4 weeks)    Answer Assessment - Initial Assessment Questions  1. LOCATION: \"Where does it hurt?\"       Base of skull and neck, top of head, sinus area over eye  2. ONSET: \"When did the headache start?\" (Minutes, hours or days)       Ongoing for a long time, worse this last week  3. PATTERN: \"Does the pain come and go, or has it been constant since it started?\"      Comes and goes  4. SEVERITY: \"How bad is the pain?\" and \"What does it keep you from doing?\"  (e.g., Scale 1-10; mild, moderate, or severe)    - MILD (1-3): doesn't interfere with normal activities     - MODERATE (4-7): interferes with normal activities or awakens from sleep     - SEVERE (8-10): excruciating pain, unable to do any normal activities         varies  5. RECURRENT SYMPTOM: \"Have you ever had headaches before?\" If Yes, ask: \"When was the last time?\" and \"What " "happened that time?\"       Yes, sees neurologist   6. CAUSE: \"What do you think is causing the headache?\"      Tension possibly   7. MIGRAINE: \"Have you been diagnosed with migraine headaches?\" If Yes, ask: \"Is this headache similar?\"       Yes, did try medication  8. HEAD INJURY: \"Has there been any recent injury to the head?\"       no  9. OTHER SYMPTOMS: \"Do you have any other symptoms?\" (fever, stiff neck, eye pain, sore throat, cold symptoms)      Stiff neck left side and shoulders, left arm feels weak, tingling in both hands, congestion, mostly behind left eye  10. PREGNANCY: \"Is there any chance you are pregnant?\" \"When was your last menstrual period?\"        Currently menstruating    Protocols used: Headache-ADULT-[place]    "

## 2024-07-16 NOTE — PROGRESS NOTES
"[facility] Note     Patient Name: Becky Hong  Encounter Date: 07/16/24     Have you been cared for by a [facility] Dermatologist in the last 3 years and, if so, which description applies to you?    Yes.  I have been here within the last 3 years, and my medical history has NOT changed since that time.  I am FEMALE/of child-bearing potential.    REVIEW OF SYSTEMS:  Have you recently had or currently have any of the following? No changes in my recent health.   PAST MEDICAL HISTORY:  Have you personally ever had or currently have any of the following?  If \"YES,\" then please provide more detail. No changes in my medical history.   HISTORY OF IMMUNOSUPPRESSION: Do you have a history of any of the following:  Systemic Immunosuppression such as Diabetes, Biologic or Immunotherapy, Chemotherapy, Organ Transplantation, Bone Marrow Transplantation?  No     Answering \"YES\" requires the addition of the dotphrase \"IMMUNOSUPPRESSED\" as the first diagnosis of the patient's visit.   FAMILY HISTORY:  Any \"first degree relatives\" (parent, brother, sister, or child) with the following?    No changes in my family's known health.   PATIENT EXPERIENCE:    Do you want the Dermatologist to perform a COMPLETE skin exam today including a clinical examination under the \"bra and underwear\" areas?  NO  If necessary, do we have your permission to call and leave a detailed message on your Preferred Phone number that includes your specific medical information?  Yes      No Known Allergies   Current Outpatient Medications:     acetaminophen (TYLENOL) 325 mg tablet, Take 650 mg by mouth every 6 (six) hours as needed for mild pain, Disp: , Rfl:     Cetirizine HCl (ZYRTEC ALLERGY PO), Take by mouth if needed, Disp: , Rfl:     desonide (DESOWEN) 0.05 % cream, Apply topically 2 (two) times a day for 10 days, Disp: 15 g, Rfl: 1    dicyclomine (BENTYL) 10 mg capsule, TAKE 1 CAPSULE BY MOUTH 4 TIMES A DAY (BEFORE MEALS AND AT " BEDTIME) (Patient not taking: Reported on 5/13/2024), Disp: 360 capsule, Rfl: 1    ibuprofen (MOTRIN) 400 mg tablet, Take by mouth every 6 (six) hours as needed for mild pain, Disp: , Rfl:     omeprazole (PriLOSEC) 40 MG capsule, TAKE 1 CAPSULE BY MOUTH EVERY DAY, Disp: 90 capsule, Rfl: 1    rizatriptan (MAXALT-MLT) 10 mg disintegrating tablet, Take 1 tablet (10 mg total) by mouth once as needed for migraine for up to 1 dose May repeat in 2 hours if needed. Max 2 tablets in 24 hours, Disp: 10 tablet, Rfl: 3          Whom besides the patient is providing clinical information about today's encounter?   NO ADDITIONAL HISTORIAN (patient alone provided history)    Physical Exam and Assessment/Plan by Diagnosis:      SEBORRHEIC DERMATITIS-SUSPECTED; versus Atopic Dermatitis or Contact Dermatitis      Physical Exam:  Anatomic Location Affected &  Morphological Description:  Greasy adherent scale/scaling plaques on the upper and lower eyelids with diffuse flaking throughout scalp       Additional History of Present Condition:  Patient notes rash started in April on her upper and lower eyelids. It is very itchy and flaky and she notes swelling sometimes and feeling of soreness. She thought it may have been caused by her eye shadow so she has stopped using makeup altogether. She uses gentle skin care products like CeraVe gentle cleanser. She denies getting her nails done around time of rash and rubbing her eyes. She denies a family history of psoriasis. She does note she had atopic dermatitis as a child. She has been using Desonide 0.05% cream as prescribed by her PCP for 10 days with a 5 day break and use as needed. Patient notes rash did resolve but would return.     Assessment and Plan:  Based on a thorough discussion of this condition and the management approach to it (including a comprehensive discussion of the known risks, side effects and potential benefits of treatment), the patient (family) agrees to implement the  following specific plan:           SCALP  Start Ketoconazole 2% shampoo- Lather into scalp and allow to sit for 5-10 min then rinse. Please apply three times a week (Monday, Wednesday, Friday). Can use as face wash as well.           FACE  Can use Ketoconazole 2% shampoo as face wash.   Start Hydrocortisone 2.5% ointment- apply twice a day to upper and lower eyelids on its own for 3 days, then combine with Elidel (pimecrolimus 1% cream) and apply twice a day for 2 days, then transition to application of Elidel on its own for next five days or until inflammation resolves. Then use Elidel as needed.   Discussed patch testing if no improvement with topicals as this could be a contact dermatitis.   Continue using CeraVe Gentle Facial Cleanser.  Recommend avoiding use of makeup at this time.   Recommends using a mineral base sunscreen with active ingredients of zinc oxide or titanium dioxide such as Neutrogena Sheer Zinc sunscreen.   Follow-up: 1 month.        Scribe Attestation      I,:  Eloina Iraheta am acting as a scribe while in the presence of the attending physician.:       I,:  Madhuri Adler PA-C personally performed the services described in this documentation    as scribed in my presence.:

## 2024-07-16 NOTE — TELEPHONE ENCOUNTER
----- Message from Funmilayo ALBA sent at 7/16/2024 12:54 PM EDT -----  Patient calling in with bad migraines and pain for about 1 week. Left side of face from eye to cheek/jaw area tense pain in jaw and tingling in cheek area. No fever, nausea, vomiting. Please call to discuss. Thank you!

## 2024-07-17 NOTE — TELEPHONE ENCOUNTER
Patient calling back. Headache moved today to the right eye and she has had some blurry vision. She has had headaches in the past and does have neurologist however she can not get in with them. Advised that migraines can cause vision changes on occasion. She has had auras in the past or blurry vision however she just has not had one in a while. Looking for recommendations prior to appt with you on 8/1. Please add her to cancellation list.

## 2024-07-18 ENCOUNTER — HOSPITAL ENCOUNTER (EMERGENCY)
Facility: HOSPITAL | Age: 24
Discharge: HOME | End: 2024-07-18
Attending: EMERGENCY MEDICINE
Payer: COMMERCIAL

## 2024-07-18 VITALS
DIASTOLIC BLOOD PRESSURE: 63 MMHG | TEMPERATURE: 98.3 F | OXYGEN SATURATION: 100 % | HEART RATE: 63 BPM | RESPIRATION RATE: 18 BRPM | SYSTOLIC BLOOD PRESSURE: 100 MMHG

## 2024-07-18 DIAGNOSIS — G43.909 MIGRAINE: Primary | ICD-10-CM

## 2024-07-18 LAB
ALBUMIN SERPL BCG-MCNC: 4.8 G/DL (ref 3.5–5)
ALP SERPL-CCNC: 47 U/L (ref 34–104)
ALT SERPL W P-5'-P-CCNC: 10 U/L (ref 7–52)
ANION GAP SERPL CALCULATED.3IONS-SCNC: 8 MMOL/L (ref 4–13)
AST SERPL W P-5'-P-CCNC: 16 U/L (ref 13–39)
BASOPHILS # BLD AUTO: 0.06 THOUSANDS/ÂΜL (ref 0–0.1)
BASOPHILS NFR BLD AUTO: 1 % (ref 0–1)
BILIRUB SERPL-MCNC: 0.55 MG/DL (ref 0.2–1)
BUN SERPL-MCNC: 16 MG/DL (ref 5–25)
CALCIUM SERPL-MCNC: 9.5 MG/DL (ref 8.4–10.2)
CHLORIDE SERPL-SCNC: 108 MMOL/L (ref 96–108)
CO2 SERPL-SCNC: 22 MMOL/L (ref 21–32)
CREAT SERPL-MCNC: 0.74 MG/DL (ref 0.6–1.3)
EOSINOPHIL # BLD AUTO: 0.07 THOUSAND/ÂΜL (ref 0–0.61)
EOSINOPHIL NFR BLD AUTO: 1 % (ref 0–6)
ERYTHROCYTE [DISTWIDTH] IN BLOOD BY AUTOMATED COUNT: 12.6 % (ref 11.6–15.1)
GFR SERPL CREATININE-BSD FRML MDRD: 115 ML/MIN/1.73SQ M
GLUCOSE SERPL-MCNC: 87 MG/DL (ref 65–140)
HCT VFR BLD AUTO: 40.9 % (ref 34.8–46.1)
HGB BLD-MCNC: 13.8 G/DL (ref 11.5–15.4)
IMM GRANULOCYTES # BLD AUTO: 0.01 THOUSAND/UL (ref 0–0.2)
IMM GRANULOCYTES NFR BLD AUTO: 0 % (ref 0–2)
LYMPHOCYTES # BLD AUTO: 1.85 THOUSANDS/ÂΜL (ref 0.6–4.47)
LYMPHOCYTES NFR BLD AUTO: 33 % (ref 14–44)
MCH RBC QN AUTO: 29.6 PG (ref 26.8–34.3)
MCHC RBC AUTO-ENTMCNC: 33.7 G/DL (ref 31.4–37.4)
MCV RBC AUTO: 88 FL (ref 82–98)
MONOCYTES # BLD AUTO: 0.47 THOUSAND/ÂΜL (ref 0.17–1.22)
MONOCYTES NFR BLD AUTO: 8 % (ref 4–12)
NEUTROPHILS # BLD AUTO: 3.24 THOUSANDS/ÂΜL (ref 1.85–7.62)
NEUTS SEG NFR BLD AUTO: 57 % (ref 43–75)
NRBC BLD AUTO-RTO: 0 /100 WBCS
PLATELET # BLD AUTO: 231 THOUSANDS/UL (ref 149–390)
PMV BLD AUTO: 9.4 FL (ref 8.9–12.7)
POTASSIUM SERPL-SCNC: 3.8 MMOL/L (ref 3.5–5.3)
PROT SERPL-MCNC: 7.6 G/DL (ref 6.4–8.4)
RBC # BLD AUTO: 4.66 MILLION/UL (ref 3.81–5.12)
SODIUM SERPL-SCNC: 138 MMOL/L (ref 135–147)
WBC # BLD AUTO: 5.7 THOUSAND/UL (ref 4.31–10.16)

## 2024-07-18 PROCEDURE — 96361 HYDRATE IV INFUSION ADD-ON: CPT

## 2024-07-18 PROCEDURE — 96365 THER/PROPH/DIAG IV INF INIT: CPT

## 2024-07-18 PROCEDURE — 99283 EMERGENCY DEPT VISIT LOW MDM: CPT

## 2024-07-18 PROCEDURE — 96375 TX/PRO/DX INJ NEW DRUG ADDON: CPT

## 2024-07-18 PROCEDURE — 36415 COLL VENOUS BLD VENIPUNCTURE: CPT

## 2024-07-18 PROCEDURE — 99284 EMERGENCY DEPT VISIT MOD MDM: CPT | Performed by: PHYSICIAN ASSISTANT

## 2024-07-18 PROCEDURE — 80053 COMPREHEN METABOLIC PANEL: CPT | Performed by: EMERGENCY MEDICINE

## 2024-07-18 PROCEDURE — 85025 COMPLETE CBC W/AUTO DIFF WBC: CPT | Performed by: EMERGENCY MEDICINE

## 2024-07-18 RX ORDER — MAGNESIUM SULFATE HEPTAHYDRATE 40 MG/ML
2 INJECTION, SOLUTION INTRAVENOUS ONCE
Status: COMPLETED | OUTPATIENT
Start: 2024-07-18 | End: 2024-07-18

## 2024-07-18 RX ORDER — DIPHENHYDRAMINE HYDROCHLORIDE 50 MG/ML
25 INJECTION INTRAMUSCULAR; INTRAVENOUS ONCE
Status: COMPLETED | OUTPATIENT
Start: 2024-07-18 | End: 2024-07-18

## 2024-07-18 RX ORDER — METOCLOPRAMIDE HYDROCHLORIDE 5 MG/ML
10 INJECTION INTRAMUSCULAR; INTRAVENOUS ONCE
Status: COMPLETED | OUTPATIENT
Start: 2024-07-18 | End: 2024-07-18

## 2024-07-18 RX ORDER — SUMATRIPTAN 6 MG/.5ML
6 INJECTION, SOLUTION SUBCUTANEOUS ONCE
Status: DISCONTINUED | OUTPATIENT
Start: 2024-07-18 | End: 2024-07-18

## 2024-07-18 RX ORDER — KETOROLAC TROMETHAMINE 30 MG/ML
30 INJECTION, SOLUTION INTRAMUSCULAR; INTRAVENOUS ONCE
Status: COMPLETED | OUTPATIENT
Start: 2024-07-18 | End: 2024-07-18

## 2024-07-18 RX ADMIN — KETOROLAC TROMETHAMINE 30 MG: 30 INJECTION, SOLUTION INTRAMUSCULAR; INTRAVENOUS at 15:02

## 2024-07-18 RX ADMIN — DIPHENHYDRAMINE HYDROCHLORIDE 25 MG: 50 INJECTION, SOLUTION INTRAMUSCULAR; INTRAVENOUS at 14:59

## 2024-07-18 RX ADMIN — SODIUM CHLORIDE 1000 ML: 0.9 INJECTION, SOLUTION INTRAVENOUS at 14:58

## 2024-07-18 RX ADMIN — MAGNESIUM SULFATE HEPTAHYDRATE 2 G: 2 INJECTION, SOLUTION INTRAVENOUS at 15:10

## 2024-07-18 RX ADMIN — METOCLOPRAMIDE 10 MG: 5 INJECTION, SOLUTION INTRAMUSCULAR; INTRAVENOUS at 15:05

## 2024-07-18 NOTE — ED PROVIDER NOTES
"History  Chief Complaint   Patient presents with    Migraine     Patient reports to ED c/o migraine that \"feels different than others\" for multiple weeks. Hx of migraine. Reports blurriness in right eye. Reports pain at base of skull radiates to bilateral shoulders and arms. Patient did not try rizatriptan that has previously been prescribed.      Becky is a 24-year-old female with significant past medical history of migraine with aura presenting to the ED today for atypical migraine x 1 week. She reports that the symptoms started approximately last Sunday. She describes the headache as a persistent, dull pressure over the forehead radiating to the occiput and bilateral shoulders.  The headache had an insidious onset that progressively worsened over the past week. She rates the pain at a 3 out of 10 right now. She reports alternating blurry vision between both eyes. She currently has some blurred vision on the right eye. She reports associated neck pain, photophobia, and intermittent numbness in fingertips. She reports that these symptoms wax and wane throughout the day. Her primary concern is that the symptoms have persisted for so long. She reports taking Tylenol and ibuprofen as needed for pain, two ibuprofen this morning.  She reports that this does improve the symptoms although the symptoms have continued. She was evaluated by neurology for migraine with aura in the past. She was prescribed rizatriptan although has not needed it. She reports that her typical migraine is a dull pressure across the forehead that also alternates between eyes. She denies any fevers, chills, persistent vision changes, nausea, vomiting, confusion or difficulty walking. She reports starting a new job which she attributes to worsening neck pain.No recent trauma or illness. She did not take her Maxalt because she states she does not like taking meds.       History provided by:  Patient  Migraine  Associated symptoms: headaches  "   Associated symptoms: no abdominal pain, no chest pain, no congestion, no cough, no fatigue, no fever, no nausea, no rash, no shortness of breath, no sore throat and no vomiting        Prior to Admission Medications   Prescriptions Last Dose Informant Patient Reported? Taking?   Cetirizine HCl (ZYRTEC ALLERGY PO)  Self Yes No   Sig: Take by mouth if needed   acetaminophen (TYLENOL) 325 mg tablet  Self Yes No   Sig: Take 650 mg by mouth every 6 (six) hours as needed for mild pain   desonide (DESOWEN) 0.05 % cream   No No   Sig: Apply topically 2 (two) times a day for 10 days   dicyclomine (BENTYL) 10 mg capsule  Self No No   Sig: TAKE 1 CAPSULE BY MOUTH 4 TIMES A DAY (BEFORE MEALS AND AT BEDTIME)   hydrocortisone 2.5 % ointment   No No   Sig: Apply on its own to upper and lower eyelids twice a day for 3 days only, then combine with Elidel and apply twice a day for 2 days only, then stop.   ibuprofen (MOTRIN) 400 mg tablet  Self Yes No   Sig: Take by mouth every 6 (six) hours as needed for mild pain   ketoconazole (NIZORAL) 2 % shampoo   No No   Sig: Use as shampoo at least 3 times per week to scalp. Lather into scalp and let sit for 5 minutes before rinsing. Can use as face wash.   omeprazole (PriLOSEC) 40 MG capsule   No No   Sig: TAKE 1 CAPSULE BY MOUTH EVERY DAY   pimecrolimus (ELIDEL) 1 % cream   No No   Sig: Combine with hydrocortisone 2.5% ointment and apply twice a day to upper and lower eyelids for 2 days only, then apply on its own twice a day for the next 5 days or until rash resolves. Then use as needed.   rizatriptan (MAXALT-MLT) 10 mg disintegrating tablet  Self No No   Sig: Take 1 tablet (10 mg total) by mouth once as needed for migraine for up to 1 dose May repeat in 2 hours if needed. 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    E-Cigarette/Vaping    E-Cigarette Use Never User      E-Cigarette/Vaping Substances    Nicotine No     THC No     CBD No     Flavoring No     Other No     Unknown No      Social History     Tobacco Use    Smoking status: Never    Smokeless tobacco: Never   Vaping Use    Vaping status: Never Used   Substance Use Topics    Alcohol use: Yes     Comment: social    Drug use: Never       Review of Systems   Constitutional:  Negative for chills, diaphoresis, fatigue and fever.   HENT:  Negative for congestion, sinus pressure and sore throat.    Respiratory:  Negative for cough and shortness of breath.    Cardiovascular:  Negative for chest pain and leg swelling.   Gastrointestinal:  Negative for abdominal pain, nausea and vomiting.   Genitourinary:  Negative for difficulty urinating, dysuria and hematuria.   Musculoskeletal:  Positive for neck pain and neck stiffness.   Skin:  Negative for color change and rash.   Neurological:  Positive for numbness and headaches. Negative for dizziness, facial asymmetry, speech difficulty, weakness and light-headedness.   Psychiatric/Behavioral:  Negative for confusion and sleep disturbance.    All other systems reviewed and are negative.      Physical Exam  Physical Exam  Vitals and nursing note reviewed.   Constitutional:       General: She is not in acute distress.     Appearance: Normal appearance. She is well-developed and well-groomed. She is not ill-appearing or diaphoretic.   HENT:      Head: Normocephalic and atraumatic.       Right Ear: Hearing normal.      Left Ear: Hearing normal.      Nose: Nose normal.      Mouth/Throat:      Mouth: Mucous membranes are moist.      Pharynx: Oropharynx is clear.   Eyes:      General: Vision grossly intact.      Extraocular Movements: Extraocular movements intact.      Right eye: Normal extraocular motion and no nystagmus.      Left eye: Normal extraocular motion and no nystagmus.      Conjunctiva/sclera: Conjunctivae normal.      Right eye: Right conjunctiva is not injected.      Left eye: Left conjunctiva is not injected.      Pupils: Pupils are equal, round, and reactive to light.   Cardiovascular:      Rate and Rhythm: Normal rate and regular rhythm.      Pulses: Normal pulses.      Heart sounds: Normal heart sounds. No murmur heard.     No friction rub. No gallop.   Pulmonary:      Effort: Pulmonary effort is normal.      Breath sounds: Normal breath sounds. No wheezing, rhonchi or rales.   Abdominal:      General: Abdomen is flat.      Palpations: Abdomen is soft.      Tenderness: There is no abdominal tenderness. There is no guarding or rebound.   Musculoskeletal:         General: Normal range of motion.      Cervical back: Normal range of motion and neck supple. No erythema or rigidity. Muscular tenderness present. No pain with movement. Normal range of motion.      Right lower leg: No edema.      Left lower leg: No edema.   Skin:     General: Skin is warm and dry.      Findings: No bruising, erythema or rash.   Neurological:      General: No focal deficit present.      Mental Status: She is alert and oriented to person, place, and time. Mental status is at baseline.      GCS: GCS eye subscore is 4. GCS verbal subscore is 5. GCS motor subscore is 6.      Cranial Nerves: No cranial nerve deficit.      Sensory: Sensation is intact. No sensory deficit.      Motor: Motor function is intact. No weakness or abnormal muscle tone.      Coordination: Coordination is intact. Coordination normal.  Finger-Nose-Finger Test and Heel to Miller Test normal.      Gait: Gait is intact.   Psychiatric:         Mood and Affect: Mood normal.         Speech: Speech normal.         Behavior: Behavior is cooperative.         Vital Signs  ED Triage Vitals   Temperature Pulse Respirations Blood Pressure SpO2   07/18/24 1315 07/18/24 1314 07/18/24 1314 07/18/24 1314 07/18/24 1314   98.3 °F (36.8 °C) 77 18 147/98 98 %      Temp Source Heart Rate Source Patient Position - Orthostatic VS BP Location FiO2 (%)   07/18/24 1315 07/18/24 1314 07/18/24 1314 07/18/24 1314 --   Oral Monitor Sitting Right arm       Pain Score       07/18/24 1314       2           Vitals:    07/18/24 1314 07/18/24 1523 07/18/24 1600 07/18/24 1630   BP: 147/98 106/62 91/60 100/63   Pulse: 77 55 67 63   Patient Position - Orthostatic VS: Sitting            Visual Acuity  Visual Acuity      Flowsheet Row Most Recent Value   L Pupil Size (mm) 2   R Pupil Size (mm) 2            ED Medications  Medications   ketorolac (TORADOL) injection 30 mg (30 mg Intravenous Given 7/18/24 1502)   metoclopramide (REGLAN) injection 10 mg (10 mg Intravenous Given 7/18/24 1505)   diphenhydrAMINE (BENADRYL) injection 25 mg (25 mg Intravenous Given 7/18/24 1459)   magnesium sulfate 2 g/50 mL IVPB (premix) 2 g (0 g Intravenous Stopped 7/18/24 1601)   sodium chloride 0.9 % bolus 1,000 mL (0 mL Intravenous Stopped 7/18/24 1633)       Diagnostic Studies  Results Reviewed       Procedure Component Value Units Date/Time    Comprehensive metabolic panel [[id number]] Collected: 07/18/24 1317    Lab Status: Final result Specimen: Blood from Arm, Right Updated: 07/18/24 1509     Sodium 138 mmol/L      Potassium 3.8 mmol/L      Chloride 108 mmol/L      CO2 22 mmol/L      ANION GAP 8 mmol/L      BUN 16 mg/dL      Creatinine 0.74 mg/dL      Glucose 87 mg/dL      Calcium 9.5 mg/dL      AST 16 U/L      ALT 10 U/L      Alkaline Phosphatase 47 U/L      Total Protein 7.6 g/dL      Albumin 4.8 g/dL       Total Bilirubin 0.55 mg/dL      eGFR 115 ml/min/1.73sq m     Narrative:      National Kidney Disease Foundation guidelines for Chronic Kidney Disease (CKD):     Stage 1 with normal or high GFR (GFR > 90 mL/min/1.73 square meters)    Stage 2 Mild CKD (GFR = 60-89 mL/min/1.73 square meters)    Stage 3A Moderate CKD (GFR = 45-59 mL/min/1.73 square meters)    Stage 3B Moderate CKD (GFR = 30-44 mL/min/1.73 square meters)    Stage 4 Severe CKD (GFR = 15-29 mL/min/1.73 square meters)    Stage 5 End Stage CKD (GFR <15 mL/min/1.73 square meters)  Note: GFR calculation is accurate only with a steady state creatinine    CBC and differential [[id number]] Collected: 07/18/24 1317    Lab Status: Final result Specimen: Blood from Arm, Right Updated: 07/18/24 1329     WBC 5.70 Thousand/uL      RBC 4.66 Million/uL      Hemoglobin 13.8 g/dL      Hematocrit 40.9 %      MCV 88 fL      MCH 29.6 pg      MCHC 33.7 g/dL      RDW 12.6 %      MPV 9.4 fL      Platelets 231 Thousands/uL      nRBC 0 /100 WBCs      Segmented % 57 %      Immature Grans % 0 %      Lymphocytes % 33 %      Monocytes % 8 %      Eosinophils Relative 1 %      Basophils Relative 1 %      Absolute Neutrophils 3.24 Thousands/µL      Absolute Immature Grans 0.01 Thousand/uL      Absolute Lymphocytes 1.85 Thousands/µL      Absolute Monocytes 0.47 Thousand/µL      Eosinophils Absolute 0.07 Thousand/µL      Basophils Absolute 0.06 Thousands/µL                    No orders to display              Procedures  Procedures         ED Course  ED Course as of 07/18/24 1646   Thu Jul 18, 2024   1611 Patient feeling better.                                  SBIRT 22yo+      Flowsheet Row Most Recent Value   Initial Alcohol Screen: US AUDIT-C     1. How often do you have a drink containing alcohol? 0 Filed at: 07/18/2024 1314   2. How many drinks containing alcohol do you have on a typical day you are drinking?  0 Filed at: 07/18/2024 1314   3a. Male UNDER 65: How often do you have  five or more drinks on one occasion? 0 Filed at: 07/18/2024 1314   3b. FEMALE Any Age, or MALE 65+: How often do you have 4 or more drinks on one occassion? 0 Filed at: 07/18/2024 1314   Audit-C Score 0 Filed at: 07/18/2024 1314   MICK: How many times in the past year have you...    Used an illegal drug or used a prescription medication for non-medical reasons? Never Filed at: 07/18/2024 1314                      Medical Decision Making  Patient with migraine headache, similar to prior, just lasting longer, normal neuro exam, no need for imaging.  Will give migraine cocktail and reassess.  Patient improved with medication.  Return precautions given.     Amount and/or Complexity of Data Reviewed  Labs: ordered.    Risk  Prescription drug management.                 Disposition  Final diagnoses:   Migraine     Time reflects when diagnosis was documented in both MDM as applicable and the Disposition within this note       Time User Action Codes Description Comment    7/18/2024  4:13 PM Celestina Giron Add [G43.909] Migraine           ED Disposition       ED Disposition   Discharge    Condition   Stable    Date/Time   Thu Jul 18, 2024 1613    Comment   Becky Hong discharge to home/self care.                   Follow-up Information       Follow up With Specialties Details Why Contact Info Additional Information    [facility], Nurse Practitioner Schedule an appointment as soon as possible for a visit in 2 days As needed, For recheck [address] [place] [address] [phone]        [facility] Emergency Department Emergency Medicine Go to  If symptoms worsen [address] [facility] [address] [phone] [facility] Emergency Department, [address] [place], [place] [address]            Discharge Medication List as of 7/18/2024  4:13 PM        CONTINUE these medications which have NOT  CHANGED    Details   acetaminophen (TYLENOL) 325 mg tablet Take 650 mg by mouth every 6 (six) hours as needed for mild pain, Historical Med      Cetirizine HCl (ZYRTEC ALLERGY PO) Take by mouth if needed, Historical Med      desonide (DESOWEN) 0.05 % cream Apply topically 2 (two) times a day for 10 days, Starting Fri 5/10/2024, Until Mon 5/20/2024, Normal      dicyclomine (BENTYL) 10 mg capsule TAKE 1 CAPSULE BY MOUTH 4 TIMES A DAY (BEFORE MEALS AND AT BEDTIME), Normal      hydrocortisone 2.5 % ointment Apply on its own to upper and lower eyelids twice a day for 3 days only, then combine with Elidel and apply twice a day for 2 days only, then stop., Normal      ibuprofen (MOTRIN) 400 mg tablet Take by mouth every 6 (six) hours as needed for mild pain, Historical Med      ketoconazole (NIZORAL) 2 % shampoo Use as shampoo at least 3 times per week to scalp. Lather into scalp and let sit for 5 minutes before rinsing. Can use as face wash., Normal      omeprazole (PriLOSEC) 40 MG capsule TAKE 1 CAPSULE BY MOUTH EVERY DAY, Starting Sun 6/23/2024, Normal      pimecrolimus (ELIDEL) 1 % cream Combine with hydrocortisone 2.5% ointment and apply twice a day to upper and lower eyelids for 2 days only, then apply on its own twice a day for the next 5 days or until rash resolves. Then use as needed., Normal      rizatriptan (MAXALT-MLT) 10 mg disintegrating tablet Take 1 tablet (10 mg total) by mouth once as needed for migraine for up to 1 dose May repeat in 2 hours if needed. Max 2 tablets in 24 hours, Starting Fri 1/6/2023, Normal             No discharge procedures on file.    PDMP Review       None            ED Provider  Electronically Signed by             Celestina Giron PA-C  07/18/24 0127

## 2024-07-18 NOTE — DISCHARGE INSTRUCTIONS
Rest, increase fluids.  Follow up with PCP in 2-3 days for recheck as needed.  Return to ER if symptoms worsen.

## 2024-07-18 NOTE — TELEPHONE ENCOUNTER
There is nothing I can do for her. Perhaps she should be seen in the ER due to the vision change.

## 2024-07-19 ENCOUNTER — TELEPHONE (OUTPATIENT)
Age: 24
End: 2024-07-19

## 2024-07-19 ENCOUNTER — OFFICE VISIT (OUTPATIENT)
Dept: FAMILY MEDICINE CLINIC | Facility: HOSPITAL | Age: 24
End: 2024-07-19
Payer: COMMERCIAL

## 2024-07-19 VITALS
SYSTOLIC BLOOD PRESSURE: 110 MMHG | HEART RATE: 65 BPM | TEMPERATURE: 97.8 F | WEIGHT: 125.8 LBS | DIASTOLIC BLOOD PRESSURE: 68 MMHG | BODY MASS INDEX: 19.74 KG/M2 | HEIGHT: 67 IN

## 2024-07-19 DIAGNOSIS — G43.009 MIGRAINE WITHOUT AURA AND WITHOUT STATUS MIGRAINOSUS, NOT INTRACTABLE: Primary | ICD-10-CM

## 2024-07-19 DIAGNOSIS — F41.1 GAD (GENERALIZED ANXIETY DISORDER): ICD-10-CM

## 2024-07-19 PROCEDURE — 99214 OFFICE O/P EST MOD 30 MIN: CPT | Performed by: NURSE PRACTITIONER

## 2024-07-19 NOTE — TELEPHONE ENCOUNTER
Contacted patient in regards to Routine Referral in attempts to verify patient's needs of services and add patient to proper wait list. LVM to contact [phone] in regards to routine referral. 1st attempt.

## 2024-07-19 NOTE — ASSESSMENT & PLAN NOTE
Intermittent anxiety flares becoming more interfering and she requests consult to start therapy - consult entered  Return with worse or persistent mood concerns

## 2024-07-19 NOTE — TELEPHONE ENCOUNTER
Patient has been added to the [facility] wait list with a referral.    Insurance: [employer] [place] personal choice   Insurance Type:    Commercial [x]   Medicaid []   [facility] (if applicable)   Medicare []  Location Preference: Anywhere  Provider Preference: none   Virtual: Yes [] No [x]  Were outside resources sent: Yes [x] No []    Presenting Problem  Anxiety   Stress

## 2024-07-19 NOTE — TELEPHONE ENCOUNTER
Patient called she was seen in office today, she tried to schedule MRI brain but central scheduling told her order  and needs new order to schedule.    Thank you

## 2024-07-19 NOTE — ASSESSMENT & PLAN NOTE
Acute migraine treated in ED yesterday is improving w/o red flag neuro sx's  Discussed option to continue OTC prn med vs rx med and she prefers to continue OTC at this time  Advise she continue adequate hydration, Mg & riboflavin supplements, and schedule brain MRI as previously ordered by neuro  F/U with  neuro if worse or persistent sx's

## 2024-07-19 NOTE — PROGRESS NOTES
"Ambulatory Visit  Name: Becky Hong      : 2000      MRN: [id number]  Encounter Provider: SHERI Watkins  Encounter Date: 2024   Encounter department: [facility] [id number]     Assessment & Plan   1. Migraine without aura and without status migrainosus, not intractable  Assessment & Plan:  Acute migraine treated in ED yesterday is improving w/o red flag neuro sx's  Discussed option to continue OTC prn med vs rx med and she prefers to continue OTC at this time  Advise she continue adequate hydration, Mg & riboflavin supplements, and schedule brain MRI as previously ordered by neuro  F/U with  neuro if worse or persistent sx's  2. POWER (generalized anxiety disorder)  Assessment & Plan:  Intermittent anxiety flares becoming more interfering and she requests consult to start therapy - consult entered  Return with worse or persistent mood concerns  Orders:  -     Ambulatory referral to Psych Services; Future         History of Present Illness     Has been getting migraines since  and had seen a neurologist. Hasn't seen the neurologist with migraines not being too bad recently. MRI ordered last year but hasn't been done yet.   Was seen in the ED yesterday with migraine and received migraine cocktail. Had migraine all week and it was \"shifting\" from left to right side. Felt eye strain in one eye. This HA was different having more tension in her neck and jaw. Has mild ongoing symptoms and took excedin migraine this morning. HA currently 2/10 and pain in neck is 3-4/10.   Under more stress with recently starting a new job.     Headache  Providers seen:  Neurologist    Review of Systems   Constitutional: Negative.    Eyes:  Positive for visual disturbance (with HA this week).   Respiratory: Negative.     Cardiovascular: Negative.    Musculoskeletal:  Positive for neck pain. Negative for neck stiffness.   Neurological:  Positive for headaches. Negative for dizziness, " syncope, facial asymmetry, speech difficulty, weakness and light-headedness.   Psychiatric/Behavioral:  The patient is nervous/anxious.      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    WISDOM TOOTH EXTRACTION       Family History   Problem Relation Age of Onset    Thyroid disease Mother     No Known Problems Father     Hypertension Maternal Grandmother     Hypertension Maternal Grandfather     Factor V Leiden deficiency Maternal Uncle     Factor V Leiden deficiency Paternal Uncle      Social History     Tobacco Use    Smoking status: Never    Smokeless tobacco: Never   Vaping Use    Vaping status: Never Used   Substance and Sexual Activity    Alcohol use: Yes     Comment: social    Drug use: Never    Sexual activity: Never     Current Outpatient Medications on File Prior to Visit   Medication Sig    Cetirizine HCl (ZYRTEC ALLERGY PO) Take by mouth if needed    ibuprofen (MOTRIN) 400 mg tablet Take by mouth every 6 (six) hours as needed for mild pain    ketoconazole (NIZORAL) 2 % shampoo Use as shampoo at least 3 times per week to scalp. Lather into scalp and let sit for 5 minutes before rinsing. Can use as face wash.    omeprazole (PriLOSEC) 40 MG capsule TAKE 1 CAPSULE BY MOUTH EVERY DAY    pimecrolimus (ELIDEL) 1 % cream Combine with hydrocortisone 2.5% ointment and apply twice a day to upper and lower eyelids for 2 days only, then apply on its own twice a day for the next 5 days or until rash resolves. Then use as needed.    acetaminophen (TYLENOL) 325 mg tablet Take 650 mg by mouth every 6 (six) hours as needed for mild pain    desonide (DESOWEN) 0.05 % cream Apply topically 2 (two) times a day for 10 days    dicyclomine (BENTYL) 10 mg capsule TAKE 1 CAPSULE BY MOUTH 4 TIMES A DAY (BEFORE MEALS AND AT BEDTIME) (Patient not taking:  "Reported on 7/19/2024)    hydrocortisone 2.5 % ointment Apply on its own to upper and lower eyelids twice a day for 3 days only, then combine with Elidel and apply twice a day for 2 days only, then stop.    rizatriptan (MAXALT-MLT) 10 mg disintegrating tablet Take 1 tablet (10 mg total) by mouth once as needed for migraine for up to 1 dose May repeat in 2 hours if needed. Max 2 tablets in 24 hours     No Known Allergies  Immunization History   Administered Date(s) Administered    COVID-19 PFIZER VACCINE 0.3 ML IM 04/14/2021, 05/06/2021, 12/20/2021    COVID-19 Pfizer Vac BIVALENT Steve-sucrose 12 Yr+ IM 12/29/2022    COVID-19 Pfizer mRNA vacc PF steve-sucrose 12 yr and older (Comirnaty) 01/03/2024    DTaP,unspecified 2002, 2002, 2002, 06/11/2003, 08/30/2006    H1N1, All Formulations 01/11/2010    HPV9 06/12/2020, 08/12/2020, 12/21/2020    Hep A, ped/adol, 2 dose 07/07/2008, 08/20/2009    Hep B, Adolescent or Pediatric 2002, 2002, 04/02/2003    HiB 2002, 2002, 2002, 06/11/2003    INFLUENZA 11/19/2018, 12/16/2021, 10/12/2022, 10/23/2023    IPV 2002, 2002, 06/11/2003, 08/30/2006    Influenza, injectable, quadrivalent, preservative free 0.5 mL 11/19/2018    MMR 04/02/2003, 03/22/2007    Meningococcal B, Recombinant (TRUMENBA) 08/13/2018, 07/09/2019    Meningococcal MCV4, Unspecified 08/13/2018    Meningococcal MCV4P 08/13/2018    Pneumococcal Conjugate 13-Valent 2002, 2002, 2002, 06/11/2003    Tdap 07/03/2012, 07/13/2022    Varicella 04/02/2003     Objective     /68   Pulse 65   Temp 97.8 °F (36.6 °C)   Ht 5' 7\" (1.702 m)   Wt 57.1 kg (125 lb 12.8 oz)   LMP 07/12/2024   BMI 19.70 kg/m²       Physical Exam  Vitals reviewed.   Constitutional:       General: She is not in acute distress.     Appearance: Normal appearance.   HENT:      Head: Normocephalic.   Eyes:      General: No scleral icterus.        Right eye: No discharge.         " Left eye: No discharge.      Extraocular Movements: Extraocular movements intact.      Pupils: Pupils are equal, round, and reactive to light.   Neck:      Thyroid: No thyromegaly.   Cardiovascular:      Rate and Rhythm: Normal rate and regular rhythm.      Heart sounds: No murmur heard.  Pulmonary:      Effort: Pulmonary effort is normal. No respiratory distress.      Breath sounds: Normal breath sounds.   Musculoskeletal:      Cervical back: Normal range of motion.   Lymphadenopathy:      Cervical: No cervical adenopathy.   Skin:     General: Skin is warm and dry.   Neurological:      General: No focal deficit present.      Mental Status: She is alert and oriented to person, place, and time.      Cranial Nerves: Cranial nerves 2-12 are intact. No cranial nerve deficit.      Motor: No weakness.      Coordination: Coordination is intact. Romberg sign negative. Finger-Nose-Finger Test normal.      Gait: Gait is intact. Gait normal.   Psychiatric:         Mood and Affect: Mood normal.         Behavior: Behavior normal.         Thought Content: Thought content normal.         Judgment: Judgment normal.         Administrative Statements   I have spent a total time of 20 minutes in caring for this patient on the day of the visit/encounter including Risks and benefits of tx options, Instructions for management, Patient and family education, Impressions, Counseling / Coordination of care, Documenting in the medical record, Reviewing / ordering tests, medicine, procedures  , and Obtaining or reviewing history  .

## 2024-07-25 ENCOUNTER — TELEPHONE (OUTPATIENT)
Age: 24
End: 2024-07-25

## 2024-07-25 NOTE — TELEPHONE ENCOUNTER
Patient used her Elidel cream last night and woke up to the effected area feeling warm. No irritation, no burning, and no swelling. Advised that this is normal. If symptoms worsen, patient was advised to call back.

## 2024-07-29 ENCOUNTER — HOSPITAL ENCOUNTER (OUTPATIENT)
Dept: RADIOLOGY | Facility: HOSPITAL | Age: 24
Discharge: HOME | End: 2024-07-29
Payer: COMMERCIAL

## 2024-07-29 DIAGNOSIS — G43.009 MIGRAINE WITHOUT AURA AND WITHOUT STATUS MIGRAINOSUS, NOT INTRACTABLE: ICD-10-CM

## 2024-07-29 PROCEDURE — 70551 MRI BRAIN STEM W/O DYE: CPT

## 2024-08-01 ENCOUNTER — TELEPHONE (OUTPATIENT)
Age: 24
End: 2024-08-01

## 2024-08-01 NOTE — TELEPHONE ENCOUNTER
pt called and states that she just got an MRI and results are in.  this was ordered by pcp    States that mri was ordered by dr duncan last year but she never got this done and then pcp ordered MRI as a couple weeks ago she had a bad migraine and spoke to her PCP and then they ordered MRI  she wanted to see if you needed to look at the results as you originally order MRI last year    States that she Still gets headaches here and there   I scheduled pt to see you on 8/9 as she was last seen may 2023    Please advise  [phone] to leave detailed message or [facility]

## 2024-08-01 NOTE — TELEPHONE ENCOUNTER
I will discuss results with her at her follow-up next week. Will likely need repeat imaging with contrast. Nothing to do emergently at this time

## 2024-08-02 ENCOUNTER — OFFICE VISIT (OUTPATIENT)
Dept: NEUROLOGY | Facility: CLINIC | Age: 24
End: 2024-08-02
Payer: COMMERCIAL

## 2024-08-02 VITALS
SYSTOLIC BLOOD PRESSURE: 122 MMHG | DIASTOLIC BLOOD PRESSURE: 60 MMHG | BODY MASS INDEX: 19.38 KG/M2 | TEMPERATURE: 98.7 F | WEIGHT: 123.5 LBS | OXYGEN SATURATION: 96 % | HEART RATE: 58 BPM | HEIGHT: 67 IN

## 2024-08-02 DIAGNOSIS — M54.2 CERVICALGIA: ICD-10-CM

## 2024-08-02 DIAGNOSIS — G43.109 MIGRAINE WITH AURA AND WITHOUT STATUS MIGRAINOSUS, NOT INTRACTABLE: Primary | ICD-10-CM

## 2024-08-02 DIAGNOSIS — D18.00 CAVERNOMA: ICD-10-CM

## 2024-08-02 DIAGNOSIS — G47.9 SLEEP DIFFICULTIES: ICD-10-CM

## 2024-08-02 DIAGNOSIS — F41.9 ANXIETY DISORDER: ICD-10-CM

## 2024-08-02 PROCEDURE — 99215 OFFICE O/P EST HI 40 MIN: CPT | Performed by: STUDENT IN AN ORGANIZED HEALTH CARE EDUCATION/TRAINING PROGRAM

## 2024-08-02 RX ORDER — RIZATRIPTAN BENZOATE 10 MG/1
10 TABLET ORAL AS NEEDED
Qty: 10 TABLET | Refills: 6 | Status: SHIPPED | OUTPATIENT
Start: 2024-08-02

## 2024-08-02 NOTE — PATIENT INSTRUCTIONS
Additional Testing  MRI brain with contrast    Referral  Physical therapy    Headache Calendar  Please maintain a headache calendar  Consider using phone applications such as Migraine [place] or [place] Migraine Tracker     Headache/migraine treatment:   Acute medications (for immediate treatment of a headache):   It is ok to take ibuprofen, acetaminophen or naproxen (Advil, Tylenol,  Aleve, Excedrin) if they help your headaches you should limit these to No more than 2-3 times a week to avoid medication overuse/rebound headaches.      For your more moderate to severe migraines take this medication early  Maxalt (rizatriptan) 10mg tabs - take one at the onset of headache. May repeat one time after 2 hours if pain has not resolved.   (Max 2 a day and 10 a month)      Over the counter preventive supplements for headaches/migraines  [x] Magnesium 400mg daily (If any diarrhea or upset stomach, decrease dose  as tolerated)- oxide or glycinate  [x] Riboflavin (Vitamin B2) 400mg daily - (FYI B2 may make your urine bright/neon yellow)     Lifestyle Recommendations:  [x] SLEEP - Maintain a regular sleep schedule: Adults need at least 7-8 hours of uninterrupted a night. Maintain good sleep hygiene:  Going to bed and waking up at consistent times, avoiding excessive daytime naps, avoiding caffeinated beverages in the evening, avoid excessive stimulation in the evening and generally using bed primarily for sleeping.  One hour before bedtime would recommend turning lights down lower, decreasing your activity (may read quietly, listen to music at a low volume). When you get into bed, should eliminate all technology (no texting, emailing, playing with your phone, iPad or tablet in bed).  [x] HYDRATION - Maintain good hydration.  Drink  2L of fluid a day (4 typical small water bottles)  [x] DIET - Maintain good nutrition. In particular don't skip meals and try and eat healthy balanced meals regularly.  [x] TRIGGERS - Look for other  triggers and avoid them: Limit caffeine to 1-2 cups a day or less. Avoid dietary triggers that you have noticed bring on your headaches (this could include aged cheese, peanuts, MSG, aspartame and nitrates).  [x] EXERCISE - physical exercise as we all know is good for you in many ways, and not only is good for your heart, but also is beneficial for your mental health, cognitive health and  chronic pain/headaches. I would encourage at the least 5 days of physical exercise weekly for at least 30 minutes.      Education and Follow-up  [x] Please call with any questions or concerns. Of course if any new concerning symptoms go to the emergency department.  [x] Follow up in 6 months

## 2024-08-02 NOTE — PROGRESS NOTES
Since your last visit are your headaches Worsened in the past couple months    Any change to the headache type?    What is your current headache frequency: 1-2 times per month; but more frequent in past few months     Are you taking your current medications as prescribed? yes      Do you have any side effects? no    How may days per week do you take an abortive medicine? 1-2/7     Review of Systems   Constitutional:  Negative for appetite change, fatigue and fever.   HENT: Negative.  Negative for hearing loss, tinnitus, trouble swallowing and voice change.    Eyes:  Positive for visual disturbance (blurry vision in right eye). Negative for photophobia and pain.   Respiratory: Negative.  Negative for shortness of breath.    Cardiovascular: Negative.  Negative for palpitations.   Gastrointestinal: Negative.  Negative for nausea and vomiting.   Endocrine: Negative.  Negative for cold intolerance.   Genitourinary: Negative.  Negative for dysuria, frequency and urgency.   Musculoskeletal:  Positive for neck pain. Negative for back pain, gait problem, myalgias and neck stiffness.   Skin: Negative.  Negative for rash.   Allergic/Immunologic: Negative.    Neurological:  Positive for numbness (tingling sensation b/l in arms and hands) and headaches. Negative for dizziness, tremors, seizures, syncope, facial asymmetry, speech difficulty, weakness and light-headedness.   Hematological: Negative.  Does not bruise/bleed easily.   Psychiatric/Behavioral: Negative.  Negative for confusion, hallucinations and sleep disturbance.    All other systems reviewed and are negative.

## 2024-08-02 NOTE — PROGRESS NOTES
[facility] Neurology Concussion/Headache Center Consult - Follow up   PATIENT:  Becky Hong  MRN:  [id number]  :  2000  DATE OF SERVICE:  2024  REFERRED BY: No ref. provider found  PMD: SHERI Barnett    Assessment/Plan:   Becky Hong is a delightful 24 y.o. female with a past medical history that includes GERD, POWER here for f/u evaluation of headache.     At today's visit, she reports that she was doing fairly well which is why she has not seen me in over a year.  Over the last few months, her headaches have flared up in terms of frequency and intensity, which she attributes possibly to starting a new job as a .  She continues with magnesium and B2 supplements daily, but due to a cervicalgia component I have recommended that she see physical therapy.  From an abortive standpoint, she will continue with rizatriptan although she has not needed to use it just yet.  She recently underwent an MRI of the brain that showed a possible cavernoma and radiology suggested a follow-up MRI with contrast which I have ordered.  I will likely put in a referral to neurosurgery once this repeat imaging is completed.  The risks associated with the suspected cavernoma were discussed.  If she were to develop a sudden onset headache with any new focal neurological symptoms, she has been instructed to go to the ER immediately.    Workup:  - Neurologic assessment reveals unremarkable neurological exam.  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 (I have personally reviewed imaging and radiology read)  - MRI brain with contrast  - PT referral     Preventative:  - we discussed headache hygiene and lifestyle factors that may improve headaches  - Mg and B2 supplements  - Currently on  through other providers: None  - Past/ failed/contraindicated: Mg (did not like how it made her feel, but willing to try again), avoid anti-hypertensive's due to baseline low vitals, failed Venlafaxine (side effects)  - future options: TCA, CGRP med, botox     Acute:  - discussed not taking over-the-counter or prescription pain medications more than 3 days per week to prevent medication overuse/rebound headache  - Maxalt 10mg  - Currently on through other providers: None  - Past/ failed/contraindicated: None  - future options:  Triptan, prochlorperazine, Toradol IM or p.o., could consider trial of 5 days of Depakote 500 mg nightly or dexamethasone 2 mg daily for prolonged migraine, ubrelvy, reyvow, nurtec, zavzpret  Patient instructions   Additional Testing  MRI brain with contrast    Referral  Physical therapy    Headache Calendar  Please maintain a headache calendar  Consider using phone applications such as Migraine [place] or [email or web address] Migraine Tracker     Headache/migraine treatment:   Acute medications (for immediate treatment of a headache):   It is ok to take ibuprofen, acetaminophen or naproxen (Advil, Tylenol,  Aleve, Excedrin) if they help your headaches you should limit these to No more than 2-3 times a week to avoid medication overuse/rebound headaches.      For your more moderate to severe migraines take this medication early  Maxalt (rizatriptan) 10mg tabs - take one at the onset of headache. May repeat one time after 2 hours if pain has not resolved.   (Max 2 a day and 10 a month)      Over the counter preventive supplements for headaches/migraines  [x] Magnesium 400mg daily (If any diarrhea or upset stomach, decrease dose  as tolerated)- oxide or glycinate  [x] Riboflavin (Vitamin B2) 400mg daily - (FYI B2 may make your urine bright/neon yellow)     Lifestyle Recommendations:  [x] SLEEP - Maintain a regular sleep schedule: Adults need at least 7-8 hours of uninterrupted a night. Maintain good sleep  hygiene:  Going to bed and waking up at consistent times, avoiding excessive daytime naps, avoiding caffeinated beverages in the evening, avoid excessive stimulation in the evening and generally using bed primarily for sleeping.  One hour before bedtime would recommend turning lights down lower, decreasing your activity (may read quietly, listen to music at a low volume). When you get into bed, should eliminate all technology (no texting, emailing, playing with your phone, iPad or tablet in bed).  [x] HYDRATION - Maintain good hydration.  Drink  2L of fluid a day (4 typical small water bottles)  [x] DIET - Maintain good nutrition. In particular don't skip meals and try and eat healthy balanced meals regularly.  [x] TRIGGERS - Look for other triggers and avoid them: Limit caffeine to 1-2 cups a day or less. Avoid dietary triggers that you have noticed bring on your headaches (this could include aged cheese, peanuts, MSG, aspartame and nitrates).  [x] EXERCISE - physical exercise as we all know is good for you in many ways, and not only is good for your heart, but also is beneficial for your mental health, cognitive health and  chronic pain/headaches. I would encourage at the least 5 days of physical exercise weekly for at least 30 minutes.      Education and Follow-up  [x] Please call with any questions or concerns. Of course if any new concerning symptoms go to the emergency department.  [x] Follow up in 6 months  Subjective:   24: Since last visit, she was having 1-2 migraines with aura per month while she was in school in the  of . But 3 weeks ago, she had a week of severe migraines. Advil, Tylenol, staying in dark room were not effective. Did not take Rizatriptan due to .Started L side of face and radiated to occipital area, and throughout the week, progressed to right side of face and then the rest of the head. During this time, experienced blurry vision in R eye. She called PCP office for  advice and went to ER. Migraine cocktail significantly helped. Since then, no major headache. PCP scheduled MRI without contrast. Since that weeklong migraine, no severe headaches. But does have mild tension in bilateral posterior neck and radiating down shoulders; this started since the weeklong severe migraine 3 weeks ago and has not abated. Has been taking Mg 400mg daily and Vitamin B2 400mg daily. Sometimes does have a dull headache over the R eye. Just graduated school and started a new job as a . Drinks at least 68oz of water daily. Has been having a lot of trouble falling asleep. Sleeps 5-6 hours a night, sometimes good quality. Does wake up at 4:30-5am and then fall back asleep, and then wakes up very tired. Uses heating pad at night, which helps. Hasn't had an aura.    Previous History:  5/12/23: Since her last visit, she feels that her headaches have improved.  At most, currently experiencing about 1-2 headache days per week.  She has not taken rizatriptan as she is afraid of the potential of side effects. Currently taking 400mg Mg and 200mg B2. She had a couple episodes since her last visit where she had a visual aura prior to the onset of her migraine. Both happened while she was in class and she did not have abortive medication with her.  1/11/23: Since her last visit, she tried taking Effexor but did not tolerate it.  Needed to go to the ER for evaluation and was given IV fluids, Ativan and Toradol for her headache. Reports that she felt very emotional while taking the Venlafaxine for just a couple days.   1/6/23: Ms. Hong reports a history of headaches that started around July 2022.  The description of her headaches sound migrainous and she has a strong family history suggestive of migraines as well.  I believe there are multiple contributing factors to her headaches including the genetic component, stress, anxiety and sleep difficulties.  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  We discussed potential treatment options, but thought that venlafaxine would be a good choice given her difficulties with sleep and mood.  From an abortive standpoint, I have encouraged her to decrease her use of Tylenol throughout the week and I will prescribe Maxalt for abortive management.  At this time based on a normal neurological exam and no significant red flags, I do not think brain imaging is warranted.  If we have difficulty treating her headaches going forward, I will obtain an MRI of the brain for further evaluation.   Past Medical History:     Past Medical History:   Diagnosis Date    GERD (gastroesophageal reflux disease)        Patient Active Problem List   Diagnosis    POWER (generalized anxiety disorder)    GERD (gastroesophageal reflux disease)    Migraine without aura    Family history of factor V Leiden mutation       Medications:      Current Outpatient Medications   Medication Sig Dispense Refill    acetaminophen (TYLENOL) 325 mg tablet Take 650 mg by mouth every 6 (six) hours as needed for mild pain      Cetirizine HCl (ZYRTEC ALLERGY PO) Take by mouth if needed      hydrocortisone 2.5 % ointment Apply on its own to upper and lower eyelids twice a day for 3 days only, then combine with Elidel and apply twice a day for 2 days only, then stop. 30 g 1    ibuprofen (MOTRIN) 400 mg tablet Take by mouth every 6 (six) hours as needed for mild pain      ketoconazole (NIZORAL) 2 % shampoo Use as shampoo at least 3 times per week to scalp. Lather into scalp and let sit for 5 minutes before rinsing. Can use as face wash. 100 mL 10    omeprazole (PriLOSEC) 40 MG capsule TAKE 1 CAPSULE BY MOUTH EVERY DAY 90 capsule 1    pimecrolimus (ELIDEL) 1 % cream Combine with hydrocortisone 2.5% ointment and apply twice a day to upper and lower eyelids for 2 days only, then apply on its own twice a day for the next 5 days or until rash resolves. Then use as needed. 30 g 2    rizatriptan  (MAXALT-MLT) 10 mg disintegrating tablet Take 1 tablet (10 mg total) by mouth once as needed for migraine for up to 1 dose May repeat in 2 hours if needed. Max 2 tablets in 24 hours 10 tablet 3    desonide (DESOWEN) 0.05 % cream Apply topically 2 (two) times a day for 10 days 15 g 1    dicyclomine (BENTYL) 10 mg capsule TAKE 1 CAPSULE BY MOUTH 4 TIMES A DAY (BEFORE MEALS AND AT BEDTIME) (Patient not taking: Reported on 7/19/2024) 360 capsule 1     No current facility-administered medications for this visit.        Allergies:    No Known Allergies    Family History:     Family History   Problem Relation Age of Onset    Thyroid disease Mother     No Known Problems Father     Hypertension Maternal Grandmother     Hypertension Maternal Grandfather     Factor V Leiden deficiency Maternal Uncle     Factor V Leiden deficiency Paternal Uncle        Social History:     Social History     Socioeconomic History    Marital status: Single     Spouse name: Not on file    Number of children: Not on file    Years of education: Not on file    Highest education level: Not on file   Occupational History    Not on file   Tobacco Use    Smoking status: Never    Smokeless tobacco: Never   Vaping Use    Vaping status: Never Used   Substance and Sexual Activity    Alcohol use: Yes     Comment: social    Drug use: Never    Sexual activity: Never   Other Topics Concern    Not on file   Social History Narrative    Not on file     Social Determinants of Health     Financial Resource Strain: Not on file   Food Insecurity: Not on file   Transportation Needs: Not on file   Physical Activity: Not on file   Stress: Not on file   Social Connections: Unknown (6/18/2024)    Received from [facility]     How often do you feel lonely or isolated from those around you? (Adult - for ages 18 years and over): Not on file   Intimate Partner Violence: Not on file   Housing Stability: Not on file         Objective:   Physical Exam:           "                                                     Vitals:            Constitutional:  /60 (BP Location: Right arm, Patient Position: Sitting, Cuff Size: Standard)   Pulse 58   Temp 98.7 °F (37.1 °C) (Temporal)   Ht 5' 7\" (1.702 m)   Wt 56 kg (123 lb 8 oz)   LMP 07/12/2024   SpO2 96%   BMI 19.34 kg/m²   BP Readings from Last 3 Encounters:   08/02/24 122/60   07/19/24 110/68   07/18/24 100/63     Pulse Readings from Last 3 Encounters:   08/02/24 58   07/19/24 65   07/18/24 63         Well developed, well nourished, well groomed. No dysmorphic features.       HEENT:  Normocephalic atraumatic. See neuro exam   Chest:  Respirations appear regular and unlabored.    Cardiovascular:  no observed significant swelling.    Musculoskeletal:  (see below under neurologic exam for evaluation of motor function and gait)   Skin:  warm and dry, not diaphoretic.    Psychiatric:  Normal behavior and appropriate affect       Neurological Examination:     Mental status/cognitive function:   Recent and remote memory intact. Attention span and concentration as well as fund of knowledge are appropriate for age. Normal language and spontaneous speech.  Cranial Nerves:  III, IV, VI-Pupils were equal, round. Extraocular movements were full and conjugate   VII-facial expression symmetric  VIII-hearing grossly intact bilaterally   Motor Exam: symmetric bulk throughout. no atrophy, fasciculations or abnormal movements noted.   Coordination:  no apparent dysmetria, ataxia or tremor noted  Gait: steady casual gait  Review of Systems:   Constitutional:  Negative for appetite change, fatigue and fever.   HENT: Negative.  Negative for hearing loss, tinnitus, trouble swallowing and voice change.    Eyes:  Positive for visual disturbance (blurry vision in right eye). Negative for photophobia and pain.   Respiratory: Negative.  Negative for shortness of breath.    Cardiovascular: Negative.  Negative for palpitations.   Gastrointestinal: " Negative.  Negative for nausea and vomiting.   Endocrine: Negative.  Negative for cold intolerance.   Genitourinary: Negative.  Negative for dysuria, frequency and urgency.   Musculoskeletal:  Positive for neck pain. Negative for back pain, gait problem, myalgias and neck stiffness.   Skin: Negative.  Negative for rash.   Allergic/Immunologic: Negative.    Neurological:  Positive for numbness (tingling sensation b/l in arms and hands) and headaches. Negative for dizziness, tremors, seizures, syncope, facial asymmetry, speech difficulty, weakness and light-headedness.   Hematological: Negative.  Does not bruise/bleed easily.   Psychiatric/Behavioral: Negative.  Negative for confusion, hallucinations and sleep disturbance.    All other systems reviewed and are negative.    I have spent 25 minutes with Patient  today in which greater than 50% of this time was spent in counseling/coordination of care regarding Diagnostic results, Prognosis, Risks and benefits of tx options, Patient and family education, Importance of tx compliance, Impressions, Documenting in the medical record, Reviewing / ordering tests, medicine, procedures  , and Obtaining or reviewing history  . I also spent 15 minutes non face to face for this patient the same day.     Activity Minutes   Precharting/reviewing 10   Patient care/counseling 25   Postcharting/care coordination 5       Author:  Teddy Valadez DO 8/2/2024 12:01 PM

## 2024-08-13 ENCOUNTER — TELEPHONE (OUTPATIENT)
Age: 24
End: 2024-08-13

## 2024-08-13 NOTE — TELEPHONE ENCOUNTER
Patient calling regarding MRI letter received in mail having to folloe up with Josué. Pt states followed up with Neurologist and reviewed results. Neurologist did order another MRI with Contrast for patient to have done. Pt inquiring if in need to follow up with Josué regarding MRI results.      Please review and advise.  Thank you

## 2024-08-15 ENCOUNTER — TELEPHONE (OUTPATIENT)
Age: 24
End: 2024-08-15

## 2024-08-15 NOTE — TELEPHONE ENCOUNTER
Contacted patient off of Talk Therapy  to verify needs of services in attempts to offer patient an appointment. LVM for patient to contact intake dept  in regards to offer possible appointment at [phone] opt 3. 1st attempt.

## 2024-08-16 ENCOUNTER — TELEPHONE (OUTPATIENT)
Age: 24
End: 2024-08-16

## 2024-08-16 NOTE — TELEPHONE ENCOUNTER
"Behavioral Health Outpatient Intake Questions    Referred By   : PCP    Please advise interviewee that they need to answer all questions truthfully to allow for best care, and any misrepresentations of information may affect their ability to be seen at this clinic   => Was this discussed? Yes     If Minor Child (under age 18)    Who is/are the legal guardian(s) of the child?     Is there a custody agreement?      If \"YES\"- Custody orders must be obtained prior to scheduling the first appointment  In addition, Consent to Treatment must be signed by all legal guardians prior to scheduling the first appointment    If \"NO\"- Consent to Treatment must be signed by all legal guardians prior to scheduling the first appointment    Behavioral Health Outpatient Intake History -     Presenting Problem (in patient's own words): Patient states she is dealing with stress and anxiety where its getting debilitating.     Are there any communication barriers for this patient?     No                                               If yes, please describe barriers:   If there is a unique situation, please refer to Tristan Hernandez/Betsy Braga for final determination.    Are you taking any psychiatric medications? No     If \"YES\" -What are they      If \"YES\" -Who prescribes?     Has the Patient previously received outpatient Talk Therapy or Medication Management from [facility]  No        If \"YES\"- When, Where and with Whom?         If \"NO\" -Has Patient received these services elsewhere?       If \"YES\" -When, Where, and with Whom? Patient received talk therapy at [facility] short term    Has the Patient abused alcohol or other substances in the last 6 months ? No       If \"YES\" -What substance, How much, How often?     If illegal substance: Refer to [facility] (for RANJIT) or [facility] Off[facility].   If Alcohol in excess of 10 drinks per week:  Refer to [facility] (for RANJIT) or [facility] Offices    Legal History-     Is this treatment court " "ordered? No   If \"yes \"send to :  Talk Therapy : Send to Tristan Hernandez/Betsy Braga for final determination   Med Management: Send to Dr Griffiths for final determination     Has the Patient been convicted of a felony?  NO   If \"Yes\" send to -When, What?  Talk Therapy: Send to Tristan Hernandez/Betsy Braga for final determination   Med Management: Send to Dr Griffiths for final determination     ACCEPTED as a patient Yes  If \"Yes\" Appointment Date: NO available appointments patient will call back in September to see if any appts becomes available    Referred Elsewhere? No  If “Yes” - (Where? Ex: [facility], [facility], [facility], [facility], etc.)       Name of Insurance Co:Personal choice  Insurance ID#[id number]   Insurance Phone #[phone]  If ins is primary or secondary?Primary   If patient is a minor, parents information such as Name, D.O.B of guarantor.  "

## 2024-08-22 ENCOUNTER — EVALUATION (OUTPATIENT)
Dept: PHYSICAL THERAPY | Facility: OTHER | Age: 24
End: 2024-08-22
Payer: COMMERCIAL

## 2024-08-22 DIAGNOSIS — M54.2 CERVICALGIA: Primary | ICD-10-CM

## 2024-08-22 PROCEDURE — 97161 PT EVAL LOW COMPLEX 20 MIN: CPT

## 2024-08-22 PROCEDURE — 97140 MANUAL THERAPY 1/> REGIONS: CPT

## 2024-08-22 NOTE — PROGRESS NOTES
"PT Evaluation     Today's date: 2024  Patient name: Becky Hong  : 2000  MRN: [id number]  Referring provider: Teddy Valadez DO  Dx:   Encounter Diagnosis     ICD-10-CM    1. Cervicalgia  M54.2 Ambulatory Referral to Physical Therapy                     Assessment  Impairments: abnormal coordination, abnormal muscle firing, abnormal muscle tone, abnormal or restricted ROM, abnormal movement, impaired physical strength, lacks appropriate home exercise program, pain with function and poor posture   Symptom irritability: low    Assessment details: Becky Hong is a pleasant 24 y.o. female who presents with neck pain.  Patient's greatest concerns are worry over not knowing what's wrong and wanting to avoid painkillers.  Pt is present for neck issues that have arose over the past couple of months. She started having migraines in  which started more central at the top of her head, but recently have moved into the back of the head and into her shoulders. She feels this recent onset could be the start of a new job recently and built up stress and anxiety. Her neurologist that she sees for her migraines recommended PT. They also perform a brain MRI which showed a cavernoma, she will have a second MRI to rule out red flags. Excedrin helps her headaches but has not taken Maxalt yet as prescribed. She gets migraines about 3x a week, especially towards the end of her day, and feels they can be a result of the tightness in her neck and shoulders. Currently her pain is at the base of her neck 1/10 and reports it as \"someone grabbing or pressing,\" throbbing, and digging into her shoulderblades. She has noticed her neck pain wakes her in the middle of the night, but has decreased since getting a new pillow that allows her to lay more flat instead of propped. She sleeps on her L side or back. Other aggravating factors include bending down, anxiety stressors, turning head or body, looking up for sustained " period, looking down at work for sustained period. Alleviating factors include Voltaren, ice, heat, new pillow, and stretching. Denies previous injury to the neck. 5 Ds and 3 Ns: R eye feels strained sometimes, she feels disoriented if she turns her head fast, and sometimes gets tingling in her hands (she thinks related to her anxiety). Additionally, patient states she has had jaw pain since high school, as well as cracking, especially with yawning, chewing gum, and opening her mouth wide. 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 Pt goals for therapy are to learn different skills to do throughout the day relieve tension, and better manage symptoms.       Objectively, the patient displayed limitations in cervical mobility and ROM along with increased symptom sensitivity to movement and palpation. The primary movement problem is poor posture resulting in pain, muscle imbalances, and headaches and limiting her ability to work without symptoms. Etiologic factors include repetitive poor body mechanics, and increased anxiety/stress due to starting a new job.  Pt may be experiencing these symptoms and objective findings based on clinical presentation of cervical pain with mobility deficits, with radiculopathy, and with headaches, with possible TMJ involvement. No further referral is necessary at this time based upon examination results. Pt would benefit from skilled physical therapy services to address current deficits, improve quality of life, and restore PLOF with transition to home exercise program when appropriate. I expect she will increase cervical mobility/ROM and improve her posture while reporting decreased pain levels throughout the course of skilled PT.  Positive prognostic indicators include positive attitude toward recovery, good understanding of diagnosis and treatment plan options, and absence of observed red flags.  Negative prognostic indicators include chronicity of symptoms,  anxiety, and high symptom irritability.       Primary Impairments:  1) poor posture  2) decreased cervical mobility  3) decreased cervical ROM  4) poor ability to manage stress and anxiety  5) increased upper limb tension B     Function Based Goals:  Patient will be independent with HEP upon discharge.  Patient will be able to independently manage symptoms upon discharge.  Patient will resume prior level of function and home ADLs with minimal to no symptoms upon discharge.  Patient will be able to sleep through the night with minimal to no symptoms upon discharge.  Patient will be able to work a full work day with minimal to no symptoms upon discharge.  Patient will be able to drive with minimal to no symptoms upon discharge.    Impairment Based Goals:  Patient will increase cervical ROM to 10% limited as noted by therapist in 3 weeks.  Patient will increase cervical mobility as noted by therapist in 3 weeks.  Patient will increase postural awareness/control to good in 4 weeks.  Patient will demonstrate negative ULTT as noted by therapist in 4 weeks.        Understanding of Dx/Px/POC: good     Prognosis: good    Plan  Patient would benefit from: skilled physical therapy  Referral necessary: No  Planned modality interventions: cryotherapy, electrical stimulation/Russian stimulation, thermotherapy: hydrocollator packs, TENS, low level laser therapy and traction    Planned therapy interventions: abdominal trunk stabilization, activity modification, balance, body mechanics training, breathing training, coordination, flexibility, functional ROM exercises, home exercise program, therapeutic exercise, therapeutic activities, stretching, strengthening, postural training, patient education, neuromuscular re-education, motor coordination training, massage, manual therapy, joint mobilization and nerve gliding    Frequency: Every other week  Duration in weeks: 12  Treatment plan discussed with: patient  Plan details: 1x every  "other wk over the next 3 months with HEP          Subjective Evaluation    History of Present Illness  Date of onset: 5/1/2024  Mechanism of injury: Pt is present for neck issues that have arose over the past couple of months. She started having migraines in 2022 which started more central at the top of her head, but recently have moved into the back of the head and into her shoulders. She feels this recent onset could be the start of a new job recently and built up stress and anxiety. Her neurologist that she sees for her migraines recommended PT. They also perform a brain MRI which showed a cavernoma, she will have a second MRI to rule out red flags. Excedrin helps her headaches but has not taken Maxalt yet as prescribed. She gets migraines about 3x a week, especially towards the end of her day, and feels they can be a result of the tightness in her neck and shoulders. Currently her pain is at the base of her neck 1/10 and reports it as \"someone grabbing or pressing,\" throbbing, and digging into her shoulderblades. She has noticed her neck pain wakes her in the middle of the night, but has decreased since getting a new pillow that allows her to lay more flat instead of propped. She sleeps on her L side or back. Other aggravating factors include bending down, anxiety stressors, turning head or body, looking up for sustained period, looking down at work for sustained period. Alleviating factors include Voltaren, ice, heat, new pillow, and stretching. Denies previous injury to the neck. 5 Ds and 3 Ns: R eye feels strained sometimes, she feels disoriented if she turns her head fast, and sometimes gets tingling in her anxiety (she thinks related to her anxiety). Additionally, patient states she has had jaw pain since high school, as well as cracking, especially with yawning, chewing gum, and opening her mouth wide. 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 Pt goals for therapy are to learn " "different skills to do throughout the day relieve tension, and better manage symptoms.           Not a recurrent problem   Quality of life: good    Patient Goals  Patient goals for therapy: increased strength, return to sport/leisure activities, decreased pain, increased motion, improved balance and independence with ADLs/IADLs  Patient goal: learn different skills to do throughout the day relieve tension, better manage symptoms  Pain  Current pain ratin  At best pain ratin  At worst pain ratin  Location: midline cervical spine, surrounding musculature, and B shoulders  Quality: throbbing and pressure  Relieving factors: relaxation, rest, ice and heat  Exacerbated by: work-related activities, bending.  Progression: improved    Social Support  Lives with: parents    Employment status: working ()  Hand dominance: right  Life stress: work      Diagnostic Tests  MRI studies: abnormal (possible cavernoma)  Treatments  No previous or current treatments      Objective     General Comments:      Cervical/Thoracic Comments  Reflexes 2+ triceps, biceps, brachioradialis B  Myotomes: 2+ all B  Sharp Alex (-)  VBI: (-) B  Alar ligament: (-) B    Palpation: TTP B upper traps, B cervical paraspinals, R periscapular, C4-C6  Observation: FHP, rounded shoulders, mandible protrusion    P-A pressure C4-C6 hypomobile, p!    Cervical AROM  Flex: full, p!  Ext: 25% rhodes, p!  R rot: 15% rhodes, \"tension\" C/L  L rot: 25% rhodes, \"tension\" C/L  RSB: 25% rhodes, p!  LSB: full, p! C/L    Spurlings (+) B  Compression (+)  Distraction (+)    ULTT  Median nerve (+) B  Ulnar nerve (+) R  Radial nerve (+) R      Educated on anxiety and stress management, discussed possible TMJ pathology                       Precautions: anxiety  [id number]  POC expires Unit limit Auth Expiration date PT/OT + Visit Limit?   2024 BOMN N/a BOMN PCY                             Manuals  -   FOTO: 48          STM AB cervical paraspinals, " B upper traps          P-A mob AB C4-C6 grI                                Neuro Re-Ed           No monies 2x10 OTB    HEP          Cervical SNAG 20x    HEP          Cervical AROM 20x ea    HEP          Cervical retraction Seated 2x10    HEP          JUAN PABLO nv          Diaphragmatic breathing nv          Nerve glides nv                                           Ther Ex           UBE                                                                                        Ther Activity           education Anxiety/stress management, TMJ pathology, HEP, dx                     Gait Training                                 Modalities                                    Treatment performed by Roz Salmon, IZABELA under direct supervision of Kelly Angelucci, DPT

## 2024-08-23 ENCOUNTER — HOSPITAL ENCOUNTER (OUTPATIENT)
Dept: RADIOLOGY | Facility: HOSPITAL | Age: 24
Discharge: HOME | End: 2024-08-23
Attending: STUDENT IN AN ORGANIZED HEALTH CARE EDUCATION/TRAINING PROGRAM
Payer: COMMERCIAL

## 2024-08-23 DIAGNOSIS — D18.00 CAVERNOMA: ICD-10-CM

## 2024-08-23 PROCEDURE — 70553 MRI BRAIN STEM W/O & W/DYE: CPT

## 2024-08-23 PROCEDURE — A9585 GADOBUTROL INJECTION: HCPCS | Performed by: STUDENT IN AN ORGANIZED HEALTH CARE EDUCATION/TRAINING PROGRAM

## 2024-08-23 RX ORDER — GADOBUTROL 604.72 MG/ML
6 INJECTION INTRAVENOUS
Status: COMPLETED | OUTPATIENT
Start: 2024-08-23 | End: 2024-08-23

## 2024-08-23 RX ADMIN — GADOBUTROL 6 ML: 604.72 INJECTION INTRAVENOUS at 18:55

## 2024-08-26 DIAGNOSIS — D18.00 CAVERNOMA: Primary | ICD-10-CM

## 2024-08-28 ENCOUNTER — APPOINTMENT (OUTPATIENT)
Dept: PHYSICAL THERAPY | Facility: OTHER | Age: 24
End: 2024-08-28
Payer: COMMERCIAL

## 2024-09-04 ENCOUNTER — TELEPHONE (OUTPATIENT)
Dept: DERMATOLOGY | Facility: CLINIC | Age: 24
End: 2024-09-04

## 2024-09-04 ENCOUNTER — OFFICE VISIT (OUTPATIENT)
Dept: DERMATOLOGY | Facility: CLINIC | Age: 24
End: 2024-09-04
Payer: COMMERCIAL

## 2024-09-04 ENCOUNTER — OFFICE VISIT (OUTPATIENT)
Dept: PHYSICAL THERAPY | Facility: OTHER | Age: 24
End: 2024-09-04
Payer: COMMERCIAL

## 2024-09-04 VITALS — TEMPERATURE: 97.2 F | HEIGHT: 67 IN | BODY MASS INDEX: 19.62 KG/M2 | WEIGHT: 125 LBS

## 2024-09-04 DIAGNOSIS — M54.2 CERVICALGIA: Primary | ICD-10-CM

## 2024-09-04 DIAGNOSIS — L30.9 DERMATITIS: Primary | ICD-10-CM

## 2024-09-04 PROCEDURE — 97140 MANUAL THERAPY 1/> REGIONS: CPT

## 2024-09-04 PROCEDURE — 99214 OFFICE O/P EST MOD 30 MIN: CPT

## 2024-09-04 PROCEDURE — 97112 NEUROMUSCULAR REEDUCATION: CPT

## 2024-09-04 RX ORDER — RUXOLITINIB 15 MG/G
CREAM TOPICAL
Qty: 60 G | Refills: 2 | Status: SHIPPED | OUTPATIENT
Start: 2024-09-04

## 2024-09-04 NOTE — TELEPHONE ENCOUNTER
Hello all,   Please initiate prior authorization for Opzelura 1.5% cream for this patient. She tried and failed two months of topical hydrocortisone 2.5% ointment and Elidel.     Thanks,   Madhuri Adler PA-C

## 2024-09-04 NOTE — PROGRESS NOTES
"Daily Note     Today's date: 2024  Patient name: Becky Hong  : 2000  MRN: [id number]  Referring provider: Teddy Valadez DO  Dx:   Encounter Diagnosis     ICD-10-CM    1. Cervicalgia  M54.2                      Subjective: \"My neck and shoulders have been doing a lot better. The exercises have been helping a lot.\"      Objective: See treatment diary below      Assessment: Session focused on symptom relief and cervical/thoracic mobility. Reported decreased symptoms in the neck and R scapula post manual treatment. Initiated thoracic mobility and ulnar/median nerve mobility exercises and tolerated well. Updated and discussed HEP frequency and intensity and pt verbalized understanding. Continue cervical/thoracic/nerve mobility next visit while monitoring symptoms.      Plan: Continue per plan of care.      Precautions: anxiety  [id number]  POC expires Unit limit Auth Expiration date PT/OT + Visit Limit?   2024 BOMN N/a BOMN PCY                             Manuals  -   FOTO: 48 2 -          STM AB cervical paraspinals, B upper traps          P-A mob AB C4-C6 grI          Cupping  3'; cervical paraspinals and R mid trap                    Neuro Re-Ed           No monies 2x10 OTB    HEP          Cervical SNAG 20x    HEP          Cervical AROM 20x ea    HEP With cups 3'         Cervical retraction Seated 2x10    HEP Seated 2x10         JUAN PABLO nv 2x10    HEP         Diaphragmatic breathing nv 1'; 4 ct inhale, 6 ct exhale         Nerve glides nv Ulnar 15x                                          Ther Ex           UBE                                                                                        Ther Activity           education Anxiety/stress management, TMJ pathology, HEP, dx                     Gait Training                                 Modalities                                    Treatment performed by IZABELA Acharya under direct supervision of Kelly Angelucci, DPT on Sarah's " account

## 2024-09-04 NOTE — PROGRESS NOTES
"[facility] Note     Patient Name: Becky Hong  Encounter Date: 9/4/2024     Have you been cared for by a [facility] Dermatologist in the last 3 years and, if so, which description applies to you?    Yes.  I have been here within the last 3 years, and my medical history has NOT changed since that time.  I am FEMALE/of child-bearing potential.    REVIEW OF SYSTEMS:  Have you recently had or currently have any of the following? No changes in my recent health.   PAST MEDICAL HISTORY:  Have you personally ever had or currently have any of the following?  If \"YES,\" then please provide more detail. No changes in my medical history.   HISTORY OF IMMUNOSUPPRESSION: Do you have a history of any of the following:  Systemic Immunosuppression such as Diabetes, Biologic or Immunotherapy, Chemotherapy, Organ Transplantation, Bone Marrow Transplantation or Prednisone?  No     Answering \"YES\" requires the addition of the dotphrase \"IMMUNOSUPPRESSED\" as the first diagnosis of the patient's visit.   FAMILY HISTORY:  Any \"first degree relatives\" (parent, brother, sister, or child) with the following?    No changes in my family's known health.   PATIENT EXPERIENCE:    Do you want the Dermatologist to perform a COMPLETE skin exam today including a clinical examination under the \"bra and underwear\" areas?  NO  If necessary, do we have your permission to call and leave a detailed message on your Preferred Phone number that includes your specific medical information?  Yes      No Known Allergies   Current Outpatient Medications:     acetaminophen (TYLENOL) 325 mg tablet, Take 650 mg by mouth every 6 (six) hours as needed for mild pain, Disp: , Rfl:     Cetirizine HCl (ZYRTEC ALLERGY PO), Take by mouth if needed, Disp: , Rfl:     hydrocortisone 2.5 % ointment, Apply on its own to upper and lower eyelids twice a day for 3 days only, then combine with Elidel and apply twice a day for 2 days only, then stop., Disp: 30 " g, Rfl: 1    ibuprofen (MOTRIN) 400 mg tablet, Take by mouth every 6 (six) hours as needed for mild pain, Disp: , Rfl:     ketoconazole (NIZORAL) 2 % shampoo, Use as shampoo at least 3 times per week to scalp. Lather into scalp and let sit for 5 minutes before rinsing. Can use as face wash., Disp: 100 mL, Rfl: 10    omeprazole (PriLOSEC) 40 MG capsule, TAKE 1 CAPSULE BY MOUTH EVERY DAY, Disp: 90 capsule, Rfl: 1    pimecrolimus (ELIDEL) 1 % cream, Combine with hydrocortisone 2.5% ointment and apply twice a day to upper and lower eyelids for 2 days only, then apply on its own twice a day for the next 5 days or until rash resolves. Then use as needed., Disp: 30 g, Rfl: 2    rizatriptan (Maxalt) 10 mg tablet, Take 1 tablet (10 mg total) by mouth as needed for migraine Take at the onset of migraine; if symptoms continue or return, may take another dose at least 2 hours after first dose. Take no more than 2 doses in a day., Disp: 10 tablet, Rfl: 6    desonide (DESOWEN) 0.05 % cream, Apply topically 2 (two) times a day for 10 days, Disp: 15 g, Rfl: 1    dicyclomine (BENTYL) 10 mg capsule, TAKE 1 CAPSULE BY MOUTH 4 TIMES A DAY (BEFORE MEALS AND AT BEDTIME) (Patient not taking: Reported on 7/19/2024), Disp: 360 capsule, Rfl: 1          Whom besides the patient is providing clinical information about today's encounter?   NO ADDITIONAL HISTORIAN (patient alone provided history)    Physical Exam and Assessment/Plan by Diagnosis:      DERMATITIS:  Atopic Dermatitis or Contact Dermatitis, Seborrheic dermatitis less likely      Physical Exam:  Anatomic Location Affected &  Morphological Description:  Greasy adherent scale/scaling plaques on the upper and lower eyelids, no flaking throughout scalp         Additional History of Present Condition:  Patient presents as a follow up. She was seen on 7/16/2024 and placed on ketoconazole 2% shampoo for the face and scalp, hydrocortisone 2.5% with transition to Elidel. Patient notes  improvement in her scalp and eyelids with the shampoo, and improvement with the eyelids using the hydrocortisone. She denies improvement with the Elidel. She notes she uses mascara once since her last appointment and this flared her eyelids. She notes her eyelids continue to be scaly and crack. She would like to proceed with the patch testing.      Assessment and Plan:  Based on a thorough discussion of this condition and the management approach to it (including a comprehensive discussion of the known risks, side effects and potential benefits of treatment), the patient (family) agrees to implement the following specific plan:            SCALP  Continue Ketoconazole 2% shampoo- Lather into scalp and allow to sit for 5-10 min then rinse. Please apply three times a week (Monday, Wednesday, Friday). Can use as face wash as well.            FACE  Can use Ketoconazole 2% shampoo as face wash.   Start Opzelura 1.5 % cream, apply 2 times day on affected areas of upper and lower eyelids. Patient has tried and failed therapy with hydrocortisone 1.5% ointment and Elidel for 2 months. Will send communication to prior authorization team to initiate prior authorization.   Discontinue hydrocortisone 2.5% ointment and Elidel at this time.   Will initiate patch testing. Order signed.   Continue using CeraVe Gentle gentle facial cleanser.  Recommend avoiding use of makeup at this time.   Follow-up: 1-2 months following patch testing.       Scribe Attestation      I,:  Zulma Man MA am acting as a scribe while in the presence of the attending physician.:       I,:  Madhuri Adler PA-C personally performed the services described in this documentation    as scribed in my presence.:

## 2024-09-04 NOTE — TELEPHONE ENCOUNTER
Attempted to submit PA for Opzelura through [employer] but patient was not coming up as an active member. I did reach out to patient's pharmacy and they stated that she has [facility] but a different ID as [id number].  Submitted new PA again with updated ID number and patient was still coming up as inactive. I did reach out and spoke to patient to give her update on current situation. I advised the recent insurance card on file was scanned in 2023 and if she received any new insurance. She stated that she is under her parents insurance and is not really sure if she has any new coverage but she would look into it. I advised her to reach out via [employer] with an update and if she does have new coverage she can send a picture of front and back of card and send it via [employer]. She confirmed understanding.

## 2024-09-04 NOTE — PATIENT INSTRUCTIONS
SEBORRHEIC DERMATITIS-SUSPECTED; versus Atopic Dermatitis or Contact Dermatitis        Assessment and Plan:  Based on a thorough discussion of this condition and the management approach to it (including a comprehensive discussion of the known risks, side effects and potential benefits of treatment), the patient (family) agrees to implement the following specific plan:            SCALP  Start Ketoconazole 2% shampoo- Lather into scalp and allow to sit for 5-10 min then rinse. Please apply three times a week (Monday, Wednesday, Friday). Can use as face wash as well.            FACE  Can use Ketoconazole 2% shampoo as face wash.   Start Opzelura 1.5 % cream, apply 2 times day on affected areas  Discussed patch testing if no improvement with topicals as this could be a contact dermatitis.   Continue using CeraVe Gentle Facial Cleanser.  Recommend avoiding use of makeup at this time.   Recommends using a mineral base sunscreen with active ingredients of zinc oxide or titanium dioxide such as Neutrogena Sheer Zinc sunscreen.   Patch testing ordered for patient  Follow-up: November 22, 2024 at 8 Am

## 2024-09-05 ENCOUNTER — NURSE TRIAGE (OUTPATIENT)
Dept: OTHER | Facility: OTHER | Age: 24
End: 2024-09-05

## 2024-09-05 NOTE — TELEPHONE ENCOUNTER
Regarding: Headache/Medication questions and concerns  ----- Message from Florence HAWTHORNE sent at 9/5/2024  7:14 PM EDT -----  Patient is calling back patient is advise that she is still in queue to be called back

## 2024-09-05 NOTE — TELEPHONE ENCOUNTER
"Regarding: Headache/Medication questions and concerns  ----- Message from Florence HAWTHORNE sent at 9/5/2024  6:32 PM EDT -----  \"I took an Excedrin for  migraine and I was prescribed rizatriptan (Maxalt) 10 mg tablet  but I am not sure if I can combine medication. I am feeling kind of shaky right now.\"    "

## 2024-09-06 NOTE — TELEPHONE ENCOUNTER
"Reason for Disposition  • [1] Caller has NON-URGENT medicine question about med that PCP prescribed AND [2] triager unable to answer question  • Caller wants to use a complementary or alternative medicine    Answer Assessment - Initial Assessment Questions  1. NAME of MEDICATION: \"What medicine are you calling about?\"      Excedrin  1 tablet  4:45 pm and it did not help with the pain.Maxalt  7:50 pm  2. QUESTION: \"What is your question?\" (e.g., medication refill, side effect)      Can I take these two medication together?  3. PRESCRIBING HCP: \"Who prescribed it?\" Reason: if prescribed by specialist, call should be referred to that group.      Dr Valadez   4. SYMPTOMS: \"Do you have any symptoms?\"      She was feeling shaky after the Excedrin and currently does not feel any shakiness after eating.  5. SEVERITY: If symptoms are present, ask \"Are they mild, moderate or severe?\"      Migraine is  better after Maxalt taken 10 minutes ago. 8/10 before the Excedrin and now 3/10 after the Maxalt  6. PREGNANCY:  \"Is there any chance that you are pregnant?\" \"When was your last menstrual period?\"      No    Protocols used: Medication Question Call-ADULT-    "

## 2024-09-06 NOTE — TELEPHONE ENCOUNTER
No problem on using Excedrin with Rizatriptan if needed. If she does not tolerate Excedrin, she can avoid it going forward

## 2024-09-06 NOTE — TELEPHONE ENCOUNTER
Pt does not plan on taking anymore Excedrin tonight for her headache again due to it was not effective this evening , but would like to know if she is able to take it while on Maxalt ? She had great improvement in her headache from 8/10 to 3/10 after the Maxalt this evening. She also experienced mild shakiness this evening after taking Excedrin that has resolved after having  a meal. Please call pt tomorrow to discuss if the two medications are safe for future knowledge.

## 2024-09-09 ENCOUNTER — TELEPHONE (OUTPATIENT)
Dept: PSYCHIATRY | Facility: CLINIC | Age: 24
End: 2024-09-09

## 2024-09-09 NOTE — TELEPHONE ENCOUNTER
One week follow up call for New Patient appointment with   STANLEY Murrell   on 12/04/2024 was made on 09/09/2024. Writer informed patient of New Patient paperwork needing to be completed 5 days prior to the appointment. Writer confirmed paperwork has been sent via [employer].    Appointment was made on: 09/03/2024

## 2024-09-16 ENCOUNTER — OFFICE VISIT (OUTPATIENT)
Dept: PHYSICAL THERAPY | Facility: OTHER | Age: 24
End: 2024-09-16
Payer: COMMERCIAL

## 2024-09-16 DIAGNOSIS — M54.2 CERVICALGIA: Primary | ICD-10-CM

## 2024-09-16 PROCEDURE — 97140 MANUAL THERAPY 1/> REGIONS: CPT

## 2024-09-16 PROCEDURE — 97112 NEUROMUSCULAR REEDUCATION: CPT

## 2024-09-16 NOTE — PROGRESS NOTES
Daily Note     Today's date: 2024  Patient name: Becky Hong  : 2000  MRN: [id number]  Referring provider: Teddy Valadez DO  Dx:   Encounter Diagnosis     ICD-10-CM    1. Cervicalgia  M54.2           Start Time:   Stop Time:   Total time in clinic (min): 38 minutes    Subjective: Patient reports new onset of L UT pain this week but otherwise states a positive response with exercises initiated last session.       Objective: See treatment diary below      Assessment: Patient tolerated treatment session well. Continued focusing on improving scapulo-thoracic and C/s mobility and strength. Exercises encouraged to be performed within a pain-free comfortable range to avoid over exacerbating symptoms. Responded well to myofascial decompression as evidenced in a reduction of tension and pain after completion. Required cues for proper breathing mechanics and engagement of diaphragm. Concluded session with cool down on UBE for ROM and postural strengthening which was tolerated well by patient. Patient demonstrated fatigue post-tx and would benefit from continued PT to improve level of function.       Plan: Continue per POC. Increase reps/resistance as tolerated.      Precautions: anxiety  [id number]  POC expires Unit limit Auth Expiration date PT/OT + Visit Limit?   2024 BOMN N/a BOMN PCY4                             Manuals   FOTO: 48  - 9/4 3 - 9/16        STM AB cervical paraspinals, B upper traps          P-A mob AB C4-C6 grI          Cupping  3'; cervical paraspinals and R mid trap 3'; L UT and cervical paraspinals                    Neuro Re-Ed           No monies 2x10 OTB    HEP  2x10 OTB        Cervical SNAG 20x    HEP  2x10        Cervical AROM 20x ea    HEP With cups 3' With cups 3'          Cervical retraction Seated 2x10    HEP Seated 2x10 Seated 2x10        JUAN PABLO nv 2x10    HEP 2x10        Diaphragmatic breathing nv 1'; 4 ct inhale, 6 ct exhale 1'; 4 ct inhale, 6 ct exhale         Nerve glides nv Ulnar 15x Ulnar 15x                                         Ther Ex           UBE   Retro 5'                                                                                     Ther Activity           education Anxiety/stress management, TMJ pathology, HEP, dx                     Gait Training                                 Modalities

## 2024-09-23 ENCOUNTER — CONSULT (OUTPATIENT)
Dept: NEUROSURGERY | Facility: CLINIC | Age: 24
End: 2024-09-23
Payer: COMMERCIAL

## 2024-09-23 VITALS
TEMPERATURE: 97.8 F | BODY MASS INDEX: 19.78 KG/M2 | OXYGEN SATURATION: 98 % | HEIGHT: 67 IN | DIASTOLIC BLOOD PRESSURE: 60 MMHG | HEART RATE: 58 BPM | SYSTOLIC BLOOD PRESSURE: 96 MMHG | WEIGHT: 126 LBS | RESPIRATION RATE: 18 BRPM

## 2024-09-23 DIAGNOSIS — I78.1 TELANGIECTASIA: Primary | ICD-10-CM

## 2024-09-23 DIAGNOSIS — D18.00 CAVERNOMA: ICD-10-CM

## 2024-09-23 PROCEDURE — 99204 OFFICE O/P NEW MOD 45 MIN: CPT | Performed by: NEUROLOGICAL SURGERY

## 2024-09-23 NOTE — PROGRESS NOTES
Ambulatory Visit  Name: Becky Hong      : 2000      MRN: [id number]  Encounter Provider: Jacob Nagel MD  Encounter Date: 2024   Encounter 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  Natural history of both cavernous malformations and capillary telangiectasias.  Capillary dilatations are normal vascular variants with no significant hemorrhage risk.  Cavernous formations are low flow low pressure vascular malformations that you have propensity for using over time, however, these typically do not have the subtle enhancement that telangiectasias may have and which is present in her MRI.  Given her young age and new diagnosis we will plan for 1 follow-up MRI in a year.  If this is stable and remains consistent with likely capillary ectasia we will follow her up as needed.    Plan that I do not believe that this is the cause of her headaches.  We did discuss emergency signs for which I would be worried about including unilateral or focal numbness, tingling, weakness or sudden unexpected headache which is different than her typical migraines.    Cavernoma    Orders:    Ambulatory referral to Neurosurgery    MRI brain with and without contrast; Future      History of Present Illness   This is a very pleasant 24-year-old female who presents for evaluation of possible cavernous malformation until ectasia that was discovered in evaluation of migraines.  While she has had migraines for a while she did have a a week or so where she began to have progressive worsening migraines over the course of 1 to 2 weeks.  As such an MRI of her brain was ordered.  Subsequent MRI with and without contrast was then ordered.  She was referred for evaluation.  She states that she still does have intermittent migraines but it is not as bad as it was previously.  She is on medication and sees Dr. Valadez for  "these.    Past medical and surgical history is negligible.    She is not allergic to any medications.    She is not .  She has no children.  She currently works in biomedical engineering.  No smoking or alcohol abuse.  No illicit drug use.    No family history of intracranial hemorrhage.    Review of Systems   Constitutional: Negative.    HENT: Negative.     Eyes:  Positive for visual disturbance (EYE PAIN BOTH  EYES  WITH HEADACHES MORE ON RIGHT).   Respiratory: Negative.     Cardiovascular: Negative.    Gastrointestinal: Negative.    Endocrine: Negative.    Genitourinary: Negative.    Musculoskeletal:  Positive for neck pain (BOTTOM OF HEAD). Negative for gait problem.   Skin: Negative.    Neurological:  Positive for dizziness, light-headedness (FEELS FOGGY AT TIMES), numbness (HANDS AND ARMS WITH MIGRAINES) and headaches (MIGRAINES  SINCE 2021 JULY 2024 GOT WORSE). Negative for speech difficulty and weakness.   Hematological: Negative.    Psychiatric/Behavioral:  Negative for behavioral problems and confusion.      I have personally reviewed the MA's review of systems and made changes as necessary.      Objective     BP 96/60 (BP Location: Right arm, Patient Position: Sitting, Cuff Size: Standard)   Pulse 58   Temp 97.8 °F (36.6 °C) (Temporal)   Resp 18   Ht 5' 7\" (1.702 m)   Wt 57.2 kg (126 lb)   SpO2 98%   BMI 19.73 kg/m²     Physical Exam  Neurologic Exam  She is well appearing.  Affect is appropriate. Body mass index is 19.73 kg/m².. She is awake alert and oriented.  Hearing and vision are grossly intact.   Her pupils are equal round reactive to light.  Her extraocular movements are intact.  Her face is symmetric.  Tongue is midline.  Facial sensation is intact and symmetric throughout.  Shoulder shrug is 5/5.  There is no drift or dysmetria.     She has full strength in her bilateral upper and lower extremities.  She has normal muscle tone muscle bulk.  Her biceps reflexes and patellar reflexes " are 2+ and symmetric.  Ibrahima sign negative bilaterally.  Sensation intact to light touch and pinprick throughout.  Her gait is normal.     Her heart rate is regular.  Normal respiratory effort.  Abdomen nondistended.  Radial pulses 2+.     Imaging personally reviewed in detail with patient as well as reports. Of note, small hyperintensity of ion with SWI blooming consistent with possible CT versus Now.      Administrative Statements   I have spent a total time of 45 minutes in caring for this patient on the day of the visit/encounter including Diagnostic results, Prognosis, Risks and benefits of tx options, Instructions for management, Patient and family education, Importance of tx compliance, Risk factor reductions, Impressions, Counseling / Coordination of care, Documenting in the medical record, Reviewing / ordering tests, medicine, procedures  , Obtaining or reviewing history  , and Communicating with other healthcare professionals .

## 2024-09-23 NOTE — ASSESSMENT & PLAN NOTE
Orders:    Ambulatory referral to Neurosurgery    MRI brain with and without contrast; Future    
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  Natural history of both cavernous malformations and capillary telangiectasias.  Capillary dilatations are normal vascular variants with no significant hemorrhage risk.  Cavernous formations are low flow low pressure vascular malformations that you have propensity for using over time, however, these typically do not have the subtle enhancement that telangiectasias may have and which is present in her MRI.  Given her young age and new diagnosis we will plan for 1 follow-up MRI in a year.  If this is stable and remains consistent with likely capillary ectasia we will follow her up as needed.    Plan that I do not believe that this is the cause of her headaches.  We did discuss emergency signs for which I would be worried about including unilateral or focal numbness, tingling, weakness or sudden unexpected headache which is different than her typical migraines.    
Detail Level: Detailed

## 2024-09-30 ENCOUNTER — OFFICE VISIT (OUTPATIENT)
Dept: PHYSICAL THERAPY | Facility: OTHER | Age: 24
End: 2024-09-30
Payer: COMMERCIAL

## 2024-09-30 DIAGNOSIS — M54.2 CERVICALGIA: Primary | ICD-10-CM

## 2024-09-30 PROCEDURE — 97110 THERAPEUTIC EXERCISES: CPT

## 2024-09-30 PROCEDURE — 97140 MANUAL THERAPY 1/> REGIONS: CPT

## 2024-09-30 NOTE — PROGRESS NOTES
"Daily Note     Today's date: 2024  Patient name: Becky Hong  : 2000  MRN: [id number]  Referring provider: Teddy Valadez DO  Dx:   Encounter Diagnosis     ICD-10-CM    1. Cervicalgia  M54.2                      Subjective: \"I have been feeling good. I do my exercises and do my breathing at night which is really relaxing for me. I have noticed that my symptoms are worse at night after my day but try to move my computer so it's at eye level and getting up and talking a walk from my desk if I need to.\"       Objective: See treatment diary below      Assessment: Session focused on STM and cupping, tolerated treatment well and reported improvement in symptoms and ROM pre to post treatment. Noted hypomobility in joint mobs, will continue in future visits.       Plan: Continue per plan of care.      Precautions: anxiety  [id number]  POC expires Unit limit Auth Expiration date PT/OT + Visit Limit?   2024 BOMN N/a BOMN PCY4                             Manuals  -   FOTO: 48 2 -  3 -        STM AB cervical paraspinals, B upper traps   AB cervical paraspinals, B upper traps       Cervical joint mobs     Side glide and extension KA grIII-IV       P-A mob AB C4-C6 grI          Cupping  3'; cervical paraspinals and R mid trap 3'; L UT and cervical paraspinals  3'; L UT and cervical paraspinals                   Neuro Re-Ed           No monies 2x10 OTB    HEP  2x10 OTB        Cervical SNAG 20x    HEP  2x10        Cervical AROM 20x ea    HEP With cups 3' With cups 3'          Cervical retraction Seated 2x10    HEP Seated 2x10 Seated 2x10        JUAN PABLO nv 2x10    HEP 2x10        Diaphragmatic breathing nv 1'; 4 ct inhale, 6 ct exhale 1'; 4 ct inhale, 6 ct exhale        Nerve glides nv Ulnar 15x Ulnar 15x                                         Ther Ex           UBE   Retro 5' Retro 5'                                                                                    Ther Activity         "   education Anxiety/stress management, TMJ pathology, HEP, dx                     Gait Training                                 Modalities

## 2024-10-14 ENCOUNTER — OFFICE VISIT (OUTPATIENT)
Dept: PHYSICAL THERAPY | Facility: OTHER | Age: 24
End: 2024-10-14
Payer: COMMERCIAL

## 2024-10-14 DIAGNOSIS — M54.2 CERVICALGIA: Primary | ICD-10-CM

## 2024-10-14 PROCEDURE — 97112 NEUROMUSCULAR REEDUCATION: CPT

## 2024-10-14 PROCEDURE — 97140 MANUAL THERAPY 1/> REGIONS: CPT

## 2024-10-14 PROCEDURE — 97110 THERAPEUTIC EXERCISES: CPT

## 2024-10-14 NOTE — PROGRESS NOTES
"Daily Note     Today's date: 10/14/2024  Patient name: Becky Hong  : 2000  MRN: [id number]  Referring provider: Teddy Valadez DO  Dx:   Encounter Diagnosis     ICD-10-CM    1. Cervicalgia  M54.2                      Subjective: \"I have been getting pain in my L arm, it especially gets worse if I lay on my shoulder at night.\"      Objective: See treatment diary below      Assessment: Repeated chin retraction extension reduced symptoms in L UE to 0/10, educated on adding in to HEP. Continue to have increased muscular tension in cervical and periscapular musculature. Added postural strengthening today, required cueing for proper form. Will continue to progress in future visits as tolerated.       Plan: Continue per plan of care.      Precautions: anxiety  [id number]  POC expires Unit limit Auth Expiration date PT/OT + Visit Limit?   2024 BOMN N/a BOMN PCY4                             Manuals  -   FOTO: 48  - 9/4 3 -  - 10/14  FOTO: 68      STM AB cervical paraspinals, B upper traps   AB cervical paraspinals, B upper traps AB cervical paraspinals, B upper traps      Cervical joint mobs     Side glide and extension KA grIII-IV Side glide and extension KA grIII-IV      P-A mob AB C4-C6 grI          Cupping  3'; cervical paraspinals and R mid trap 3'; L UT and cervical paraspinals  3'; L UT and cervical paraspinals                   Neuro Re-Ed           No monies 2x10 OTB    HEP  2x10 OTB        Cervical SNAG 20x    HEP  2x10        Cervical AROM 20x ea    HEP With cups 3' With cups 3'          Cervical retraction Seated 2x10    HEP Seated 2x10 Seated 2x10        JUAN PABLO nv 2x10    HEP 2x10        Diaphragmatic breathing nv 1'; 4 ct inhale, 6 ct exhale 1'; 4 ct inhale, 6 ct exhale        Nerve glides nv Ulnar 15x Ulnar 15x  15x ea ulnar, radial, median      Rows / LPD      GTB 2x10 ea       Repeated cervical retraction extension      10x                  Ther Ex           UBE   Retro " 5' Retro 5' Retro 6'                                                                                   Ther Activity           education Anxiety/stress management, TMJ pathology, HEP, dx                     Gait Training                                 Modalities

## 2024-10-24 ENCOUNTER — OFFICE VISIT (OUTPATIENT)
Dept: PHYSICAL THERAPY | Facility: OTHER | Age: 24
End: 2024-10-24
Payer: COMMERCIAL

## 2024-10-24 DIAGNOSIS — M54.2 CERVICALGIA: Primary | ICD-10-CM

## 2024-10-24 PROCEDURE — 97112 NEUROMUSCULAR REEDUCATION: CPT

## 2024-10-24 PROCEDURE — 97140 MANUAL THERAPY 1/> REGIONS: CPT

## 2024-10-24 NOTE — PROGRESS NOTES
"Daily Note     Today's date: 10/24/2024  Patient name: Becky Hong  : 2000  MRN: [id number]  Referring provider: Teddy Valadez DO  Dx:   Encounter Diagnosis     ICD-10-CM    1. Cervicalgia  M54.2                      Subjective: Pt states that overall this week has been \"pretty good.\" Lt arm pain has not reagan present as much, she has changed the way that she has been laying on it when she sleeps. Today she feels a little stressed because of work and traffic.       Objective: See treatment diary below      Assessment: Pt tolerated session well without increase in pain. Continued postural strengthening. Continues to have increased muscular tension in cervical and periscapular musculature. Added postural strengthening today, required cueing for proper form. Would benefit from progression of resistance next visit.       Plan: Continue per plan of care.      Precautions: anxiety  [id number]  POC expires Unit limit Auth Expiration date PT/OT + Visit Limit?   2024 BOMN N/a BOMN PCY4                             Manuals   FOTO: 48  - 9/4 3 - 9/16 4 - 9/30 5 - 10/14  FOTO: 68  - 10/24     STM AB cervical paraspinals, B upper traps   AB cervical paraspinals, B upper traps AB cervical paraspinals, B upper traps AB cervical paraspinals, B upper traps     Cervical joint mobs     Side glide and extension KA grIII-IV Side glide and extension KA grIII-IV Side glide and extension KA grIII-IV     P-A mob AB C4-C6 grI          Cupping  3'; cervical paraspinals and R mid trap 3'; L UT and cervical paraspinals  3'; L UT and cervical paraspinals                   Neuro Re-Ed           No monies 2x10 OTB    HEP  2x10 OTB        Cervical SNAG 20x    HEP  2x10        Cervical AROM 20x ea    HEP With cups 3' With cups 3'          Cervical retraction Seated 2x10    HEP Seated 2x10 Seated 2x10        JUAN PABLO nv 2x10    HEP 2x10        Diaphragmatic breathing nv 1'; 4 ct inhale, 6 ct exhale 1'; 4 ct inhale, 6 ct exhale    "     Nerve glides nv Ulnar 15x Ulnar 15x  15x ea ulnar, radial, median 15x ea ulnar, radial, median     Rows / LPD      GTB 2x10 ea  GTB 2x10 ea      Repeated cervical retraction extension      10x  10x2                Ther Ex           UBE   Retro 5' Retro 5' Retro 6'                                                                                   Ther Activity           education Anxiety/stress management, TMJ pathology, HEP, dx                     Gait Training                                 Modalities

## 2024-11-08 ENCOUNTER — TELEPHONE (OUTPATIENT)
Age: 24
End: 2024-11-08

## 2024-11-08 NOTE — TELEPHONE ENCOUNTER
Patient received automated call confirming appointment for old appointment date of 11/22 which had been rescheduled to 11/11    Confirmed date/time/location of their next appointment.

## 2024-11-11 ENCOUNTER — TELEPHONE (OUTPATIENT)
Dept: DERMATOLOGY | Facility: CLINIC | Age: 24
End: 2024-11-11

## 2024-11-11 ENCOUNTER — OFFICE VISIT (OUTPATIENT)
Dept: PHYSICAL THERAPY | Facility: OTHER | Age: 24
End: 2024-11-11
Payer: COMMERCIAL

## 2024-11-11 ENCOUNTER — OFFICE VISIT (OUTPATIENT)
Dept: DERMATOLOGY | Facility: CLINIC | Age: 24
End: 2024-11-11
Payer: COMMERCIAL

## 2024-11-11 VITALS — HEIGHT: 67 IN | BODY MASS INDEX: 19.42 KG/M2 | WEIGHT: 123.7 LBS | TEMPERATURE: 97.2 F

## 2024-11-11 DIAGNOSIS — L30.9 DERMATITIS: Primary | ICD-10-CM

## 2024-11-11 DIAGNOSIS — M54.2 CERVICALGIA: Primary | ICD-10-CM

## 2024-11-11 PROCEDURE — 99214 OFFICE O/P EST MOD 30 MIN: CPT

## 2024-11-11 PROCEDURE — 97110 THERAPEUTIC EXERCISES: CPT

## 2024-11-11 PROCEDURE — 97112 NEUROMUSCULAR REEDUCATION: CPT

## 2024-11-11 RX ORDER — HYDROCORTISONE 25 MG/G
OINTMENT TOPICAL
Qty: 30 G | Refills: 0 | Status: SHIPPED | OUTPATIENT
Start: 2024-11-11

## 2024-11-11 NOTE — PROGRESS NOTES
"Daily Note     Today's date: 2024  Patient name: Becky Hong  : 2000  MRN: [id number]  Referring provider: Teddy Valadez DO  Dx:   Encounter Diagnosis     ICD-10-CM    1. Cervicalgia  M54.2                      Subjective: \"I have been feeling pretty good. 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 Tolerated treatment well with some fatigue towards the end of session. Will continue to progress in future visits as tolerated. Wall slides, wall walks, and UT lift nv.       Plan: Continue per plan of care.      Precautions: anxiety  [id number]  POC expires Unit limit Auth Expiration date PT/OT + Visit Limit?   2024 BOMN N/a BOMN PCY4                             Manuals   FOTO: 48 2 - 9/4 3 - 9/16 4 - 9/30 5 - 10/14  FOTO: 68 6 - 10/24 7 -     STM AB cervical paraspinals, B upper traps   AB cervical paraspinals, B upper traps AB cervical paraspinals, B upper traps AB cervical paraspinals, B upper traps     Cervical joint mobs     Side glide and extension KA grIII-IV Side glide and extension KA grIII-IV Side glide and extension KA grIII-IV     P-A mob AB C4-C6 grI          Cupping  3'; cervical paraspinals and R mid trap 3'; L UT and cervical paraspinals  3'; L UT and cervical paraspinals                   Neuro Re-Ed           No monies 2x10 OTB    HEP  2x10 OTB    With chin retraction GTB 5\"x20    Cervical SNAG 20x    HEP  2x10        Cervical AROM 20x ea    HEP With cups 3' With cups 3'          Cervical retraction Seated 2x10    HEP Seated 2x10 Seated 2x10        JUAN PABLO nv 2x10    HEP 2x10        Diaphragmatic breathing nv 1'; 4 ct inhale, 6 ct exhale 1'; 4 ct inhale, 6 ct exhale        Nerve glides nv Ulnar 15x Ulnar 15x  15x ea ulnar, radial, median 15x ea ulnar, radial, median     Rows / LPD      " GTB 2x10 ea  GTB 2x10 ea  3x10 GTB    Repeated cervical retraction extension      10x  10x2 2x10 with wiggle    Cervical retraction with lift       Supine 2x10     Ther Ex           UBE   Retro 5' Retro 5' Retro 6'  Retro 5'    RTC lift        OTB 2x8 B                                                                      Ther Activity           education Anxiety/stress management, TMJ pathology, HEP, dx                     Gait Training                                 Modalities

## 2024-11-11 NOTE — TELEPHONE ENCOUNTER
Hello prior authorization team,   the order for patch testing was ordered at the prior visit and the patient did not hear from our office about prior authorization or scheduling this. Can we initiate a prior authorization for this. I have Cc'john Ray for scheduling once approved.     Thanks,   Madhuri Adler PA-C

## 2024-11-11 NOTE — PROGRESS NOTES
"[facility] Note     Patient Name: Becky Hong  Encounter Date: 11/11/24     Have you been cared for by a [facility]atologist in the last 3 years and, if so, which description applies to you?    Yes.  I have been here within the last 3 years, and my medical history has NOT changed since that time.  I am FEMALE/of child-bearing potential.    REVIEW OF SYSTEMS:  Have you recently had or currently have any of the following? No changes in my recent health.   PAST MEDICAL HISTORY:  Have you personally ever had or currently have any of the following?  If \"YES,\" then please provide more detail. No changes in my medical history.   HISTORY OF IMMUNOSUPPRESSION: Do you have a history of any of the following:  Systemic Immunosuppression such as Diabetes, Biologic or Immunotherapy, Chemotherapy, Organ Transplantation, Bone Marrow Transplantation or Prednisone?  No     Answering \"YES\" requires the addition of the dotphrase \"IMMUNOSUPPRESSED\" as the first diagnosis of the patient's visit.   FAMILY HISTORY:  Any \"first degree relatives\" (parent, brother, sister, or child) with the following?    No changes in my family's known health.   PATIENT EXPERIENCE:    Do you want the Dermatologist to perform a COMPLETE skin exam today including a clinical examination under the \"bra and underwear\" areas?  NO  If necessary, do we have your permission to call and leave a detailed message on your Preferred Phone number that includes your specific medical information?  Yes      No Known Allergies   Current Outpatient Medications:     acetaminophen (TYLENOL) 325 mg tablet, Take 650 mg by mouth every 6 (six) hours as needed for mild pain, Disp: , Rfl:     Cetirizine HCl (ZYRTEC ALLERGY PO), Take by mouth if needed, Disp: , Rfl:     desonide (DESOWEN) 0.05 % cream, Apply topically 2 (two) times a day for 10 days, Disp: 15 g, Rfl: 1    hydrocortisone 2.5 % ointment, Apply on its own to upper and lower eyelids twice a " day for 3 days only, then combine with Elidel and apply twice a day for 2 days only, then stop., Disp: 30 g, Rfl: 1    ibuprofen (MOTRIN) 400 mg tablet, Take by mouth every 6 (six) hours as needed for mild pain, Disp: , Rfl:     ketoconazole (NIZORAL) 2 % shampoo, Use as shampoo at least 3 times per week to scalp. Lather into scalp and let sit for 5 minutes before rinsing. Can use as face wash., Disp: 100 mL, Rfl: 10    omeprazole (PriLOSEC) 40 MG capsule, TAKE 1 CAPSULE BY MOUTH EVERY DAY, Disp: 90 capsule, Rfl: 1    Opzelura 1.5 % CREA, Apply to affected areas of upper and lower eyelids twice a day., Disp: 60 g, Rfl: 2    pimecrolimus (ELIDEL) 1 % cream, Combine with hydrocortisone 2.5% ointment and apply twice a day to upper and lower eyelids for 2 days only, then apply on its own twice a day for the next 5 days or until rash resolves. Then use as needed., Disp: 30 g, Rfl: 2    rizatriptan (Maxalt) 10 mg tablet, Take 1 tablet (10 mg total) by mouth as needed for migraine Take at the onset of migraine; if symptoms continue or return, may take another dose at least 2 hours after first dose. Take no more than 2 doses in a day., Disp: 10 tablet, Rfl: 6          Whom besides the patient is providing clinical information about today's encounter?   NO ADDITIONAL HISTORIAN (patient alone provided history)    Physical Exam and Assessment/Plan by Diagnosis:    DERMATITIS:  Atopic Dermatitis or Contact Dermatitis, Seborrheic dermatitis less likely      Physical Exam:  Anatomic Location Affected &  Morphological Description:  normal appearing skin on the upper and lower eyelids, no flaking throughout scalp, well demarcated erythematous patches in bilateral axilla with minor overlying excoriation on patch in left axilla.         Additional History of Present Condition:  Patient presents as a follow up. The ketoconazole 2% shampoo did help her face and scalp. Patient is still using Opzelura twice daily on the upper and lower  eyelids but states if she misses a day or two her eyelids, her eyelids start to get dry and flake.  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 She thinks it may have been due to the dust. Patient is additionally concerned about a rash in the armpits. It appeared this past week. It was red and itchy. She has been applying the Opzelura on this as well which has somewhat helped. She has been using a Secret brand deodorant which she has been using for years. Further, patient did not have patch testing done yet.     Assessment and Plan:  Based on a thorough discussion of this condition and the management approach to it (including a comprehensive discussion of the known risks, side effects and potential benefits of treatment), the patient (family) agrees to implement the following specific plan:     Start Hydrocortisone 2.5% ointment twice a day for up to two weeks to affected areas of underarms. Can use twice a day for up to 1 week on upper and lower eyelids if flaring.   Continue Opzelura 1.5% cream twice a day to affected areas of upper and lower eyelids. Can apply twice a day top affected areas of underarms once excoriation has healed.   Continue using CeraVe Gentle gentle facial cleanser.  Discussed changing deodorant to VaniCream brand.   Sent message to prior authorization team for patch testing approval. Advised patient if she does not hear from our office within then next month to call and follow up on this.   Follow-up: 3 months.     Scribe Attestation      I,:  Penelope Alegria MA am acting as a scribe while in the presence of the attending physician.:       I,:  Madhuri Adler PA-C personally performed the services described in this documentation    as scribed in my presence.:

## 2024-11-12 ENCOUNTER — TELEPHONE (OUTPATIENT)
Dept: BEHAVIORAL/MENTAL HEALTH CLINIC | Facility: CLINIC | Age: 24
End: 2024-11-12

## 2024-11-12 NOTE — TELEPHONE ENCOUNTER
Called and left message for patient to inform 12/4 @ 1pm NP appointment  was cancelled due to provider schedule change. Requested return call to reschedule. Please transfer to Psych Support team for scheduling.

## 2024-11-19 NOTE — TELEPHONE ENCOUNTER
Becky returned call from Support Services. Deenar karen transferred to  to get rescheduled for NP appt.

## 2024-11-25 ENCOUNTER — TELEPHONE (OUTPATIENT)
Dept: PSYCHIATRY | Facility: CLINIC | Age: 24
End: 2024-11-25

## 2024-11-25 NOTE — TELEPHONE ENCOUNTER
One week follow up call for New Patient appointment with   ANSELMO Bourne   on 12/05/2024 was made on 11/25/2024. Writer informed patient of New Patient paperwork needing to be completed 5 days prior to the appointment. Writer confirmed paperwork has been sent via [employer].    Appointment was made on: 11/19/2024

## 2024-12-02 ENCOUNTER — OFFICE VISIT (OUTPATIENT)
Dept: PHYSICAL THERAPY | Facility: OTHER | Age: 24
End: 2024-12-02
Payer: COMMERCIAL

## 2024-12-02 ENCOUNTER — TELEPHONE (OUTPATIENT)
Dept: NEUROLOGY | Facility: CLINIC | Age: 24
End: 2024-12-02

## 2024-12-02 DIAGNOSIS — M54.2 CERVICALGIA: Primary | ICD-10-CM

## 2024-12-02 PROCEDURE — 97140 MANUAL THERAPY 1/> REGIONS: CPT

## 2024-12-02 PROCEDURE — 97110 THERAPEUTIC EXERCISES: CPT

## 2024-12-02 PROCEDURE — 97112 NEUROMUSCULAR REEDUCATION: CPT

## 2024-12-02 NOTE — PROGRESS NOTES
"Daily Note     Today's date: 2024  Patient name: Becky Hong  : 2000  MRN: [id number]  Referring provider: Teddy Valadez DO  Dx:   Encounter Diagnosis     ICD-10-CM    1. Cervicalgia  M54.2                    Total treatment time 3967-6565, 1-on-  Subjective: \"I have been feeling good. I was working in [place] and didn't have the best shoes and I was feeling a lot of pain into my shoulder blade. I feel like everything we are doing is really helping my pain, I think I just didn't have the right shoes on. I used to be in pain all the time and now I really only have pain if I sleep weird or if I am working my body a lot.\"      Objective: See treatment diary below      Assessment: Session focused on manual work for symptom relief and cervical end range extension, and RTC and scapular strengthening. Tolerated treatment well without presence of symptoms, though noted difficulty with most exercises on L UE due to weakness. Will continue to progress in future visits as tolerated.       Plan: Continue per plan of care.      Precautions: anxiety  [id number]  POC expires Unit limit Auth Expiration date PT/OT + Visit Limit?   2024 BOMN N/a BOMN PCY4                             Manuals   FOTO: 48 2 - 9 3 -  4 -  - 10/14  FOTO: 68 6 - 10/24 7 -  8 -    STM AB cervical paraspinals, B upper traps   KA cervical paraspinals, B upper traps KA cervical paraspinals, B upper traps KA cervical paraspinals, B upper traps  KA cervical paraspinals, B upper traps   Cervical traction retraction extension        KA 10x    Cervical joint mobs     Side glide and extension KA grIII-IV Side glide and extension KA grIII-IV Side glide and extension KA grIII-IV     P-A mob AB C4-C6 grI          Cupping  3'; cervical paraspinals and R mid trap 3'; L UT and cervical paraspinals  3'; L UT and cervical paraspinals                   Neuro Re-Ed           No monies 2x10 OTB    HEP  2x10 OTB    " "With chin retraction GTB 5\"x20    Cervical SNAG 20x    HEP  2x10        Cervical AROM 20x ea    HEP With cups 3' With cups 3'          Cervical retraction Seated 2x10    HEP Seated 2x10 Seated 2x10        JUAN PABLO nv 2x10    HEP 2x10        Diaphragmatic breathing nv 1'; 4 ct inhale, 6 ct exhale 1'; 4 ct inhale, 6 ct exhale        Nerve glides nv Ulnar 15x Ulnar 15x  15x ea ulnar, radial, median 15x ea ulnar, radial, median     Rows / LPD      GTB 2x10 ea  GTB 2x10 ea  3x10 GTB    Repeated cervical retraction extension      10x  10x2 2x10 with wiggle 2x10 with wiggle   Wall slides         10\"x10   Prone YTI         nv   Cervical retraction with lift       Supine 2x10  nv   Ther Ex           UBE   Retro 5' Retro 5' Retro 6'  Retro 5' Retro 5'   RTC lift        OTB 2x8 B OTB 2x10 B    Wall walks        OTB 10x B                                                           Ther Activity           education Anxiety/stress management, TMJ pathology, HEP, dx                     Gait Training                                 Modalities                                                "

## 2024-12-02 NOTE — TELEPHONE ENCOUNTER
LMOM for pt to inform them that their appt on 2/3 the location was changed to . Gave pt the address. We can r/s pt to a date in [facility] if they prefer, call center reach out to me if needed for r/s. Or they are okay to be put as a VV for this visit and next appt can be in-person at the preferred location. Gave pt c/b #.

## 2024-12-05 ENCOUNTER — OFFICE VISIT (OUTPATIENT)
Dept: BEHAVIORAL/MENTAL HEALTH CLINIC | Facility: CLINIC | Age: 24
End: 2024-12-05
Payer: COMMERCIAL

## 2024-12-05 DIAGNOSIS — F42.9 OBSESSIVE-COMPULSIVE DISORDER, UNSPECIFIED TYPE: ICD-10-CM

## 2024-12-05 DIAGNOSIS — F41.1 GAD (GENERALIZED ANXIETY DISORDER): Primary | ICD-10-CM

## 2024-12-05 PROCEDURE — 90791 PSYCH DIAGNOSTIC EVALUATION: CPT | Performed by: COUNSELOR

## 2024-12-05 NOTE — PSYCH
Behavioral Health Psychotherapy Assessment    Date of Initial Psychotherapy Assessment: 12/05/24  Referral Source: Self referred (recommended by PCP)  Has a release of information been signed for the referral source? No    Preferred Name: Becky Hong  Preferred Pronouns: She/her  YOB: 2000 Age: 24 y.o.  Sex assigned at birth: female   Gender Identity: Female  Race:   Preferred Language: English    Emergency Contact:  Full Name: Marii Alcala  Relationship to Client: Mother  Contact information: [phone]    Primary Care Physician:  SHERI Barnett  [address] [place] [address]  [phone]  Has a release of information been signed? No    Physical Health History:  Past surgical procedures: Jachin Teeth,Hernia removal at infancy  Do you have a history of any of the following: none   Do you have any mobility issues? No    Relevant Family History:  Grandmother-Depression (2019) maternal  Grandfather  Dementia    Presenting Problem (What brings you in?)  Anxiety, Feeling  difficulty with being true self    Mental Health Advance Directive:  Do you currently have a Mental Health Advance Directive?no    Diagnosis:   Diagnosis ICD-10-CM Associated Orders   1. POWER (generalized anxiety disorder)  F41.1       2. Obsessive-compulsive disorder, unspecified type  F42.9           Initial Assessment:     Current Mental Status:    Appearance: appropriate      Behavior/Manner: cooperative      Affect/Mood:  Anxious and stable    Speech:  Normal    Sleep:  Insomnia    Oriented to: oriented to self, oriented to place and oriented to time       Clinical Symptoms    Anxiety: yes      Depression Symptoms: restlessness and irritable      Anxiety Symptoms: excessive worry, fatigues easily, muscle tension, irritable, fear of losing control, nervous/anxious, difficulty controlling worry, restlessness, chest tightness, shortness of breath and hot flashes      Have you ever been  assaultive to others or the environment: No      Have you ever been self-injurious: No      Counseling History:  Previous Counseling or Treatment  (Mental Health or Drug & Alcohol): Yes    Previous Counseling Details:  College counseling program  2 sessions,  [facility] Help 3 sessions  Have you previously taken psychiatric medications: No      Suicide Risk Assessment  Have you ever had a suicide attempt: No    Have you had incidents of suicidal ideation: No    Are you currently experiencing suicidal thoughts: No      Substance Abuse/Addiction Assessment:  Alcohol: No    Heroin: No    Fentanyl: No    Opiates: No    Cocaine: No    Amphetamines: No    Hallucinogens: No    Club Drugs: No    Benzodiazepines: No    Other Rx Meds: No    Marijuana: No    Tobacco/Nicotine: No    Have you experienced blackouts as a result of substance use: No    Have you had any periods of abstinence: No    Have you experienced symptoms of withdrawal: No    Have you ever overdosed on any substances?: No    Are you currently using any Medication Assisted Treatment for Substance Use: No      Disordered Eating History:  Do you have a history of disordered eating: No      Social Determinants of Health:    SDOH:  None    Trauma and Abuse History:    Have you ever been abused: No      Legal History:    Have you ever been arrested  or had a DUI: No      Have you been incarcerated: No      Are you currently on parole/probation: No      Any current Children and Youth involvement: No      Any pending legal charges: No      Relationship History:    Current marital status: single      Natural Supports:  Mother and father    Employment History    Are you currently employed: Yes      Longest period of employment:  7 months    Employer/ Job title:  [employer]    Future work goals:  Masters or PHD better job (Engineering)    Sources of income/financial support:  Work     History:      Status: no history of  duty  Educational  History:     Highest level of education:  Bachelor's Degree    Have you ever had an IEP or 504-plan: No      Do you need assistance with reading or writing: No      Recommended Treatment:     Psychotherapy:  Individual sessions    Frequency:  2 times    Session frequency:  Monthly      Visit start and stop times:    12/05/24  Start Time: 1200  Stop Time: 1255  Total Visit Time: 55 minutes

## 2024-12-05 NOTE — BH TREATMENT PLAN
Outpatient Behavioral Health Psychotherapy Treatment Plan    Becky Hong  2000     Date of Initial Psychotherapy Assessment: 12/05/24   Date of Current Treatment Plan: 12/05/24  Treatment Plan Target Date: 6/1/25  Treatment Plan Expiration Date: 6/1//25    Diagnosis:   1. POWER (generalized anxiety disorder)            Area(s) of Need: Managing anxiety better and feeling like I can be my authentic self    Long Term Goal 1 (in the client's own words): Reduce overall frequency, intensity, and duration of the anxiety so that daily functioning is not impaired.    Stage of Change: Contemplation    Target Date for completion: TBD     Anticipated therapeutic modalities: CBT, Motivational interviewing, Client centered     People identified to complete this goal: Colt Pena      Objective 1: (identify the means of measuring success in meeting the objective): Identify and implement 5 new calming skills to reduce anxiety and anxiety symptoms      Objective 2: (identify the means of measuring success in meeting the objective): Identify, challenge, and replace fearful self-talk with positive realistic and empowering self-talk.      Long Term Goal 2 (in the client's own words): Learn to be less of a people pleaser    Stage of Change: Contemplation    Target Date for completion: TBD     Anticipated therapeutic modalities: CBT, Motivational interviewing, Client centered     People identified to complete this goal: Colt Pena      Objective 1: (identify the means of measuring success in meeting the objective): Verbalize an increased awareness of negative self talk      Objective 2: (identify the means of measuring success in meeting the objective): Increase the frequency of speaking up with confidence in social situations.    Long Term Goal 3 (in the client's own words): Reduce the frequency, intensity, and duration of obsessions.    Stage of Change: Contemplation    Target Date for completion:  TBD     Anticipated therapeutic modalities: CBT, Motivational interviewing, Client centered     People identified to complete this goal: Becky Therapist      Objective 1: (identify the means of measuring success in meeting the objective): Verbalize an understanding of OCD and the rationale for its treatment.      Objective 2: (identify the means of measuring success in meeting the objective): Learn cognitive coping strategies to manage obsessions.     I am currently under the care of a [facility] psychiatric provider: no    My [facility] psychiatric provider is: NA    I am currently taking psychiatric medications: No    I feel that I will be ready for discharge from mental health care when I reach the following (measurable goal/objective): I am able to manage my feelings of anxiety and cope where it does not consume my life.    For children and adults who have a legal guardian:   Has there been any change to custody orders and/or guardianship status? NA. If yes, attach updated documentation.    I have created my Crisis Plan and have been offered a copy of this plan    Behavioral Health Treatment Plan [facility]: Diagnosis and Treatment Plan explained to Becky Hong acknowledges an understanding of their diagnosis. Becky Hong agrees to this treatment plan.    I have been offered a copy of this Treatment Plan. yes

## 2024-12-05 NOTE — BH CRISIS PLAN
Client Name: Becky Hong       Client YOB: 2000    [employer] Safety Plan      Creation Date: 12/5/24       Step 1: Warning Signs:   Warning Signs   Get feelings of not being able to breathe   Cant get mind to stop            Step 2: Internal Coping Strategies:   Internal Coping Strategies   Take beep breaths   hold  breaths shortly   Diaphramic breathing   Positive self talk   Going to a less noisey place            Step 3: People and social settings that provide distraction:   Name Contact Information   Mother    Father    Watching comfort TV     Places   outside           Step 4: People whom I can ask for help during a crisis:      Name Contact Information    MOther     Father       Step 5: Professionals or agencies I can contact during a crisis:      Clinican/Agency Name Phone Emergency Contact    [facility]        Local Emergency Department Emergency Department Phone Emergency Department Address    911          Crisis Phone Numbers:   Suicide Prevention Lifeline: Call or Text  769 Crisis Text Line: Text HOME to 008-852   Please note: Some [facility] do not have a separate number for Child/Adolescent specific crisis. If your county is not listed under Child/Adolescent, please call the adult number for your county      Adult Crisis Numbers: Child/Adolescent Crisis Numbers   [facility]: [phone] [facility]: [phone]   [facility]: [phone] [facility]: [phone]   [facility]: [phone] [place]: [phone]   [facility]: [phone] [place]: [phone]   [facility]: [phone]   [facility]: [phone]   [facility]: [phone]   [facility]: [phone] (daytime) [phone] (after hours, weekends, holidays)      Step 6: Making the environment safer (plan for lethal means safety):      Optional: What is most important to me and worth living for?      [employer] Safety Plan.  Niru Farmer and Cr Palma. Used with permission of the authors.

## 2024-12-09 ENCOUNTER — OFFICE VISIT (OUTPATIENT)
Dept: DERMATOLOGY | Facility: CLINIC | Age: 24
End: 2024-12-09
Payer: COMMERCIAL

## 2024-12-09 DIAGNOSIS — L30.9 DERMATITIS: Primary | ICD-10-CM

## 2024-12-09 PROCEDURE — 95044 PATCH/APPLICATION TESTS: CPT | Performed by: STUDENT IN AN ORGANIZED HEALTH CARE EDUCATION/TRAINING PROGRAM

## 2024-12-09 NOTE — PROGRESS NOTES
PATCH TEST DAY 1-NURSE VISIT ONLY    Assessment and Plan:  Area of body affected: upper and lower eyelids   Prior treatment: ketoconazole, opzelura, hydrocortisone   Indication for patch test: rule out contact allergy     The indication for Patch Testing is to test all patients in whom contact allergy is suspected or needs to be ruled out, regardless of age or anatomical site of dermatitis. [facility] provides all tools required to perform patch tests the IQ way - the gold standard for diagnosing contact allergy. In order to perform a diagnostic Patch Test, two crucial components are required; Topical Haptens and Patch Test Units. Topical Haptens The hapten preparations used in patch testing should ideally be specifically developed for patch testing purposes. The Topical Haptens manufactured by [employer] are standardized and prepared by mixing high purity fine particle ground raw material with an appropriate vehicle, such as high purity grade white petrolatum, using state of the art technology. Patch Test Units To ensure that the hapten remains in direct contact with the skin for the time required (48h) to create a standardized controlled reaction, a Patch Test Unit is needed. A Patch Test Unit is composed of sets of chambers mounted on an adhesive tape. The purpose of the patch test chambers is to provide a defined areai in which the skin will be exposed to the haptens during the testing.  Recommended no showering, sweating or excessive moisture as this reduces the effectiveness of the test.  Also, no oral steroids and do not apply topical steroids to the testing field.   The patient understands that they must come to the clinic on Monday to have the patches placed, Wednesday to have the patches removed and an initial read performed,  and Friday of the testing week for the final reading.     PROCEDURE: PATCH TEST APPLICATION (North American 80 Comprehensive Series NAC-80)-Temporarily Unavailable  #36-(mx-16),#48 (mx-30),#72-(L-003)    Total number of patches applied: 8 individual patches    8 patches with 10 haptens in each patch was placed on patient back and a surgical marker was used to appropriately cyndi notches on skin. Hypafix was applied over patches.        PATIENT INSTRUCTIONS     After your patch tests are applied, you will need to return in two days (48 hours) to have your patch-test sites read. The appointments should have been made at the time your initial appointment was scheduled.        Please do not take a bath or get your back wet until after you have completed all your patch test visits. Do not get your back wet between the time the patch tests are removed and the time of your final visit.     Please do not take part in any strenuous activities that will loosen the tape on your back or cause you to perspire heavily.    Any medication altering the immune system (e.g cortisone and antihistamines) cannot be used during the test period.     If the tape becomes loose, it may be reinforced with a medical tape from your home.    Don’t expose your back to sunlight for the duration of the patch test.    Do resist the urge to scratch your back during the test     You may want to wear an OLD undershirt to prevent the adhesives and/or patch test substances from sticking to or staining your clothes during and after the patch tests are removed.     Please call the patch test nurse at: [phone] to report any reactions occurring after your final patch test appointment.

## 2024-12-11 ENCOUNTER — OFFICE VISIT (OUTPATIENT)
Dept: DERMATOLOGY | Facility: CLINIC | Age: 24
End: 2024-12-11

## 2024-12-11 DIAGNOSIS — L30.9 DERMATITIS: Primary | ICD-10-CM

## 2024-12-11 PROCEDURE — NC001 PR NO CHARGE: Performed by: STUDENT IN AN ORGANIZED HEALTH CARE EDUCATION/TRAINING PROGRAM

## 2024-12-11 NOTE — PROGRESS NOTES
PATCH TEST DAY 2: NURSE VISIT ONLY    Physical Exam  Observation that tape stayed on correctly: YES  Itching or burning where allergens were placed: YES  Photo is taken to reassure patch placement       Additional History of Present Condition:  Patient presents for patch test day 2. She notes itching on her back     Assessment and Plan:  Based on a thorough discussion of this condition and the management approach to it (including a comprehensive discussion of the known risks, side effects and potential benefits of treatment), the patient (family) agrees to implement the following specific plan:  Patches and tape were removed, marking were identified and darkened with surgical marking pen, photo was taken , all patient questions were answered

## 2024-12-13 ENCOUNTER — OFFICE VISIT (OUTPATIENT)
Dept: DERMATOLOGY | Facility: CLINIC | Age: 24
End: 2024-12-13
Payer: COMMERCIAL

## 2024-12-13 VITALS — WEIGHT: 124 LBS | BODY MASS INDEX: 19.46 KG/M2 | HEIGHT: 67 IN | TEMPERATURE: 97.7 F

## 2024-12-13 DIAGNOSIS — L24.81 IRRITANT CONTACT DERMATITIS DUE TO METALS: Primary | ICD-10-CM

## 2024-12-13 PROCEDURE — 99213 OFFICE O/P EST LOW 20 MIN: CPT | Performed by: NURSE PRACTITIONER

## 2024-12-13 NOTE — PROGRESS NOTES
"[facility] Dermatology Clinic Note     Patient Name: Becky Hong  Encounter Date: 12/13/2024     Have you been cared for by a [facility] Dermatologist in the last 3 years and, if so, which description applies to you?    Yes.  I have been here within the last 3 years, and my medical history has NOT changed since that time.  I am FEMALE/of child-bearing potential.    REVIEW OF SYSTEMS:  Have you recently had or currently have any of the following? No changes in my recent health.   PAST MEDICAL HISTORY:  Have you personally ever had or currently have any of the following?  If \"YES,\" then please provide more detail. No changes in my medical history.   HISTORY OF IMMUNOSUPPRESSION: Do you have a history of any of the following:  Systemic Immunosuppression such as Diabetes, Biologic or Immunotherapy, Chemotherapy, Organ Transplantation, Bone Marrow Transplantation or Prednisone?  No     Answering \"YES\" requires the addition of the dotphrase \"IMMUNOSUPPRESSED\" as the first diagnosis of the patient's visit.   FAMILY HISTORY:  Any \"first degree relatives\" (parent, brother, sister, or child) with the following?    No changes in my family's known health.   PATIENT EXPERIENCE:    Do you want the Dermatologist to perform a COMPLETE skin exam today including a clinical examination under the \"bra and underwear\" areas?  NO  If necessary, do we have your permission to call and leave a detailed message on your Preferred Phone number that includes your specific medical information?  Yes      No Known Allergies   Current Outpatient Medications:     acetaminophen (TYLENOL) 325 mg tablet, Take 650 mg by mouth every 6 (six) hours as needed for mild pain, Disp: , Rfl:     Cetirizine HCl (ZYRTEC ALLERGY PO), Take by mouth if needed, Disp: , Rfl:     desonide (DESOWEN) 0.05 % cream, Apply topically 2 (two) times a day for 10 days, Disp: 15 g, Rfl: 1    hydrocortisone 2.5 % ointment, Apply to affected areas of underarms twice a day for " up to 2 weeks. Can apply to upper and lower eyelids twice a day for up to 1 week when flaring ONLY., Disp: 30 g, Rfl: 0    ibuprofen (MOTRIN) 400 mg tablet, Take by mouth every 6 (six) hours as needed for mild pain, Disp: , Rfl:     omeprazole (PriLOSEC) 40 MG capsule, TAKE 1 CAPSULE BY MOUTH EVERY DAY, Disp: 90 capsule, Rfl: 1    Opzelura 1.5 % CREA, Apply to affected areas of upper and lower eyelids twice a day., Disp: 60 g, Rfl: 2    rizatriptan (Maxalt) 10 mg tablet, Take 1 tablet (10 mg total) by mouth as needed for migraine Take at the onset of migraine; if symptoms continue or return, may take another dose at least 2 hours after first dose. Take no more than 2 doses in a day., Disp: 10 tablet, Rfl: 6    ketoconazole (NIZORAL) 2 % shampoo, Use as shampoo at least 3 times per week to scalp. Lather into scalp and let sit for 5 minutes before rinsing. Can use as face wash. (Patient not taking: Reported on 11/11/2024), Disp: 100 mL, Rfl: 10    pimecrolimus (ELIDEL) 1 % cream, Combine with hydrocortisone 2.5% ointment and apply twice a day to upper and lower eyelids for 2 days only, then apply on its own twice a day for the next 5 days or until rash resolves. Then use as needed. (Patient not taking: Reported on 11/11/2024), Disp: 30 g, Rfl: 2          Whom besides the patient is providing clinical information about today's encounter?   NO ADDITIONAL HISTORIAN (patient alone provided history)    Physical Exam and Assessment/Plan by Diagnosis:    PATCH TEST DAY 3      Patch Positive  During this previous week, the patient was patch tested to the TRUE TEST.  Upon review, the patient had positive reactions to:     Cell Number 20: +++ Nickel (II) sulfate hexahydrate (N-002A, N-002B)            Assessment and Plan:  Positive allergen: Nickel  Added positive allergen to allergy list  Referral to Allergist for further testing  Continue Hydrocortisone to affected area as needed    We explained the interpretation of the  results to the patient and provided the patient with a semi-comprehensive list of appropriate products that they could use, while avoiding the allergens most effectively.  Once again, we explained that the patch testing was only a test, and that the best outcome for the patient, would happen only if they adhered to the results found today.      Patient was provided information sheets regarding the common and not so common locations of each positive allergen, which should be avoided. Any products that goes on patient's skin should be carefully reviewed, and if the chemicals or their potential cross-reactors (listed) are present, then these products should not be used.       Scribe Attestation      I,:  Yesica Adame MA am acting as a scribe while in the presence of the attending physician.:       I,:  SHERI Esposito personally performed the services described in this documentation    as scribed in my presence.:

## 2024-12-17 ENCOUNTER — OFFICE VISIT (OUTPATIENT)
Dept: PHYSICAL THERAPY | Facility: OTHER | Age: 24
End: 2024-12-17
Payer: COMMERCIAL

## 2024-12-17 DIAGNOSIS — M54.2 CERVICALGIA: Primary | ICD-10-CM

## 2024-12-17 PROCEDURE — 97110 THERAPEUTIC EXERCISES: CPT | Performed by: PEDIATRICS

## 2024-12-17 PROCEDURE — 97112 NEUROMUSCULAR REEDUCATION: CPT | Performed by: PEDIATRICS

## 2024-12-17 NOTE — PROGRESS NOTES
"Daily Note     Today's date: 2024  Patient name: Becky Hong  : 2000  MRN: [id number]  Referring provider: Teddy Valadez DO  Dx:   Encounter Diagnosis     ICD-10-CM    1. Cervicalgia  M54.2                    Total treatment time 1740-  Subjective: \"I was at my computer more today. I tried to remind myself to have good posture but I would get lost in what I was doing and find myself with forward posture throughout the day.\"      Objective: See treatment diary below      Assessment: Session focused on manual work for symptom relief and cervical end range extension, and RTC and scapular strengthening. Patient tolerated treatment well. Cervical paraspinal and bilateral upper trap tightness noted with loosening post manual interventions.       Plan: Continue per plan of care.      Precautions: anxiety  [id number]  POC expires Unit limit Auth Expiration date PT/OT + Visit Limit?   2024 BOMN N/a BOMN PCY4                             Manuals   FOTO: 48 2 - 9/4 3 - 9/16 4 - 9/30 5 - 10/14  FOTO: 68 6 - 10/24 7 -  8 -  -    STM AB cervical paraspinals, B upper traps   KA cervical paraspinals, B upper traps KA cervical paraspinals, B upper traps KA cervical paraspinals, B upper traps  KA cervical paraspinals, B upper traps EW cervical paraspinals, B upper traps   Cervical traction retraction extension        KA 10x  KA 10x   Cervical joint mobs     Side glide and extension KA grIII-IV Side glide and extension KA grIII-IV Side glide and extension KA grIII-IV      P-A mob AB C4-C6 grI           Cupping  3'; cervical paraspinals and R mid trap 3'; L UT and cervical paraspinals  3'; L UT and cervical paraspinals                     Neuro Re-Ed            No monies 2x10 OTB    HEP  2x10 OTB    With chin retraction GTB 5\"x20     Cervical SNAG 20x    HEP  2x10         Cervical AROM 20x ea    HEP With cups 3' With cups 3'           Cervical retraction Seated 2x10    HEP Seated 2x10 " "Seated 2x10         JUAN PABLO nv 2x10    HEP 2x10         Diaphragmatic breathing nv 1'; 4 ct inhale, 6 ct exhale 1'; 4 ct inhale, 6 ct exhale         Nerve glides nv Ulnar 15x Ulnar 15x  15x ea ulnar, radial, median 15x ea ulnar, radial, median      Rows / LPD      GTB 2x10 ea  GTB 2x10 ea  3x10 GTB  3x10 GTB   Repeated cervical retraction extension      10x  10x2 2x10 with wiggle 2x10 with wiggle    Wall slides         10\"x10 10\"x10   Prone YTI         nv    Cervical retraction with lift       Supine 2x10  nv    Ther Ex            UBE   Retro 5' Retro 5' Retro 6'  Retro 5' Retro 5' Retro 5'   RTC lift        OTB 2x8 B OTB 2x10 B  OTB 2x10 B    Wall walks        OTB 10x B  OTB 10x B                                                               Ther Activity            education Anxiety/stress management, TMJ pathology, HEP, dx                       Gait Training                                    Modalities                                                   "

## 2024-12-19 ENCOUNTER — TELEPHONE (OUTPATIENT)
Age: 24
End: 2024-12-19

## 2024-12-19 NOTE — TELEPHONE ENCOUNTER
Patient is calling regarding cancelling an appointment.    Date/Time: 12/19/24    Reason: Conflicting schedule    Patient was rescheduled: YES [x] NO [x]  If yes, when was Patient reschedule for: Next appt on 1/2/25    Patient requesting call back to reschedule: YES [] NO [x]

## 2024-12-30 ENCOUNTER — EVALUATION (OUTPATIENT)
Dept: PHYSICAL THERAPY | Facility: OTHER | Age: 24
End: 2024-12-30
Payer: COMMERCIAL

## 2024-12-30 DIAGNOSIS — M54.2 CERVICALGIA: Primary | ICD-10-CM

## 2024-12-30 PROCEDURE — 97164 PT RE-EVAL EST PLAN CARE: CPT

## 2024-12-30 PROCEDURE — 97140 MANUAL THERAPY 1/> REGIONS: CPT

## 2024-12-30 PROCEDURE — 97112 NEUROMUSCULAR REEDUCATION: CPT

## 2024-12-30 NOTE — PROGRESS NOTES
"PT Re-Evaluation     Today's date: 2024  Patient name: Becky Hong  : 2000  MRN: [id number]  Referring provider: Teddy Valadez DO  Dx:   Encounter Diagnosis     ICD-10-CM    1. Cervicalgia  M54.2                    Total treatment time 4661-5426, 1-on-1 2247-0010  Subjective: \"I had a sinus infection over the holiday so I was having some pain in my neck and having headaches. I feel better now though.\"  Pain  Current pain ratin  At best pain ratin  At worst pain ratin  Location: midline cervical spine, surrounding musculature, and B shoulders  Quality: throbbing and pressure  Relieving factors: relaxation, rest, ice and heat  Exacerbated by: work-related activities, bending, sitting at the computer for a while  Progression: improved      Objective: See treatment diary below    Cervical/Thoracic Comments  Reflexes 2+ triceps, biceps, brachioradialis B  Myotomes: 2+ all B  Sharp Alex (-)  VBI: (-) B  Alar ligament: (-) B    Palpation: TTP B upper traps, B cervical paraspinals, R periscapular, C4-C6  Observation: FHP, rounded shoulders, mandible protrusion    P-A pressure C4-C6 hypomobile, p! (N/t today)    Cervical AROM  Flex: full, \"tension,\" p! L side with sustained flexion (improved)  Ext: 25% rhodes, no p! (Improved)  R rot: 0% rhodes, no p! (Improved)  L rot: 0% rhodes, \"tension, limited range\" (improved)   RSB: full, \"tension\" L clavicle (improved)  LSB: full, no pain (improved)    Spurlings (-) B (improved)  Compression (+) n/t  Distraction (+) n/t    ULTT  Median nerve (+) R (improved L)  Ulnar nerve (-) B (improved)    Radial nerve (-) B (improved)          Assessment:     RE 2024 details: Pt reports 65-75% improvement since initial evaluation. She reports she used to be in constant pain, and now it is every so often or of it gets flared from activity. Aggravating factors continue to be work and sleeping, as she wakes in the morning with pain. She is very conscious of her " posture, which helps, but she states sometimes holding tension with good posture for long periods of time is aggravating. Alleviating factors include doing PT exercises, especially nerve glides, no monies, and chin tucks, as well as other stretches and stress management. The pain in her neck no longer wakes her up in the middle of the night. In regards to her headaches, she used to get them 3x a week, now only once a week and does not take regular meds for it, only Tylenol as necessary. Her headaches are now in the back of her head. She notices this aggravation with changes in weather, and work-related stress, which she feels she is able to manage on her own better. She gets numbness and tingling in her L arm and hand about once or twice a month and is aggravated by laying on her arm. She sometimes has jaw pain but is better than at initial evaluation. Aggravating factors for her jaw include types of food, stress, and if her headaches and neck pain are already present. Objectively, patient has near full ROM in all directions with minimal to no pain. All ULTT was negative today except L median nerve. Plan for PT to continue including postural control, DNF training, scap activation, and RTC strengthening. Pt would benefit from skilled physical therapy services in order to address current deficits, restore PLOF, and improve QOL.       IE 8/22/2024 details: Becky Hong is a pleasant 24 y.o. female who presents with neck pain.  Patient's greatest concerns are worry over not knowing what's wrong and wanting to avoid painkillers.  Pt is present for neck issues that have arose over the past couple of months. She started having migraines in 2022 which started more central at the top of her head, but recently have moved into the back of the head and into her shoulders. She feels this recent onset could be the start of a new job recently and built up stress and anxiety. Her neurologist that she sees for her migraines  "recommended PT. They also perform a brain MRI which showed a cavernoma, she will have a second MRI to rule out red flags. Excedrin helps her headaches but has not taken Maxalt yet as prescribed. She gets migraines about 3x a week, especially towards the end of her day, and feels they can be a result of the tightness in her neck and shoulders. Currently her pain is at the base of her neck 1/10 and reports it as \"someone grabbing or pressing,\" throbbing, and digging into her shoulderblades. She has noticed her neck pain wakes her in the middle of the night, but has decreased since getting a new pillow that allows her to lay more flat instead of propped. She sleeps on her L side or back. Other aggravating factors include bending down, anxiety stressors, turning head or body, looking up for sustained period, looking down at work for sustained period. Alleviating factors include Voltaren, ice, heat, new pillow, and stretching. Denies previous injury to the neck. 5 Ds and 3 Ns: R eye feels strained sometimes, she feels disoriented if she turns her head fast, and sometimes gets tingling in her hands (she thinks related to her anxiety). Additionally, patient states she has had jaw pain since high school, as well as cracking, especially with yawning, chewing gum, and opening her mouth wide. 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 Pt goals for therapy are to learn different skills to do throughout the day relieve tension, and better manage symptoms.       Objectively, the patient displayed limitations in cervical mobility and ROM along with increased symptom sensitivity to movement and palpation. The primary movement problem is poor posture resulting in pain, muscle imbalances, and headaches and limiting her ability to work without symptoms. Etiologic factors include repetitive poor body mechanics, and increased anxiety/stress due to starting a new job.  Pt may be experiencing these symptoms and " objective findings based on clinical presentation of cervical pain with mobility deficits, with radiculopathy, and with headaches, with possible TMJ involvement. No further referral is necessary at this time based upon examination results. Pt would benefit from skilled physical therapy services to address current deficits, improve quality of life, and restore PLOF with transition to home exercise program when appropriate. I expect she will increase cervical mobility/ROM and improve her posture while reporting decreased pain levels throughout the course of skilled PT.  Positive prognostic indicators include positive attitude toward recovery, good understanding of diagnosis and treatment plan options, and absence of observed red flags.  Negative prognostic indicators include chronicity of symptoms, anxiety, and high symptom irritability.       Primary Impairments:  1) poor posture  2) decreased cervical mobility  3) decreased cervical ROM  4) poor ability to manage stress and anxiety  5) increased upper limb tension B     Function Based Goals:  Patient will be independent with HEP upon discharge. - met  Patient will be able to independently manage symptoms upon discharge. - met  Patient will resume prior level of function and home ADLs with minimal to no symptoms upon discharge. - met  Patient will be able to sleep through the night with minimal to no symptoms upon discharge. - met  Patient will be able to work a full work day with minimal to no symptoms upon discharge. - partially met  Patient will be able to drive with minimal to no symptoms upon discharge. - partially met    Impairment Based Goals:  Patient will increase cervical ROM to 10% limited as noted by therapist in 3 weeks. - met  Patient will increase cervical mobility as noted by therapist in 3 weeks. - met  Patient will increase postural awareness/control to good in 4 weeks. - met  Patient will demonstrate negative ULTT as noted by therapist in 4 weeks.  "- partially met          Patient Goals  Patient goals for therapy: increased strength, return to sport/leisure activities, decreased pain, increased motion, improved balance and independence with ADLs/IADLs  Patient goal: learn different skills to do throughout the day relieve tension, better manage symptoms        Plan  Patient would benefit from: skilled physical therapy  Referral necessary: No  Planned modality interventions: cryotherapy, electrical stimulation/Russian stimulation, thermotherapy: hydrocollator packs, TENS, low level laser therapy and traction    Planned therapy interventions: abdominal trunk stabilization, activity modification, balance, body mechanics training, breathing training, coordination, flexibility, functional ROM exercises, home exercise program, therapeutic exercise, therapeutic activities, stretching, strengthening, postural training, patient education, neuromuscular re-education, motor coordination training, massage, manual therapy, joint mobilization and nerve gliding    Frequency: Every other week  Duration in weeks: 12  Treatment plan discussed with: patient  Plan details: 1x every other wk over the next 3 months with HEP         Precautions: anxiety  [id number]  POC expires Unit limit Auth Expiration date PT/OT + Visit Limit?   3/24/25 BOMN N/a BOMN PCY4                             Manuals 5 - 10/14  FOTO: 68 6 - 10/24 7 - 11/11 8 - 12/2 9 - 12/17 10 - 12/30  FOTO: 69   STM KA cervical paraspinals, B upper traps KA cervical paraspinals, B upper traps  KA cervical paraspinals, B upper traps EW cervical paraspinals, B upper traps KA cervical paraspinals, B upper traps   UT / LS stretch      KA R   Cervical traction retraction extension    KA 10x  KA 10x    Cervical joint mobs  Side glide and extension KA grIII-IV Side glide and extension KA grIII-IV       P-A mob         Cupping                  Neuro Re-Ed         No monies   With chin retraction GTB 5\"x20   With chin retraction BTB " "5\"x20   Cervical SNAG         Cervical AROM         Cervical retraction         JUAN PABLO         Diaphragmatic breathing         Nerve glides 15x ea ulnar, radial, median 15x ea ulnar, radial, median       Rows / LPD  GTB 2x10 ea  GTB 2x10 ea  3x10 GTB  3x10 GTB    Repeated cervical retraction extension  10x  10x2 2x10 with wiggle 2x10 with wiggle     Wall slides     10\"x10 10\"x10 10\"x10 OTB   Prone YTI     nv  nv   Cervical retraction with lift   Supine 2x10  nv  Supine 2x10x5\"   Ther Ex         UBE Retro 6'  Retro 5' Retro 5' Retro 5' Retro standing 6'   RTC lift    OTB 2x8 B OTB 2x10 B  OTB 2x10 B  nv   Wall walks    OTB 10x B  OTB 10x B nv                                                Ther Activity         education                  Gait Training                           Modalities                                            "

## 2025-01-02 ENCOUNTER — SOCIAL WORK (OUTPATIENT)
Dept: BEHAVIORAL/MENTAL HEALTH CLINIC | Facility: CLINIC | Age: 25
End: 2025-01-02
Payer: COMMERCIAL

## 2025-01-02 DIAGNOSIS — F41.1 GAD (GENERALIZED ANXIETY DISORDER): Primary | ICD-10-CM

## 2025-01-02 PROCEDURE — 90791 PSYCH DIAGNOSTIC EVALUATION: CPT | Performed by: COUNSELOR

## 2025-01-02 NOTE — PSYCH
behavioral Health Psychotherapy Progress Note    Psychotherapy Provided: Individual Psychotherapy     1. POWER (generalized anxiety disorder)            Goals addressed in session: Goal 1, Goal 2, and Goal 3      DATA: Met with Becky for an individual therapy session.  We spent the first portion of the session checking and concerning how she has been doing.  She shared that overall she has been doing well over the holidays since she had some time away from work.  She did share however that she was somewhat struggling with anxious thoughts surrounding illness related to some individuals near her that had recently had colds or contracted Covid-19.  She shared how she had a hard time quieting her mind concerning these worries and was tending to avoid others because of this.  We reviewed strategies for facing her fears as well as implementing a set worry time.  We spent some time discussing healthy lifestyle choices and strategies for the slow implementation into her daily routine versus maintaining the mindset that she will change things after completing other important priorities.  We also spent some time discussing some stresses related to her mother's tendency to take on more than she is capable of and then building resentment towards others that do not help.  Becky was able to acknowledge that she has some similar characteristics.  We spent time role playing ways in which she can assert herself better at work.  During this session, this clinician used the following therapeutic modalities: Cognitive Behavioral Therapy, Cognitive Processing Therapy, Motivational Interviewing, and Solution-Focused Therapy    Substance Abuse was not addressed during this session. If the client is diagnosed with a co-occurring substance use disorder, please indicate any changes in the frequency or amount of use: NA. Stage of change for addressing substance use diagnoses: No substance use/Not applicable    ASSESSMENT:  Becky JACKSON  "Gely presents with a Euthymic/ normal mood.     her affect is Normal range and intensity, which is congruent, with her mood and the content of the session. The client has made progress on their goals.    She was able to  acknowledge that she can become annoyed with family members. Becky Hong presents with a none risk of suicide, none risk of self-harm, and none risk of harm to others.    For any risk assessment that surpasses a \"low\" rating, a safety plan must be developed.    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 At the next session, the therapist will use Cognitive Behavioral Therapy and Motivational Interviewing to address Asserting boundaries.    Behavioral Health Treatment Plan and Discharge Planning: Becky Hong is aware of and agrees to continue to work on their treatment plan. They have identified and are working toward their discharge goals. yes    Depression Follow-up Plan Completed: Not applicable    Visit start and stop times:    01/02/25  Start Time: 1200  Stop Time: 1253  Total Visit Time: 53 minutes  "

## 2025-01-13 ENCOUNTER — APPOINTMENT (OUTPATIENT)
Dept: PHYSICAL THERAPY | Facility: OTHER | Age: 25
End: 2025-01-13
Payer: COMMERCIAL

## 2025-01-20 ENCOUNTER — APPOINTMENT (OUTPATIENT)
Dept: PHYSICAL THERAPY | Facility: OTHER | Age: 25
End: 2025-01-20
Payer: COMMERCIAL

## 2025-01-23 ENCOUNTER — OFFICE VISIT (OUTPATIENT)
Dept: PHYSICAL THERAPY | Facility: OTHER | Age: 25
End: 2025-01-23
Payer: COMMERCIAL

## 2025-01-23 ENCOUNTER — TELEPHONE (OUTPATIENT)
Age: 25
End: 2025-01-23

## 2025-01-23 DIAGNOSIS — M54.2 CERVICALGIA: Primary | ICD-10-CM

## 2025-01-23 PROCEDURE — 97112 NEUROMUSCULAR REEDUCATION: CPT | Performed by: PEDIATRICS

## 2025-01-23 PROCEDURE — 97110 THERAPEUTIC EXERCISES: CPT | Performed by: PEDIATRICS

## 2025-01-23 NOTE — PROGRESS NOTES
"Daily Note     Today's date: 2025  Patient name: Becky Hong  : 2000  MRN: [id number]  Referring provider: Teddy Valadez DO  Dx:   Encounter Diagnosis     ICD-10-CM    1. Cervicalgia  M54.2                    Total treatment time 0474-9168, 1-on-1 with Gabby Manzanares PTA for entirety of session.   Subjective: \"I feel pretty good, I have only gotten a little bit of pain. It is definitely getting better.\"      Objective: See treatment diary below      Assessment: Session focused on neural mobility, thoracic mobility, and addition of rhythmic stabilization. Tolerated treatment well, reported some symptoms during [place], but diminished upon stopping. Will continue to progress in future visits as tolerated.      Plan: Continue per plan of care.      Precautions: anxiety  [id number]  POC expires Unit limit Auth Expiration date PT/OT + Visit Limit?   3/24/25 BOMN N/a BOMN PCY4                             Manuals 5 - 10/14  FOTO: 68 6 - 10/24 7 - 11/11 8 - 12/2 9 - 12/17 10 - 12/30  FOTO: 69    STM KA cervical paraspinals, B upper traps KA cervical paraspinals, B upper traps  KA cervical paraspinals, B upper traps EW cervical paraspinals, B upper traps KA cervical paraspinals, B upper traps    UT / LS stretch      KA R    Cervical traction retraction extension    KA 10x  KA 10x     Cervical joint mobs  Side glide and extension KA grIII-IV Side glide and extension KA grIII-IV        P-A mob       Thoracic P-A grIII-IV   Central glider       L median n KA central 10x   Cupping                    Neuro Re-Ed          No monies   With chin retraction GTB 5\"x20   With chin retraction BTB 5\"x20    Cervical SNAG          Cervical AROM          Cervical retraction          JUAN PABLO          Diaphragmatic breathing          Nerve glides 15x ea ulnar, radial, median 15x ea ulnar, radial, median     B median n tensioner 15x    B ulnar n tensioner 15x    FR protocol        1' ea   Ball on wall        Red 20 " "circles ea, ABCx1 B   Body blade        Elbow flex at side 20\"x3 B   Rows / LPD  GTB 2x10 ea  GTB 2x10 ea  3x10 GTB  3x10 GTB     Repeated cervical retraction extension  10x  10x2 2x10 with wiggle 2x10 with wiggle      Wall slides     10\"x10 10\"x10 10\"x10 OTB    Prone YTI     nv  nv    Cervical retraction with lift   Supine 2x10  nv  Supine 2x10x5\"    Ther Ex          UBE Retro 6'  Retro 5' Retro 5' Retro 5' Retro standing 6' Retro standing 6'   RTC lift    OTB 2x8 B OTB 2x10 B  OTB 2x10 B  nv    Wall walks    OTB 10x B  OTB 10x B nv                                                      Ther Activity          education                    Gait Training                              Modalities                                                 "

## 2025-01-23 NOTE — TELEPHONE ENCOUNTER
Patient is calling regarding cancelling an appointment.    Date/Time: 1/23/25 at 12.    Reason: Pt has a project at work and can not leave for her appt.    Patient was rescheduled: YES [x] NO []  If yes, when was Patient reschedule for: Pt already has another appt on 2/6/25 at 12.    Patient requesting call back to reschedule: YES [] NO [x]

## 2025-02-03 ENCOUNTER — OFFICE VISIT (OUTPATIENT)
Dept: PHYSICAL THERAPY | Facility: OTHER | Age: 25
End: 2025-02-03
Payer: COMMERCIAL

## 2025-02-03 DIAGNOSIS — M54.2 CERVICALGIA: Primary | ICD-10-CM

## 2025-02-03 PROCEDURE — 97112 NEUROMUSCULAR REEDUCATION: CPT

## 2025-02-03 PROCEDURE — 97110 THERAPEUTIC EXERCISES: CPT

## 2025-02-03 PROCEDURE — 97140 MANUAL THERAPY 1/> REGIONS: CPT

## 2025-02-03 NOTE — PROGRESS NOTES
"Daily Note     Today's date: 2/3/2025  Patient name: Becky Hong  : 2000  MRN: [id number]  Referring provider: Teddy Valadez DO  Dx:   Encounter Diagnosis     ICD-10-CM    1. Cervicalgia  M54.2                    Total treatment time 3662-2505, 1-[id number]  Subjective: \"I was pretty sore after last time in my neck and my L shoulder, I had some trouble getting comfortable trying to go to sleep.\"      Objective: See treatment diary below      Assessment: Session continued to focus on light neural tensioners and rhythmic stab of B shoulders. Held on central gliders, as this may have cause some discomfort last time. Will assess response to treatment nv and continue to progress as tolerated. Fatigue noted at end of session with body blade. Begin to add in light CKC and Pball YTI nv as long as symptoms remain nil.       Plan: Continue per plan of care.      Precautions: anxiety  [id number]  POC expires Unit limit Auth Expiration date PT/OT + Visit Limit?   3/24/25 BOMN N/a BOMN PCY4                             Manuals 5 - 10/14  FOTO: 68 6 - 10/24 7 -  8 - 12/2 9 - 12/17 10 - 12/30  FOTO: 69  - 2/3   STM KA cervical paraspinals, B upper traps KA cervical paraspinals, B upper traps  KA cervical paraspinals, B upper traps EW cervical paraspinals, B upper traps KA cervical paraspinals, B upper traps  KA UT   UT / LS stretch      KA R     Cervical traction retraction extension    KA 10x  KA 10x      Cervical joint mobs  Side glide and extension KA grIII-IV Side glide and extension KA grIII-IV         P-A mob       Thoracic P-A grIII-IV    Central glider       L median n KA central 10x    Cupping           Nerve glides         KA L ulnar and median 20x ea    Neuro Re-Ed           No monies   With chin retraction GTB 5\"x20   With chin retraction BTB 5\"x20     Cervical SNAG           Cervical AROM           Cervical retraction           JUAN PABLO           Diaphragmatic breathing           Nerve " "glides 15x ea ulnar, radial, median 15x ea ulnar, radial, median     B median n tensioner 15x    B ulnar n tensioner 15x     FR protocol        1' ea 1' ea   Ball on wall        Red 20 circles ea, ABCx1 B Red 20 circles ea, ABCx1 B   Body blade        Elbow flex at side 20\"x3 B Elbow flex at side, shoulder flex to 90 elbow straight, 20\"x3 B   Rows / LPD  GTB 2x10 ea  GTB 2x10 ea  3x10 GTB  3x10 GTB      Repeated cervical retraction extension  10x  10x2 2x10 with wiggle 2x10 with wiggle       Wall slides     10\"x10 10\"x10 10\"x10 OTB     Prone YTI     nv  nv     Cervical retraction with lift   Supine 2x10  nv  Supine 2x10x5\"     Ther Ex           UBE Retro 6'  Retro 5' Retro 5' Retro 5' Retro standing 6' Retro standing 6' Retro standing 6'   RTC lift    OTB 2x8 B OTB 2x10 B  OTB 2x10 B  nv     Wall walks    OTB 10x B  OTB 10x B nv                                                            Ther Activity           education                      Gait Training                                 Modalities                                                      "

## 2025-02-06 ENCOUNTER — TELEMEDICINE (OUTPATIENT)
Dept: BEHAVIORAL/MENTAL HEALTH CLINIC | Facility: CLINIC | Age: 25
End: 2025-02-06

## 2025-02-06 DIAGNOSIS — F41.1 GAD (GENERALIZED ANXIETY DISORDER): Primary | ICD-10-CM

## 2025-02-06 NOTE — PSYCH
"Behavioral Health Psychotherapy Progress Note    Psychotherapy Provided: Individual Psychotherapy     1. POWER (generalized anxiety disorder)            Goals addressed in session: Goal 1, Goal 2, and Goal 3      DATA: Met with Becky strong for an individual therapy session.  We spent the first portin of the session checking in concerning how she has been doing.  She shared that she has been feeling better than previously.  She shared that she has started to use a notebook to track some of her thoughts, because she often struggles with remembering the things that cause her anxious feelings.  She shared some of the thoughts that she had written in her journal.  She was able to express that she is able to be productive at work and will often run around \"Like a chicken with its head cut off\". She acknowledged some of her perfectionistic tendency and was challenged concerning being realistic with her expectations for herself.  She was also able to identify that when she comes home from work that she tends to spend an excessive amount of time on social media. We worked on distinguishing when a positive coping skill can turn into something negative and Identifying more positive coping strategies as well as the importance of limits  During this session, this clinician used the following therapeutic modalities: Cognitive Processing Therapy and Solution-Focused Therapy    Substance Abuse was not addressed during this session. If the client is diagnosed with a co-occurring substance use disorder, please indicate any changes in the frequency or amount of use: NA. Stage of change for addressing substance use diagnoses: No substance use/Not applicable    ASSESSMENT:  Becky Hong presents with a Euthymic/ normal mood.     her affect is Normal range and intensity and Blunted, which is congruent, with her mood and the content of the session. The client has made progress on their goals.    Becky was able to identify " "two things she plans to work on limiting over the next two weeks. Becky Hong presents with a none risk of suicide, none risk of self-harm, and none risk of harm to others.    For any risk assessment that surpasses a \"low\" rating, a safety plan must be developed.    A safety plan was indicated: no  If yes, describe in detail NA    PLAN: Between sessions, Becky Hong will work on instituting a bedtime and cut off electronics 30 minutes prior to bed.  She also plans to work on waking up earlier in the morning to give more time to prepare for her day.. At the next session, the therapist will use Cognitive Behavioral Therapy and Solution-Focused Therapy to address Improving sleep Hygiene.    Behavioral Health Treatment Plan and Discharge Planning: Becky Hong is aware of and agrees to continue to work on their treatment plan. They have identified and are working toward their discharge goals. yes    Depression Follow-up Plan Completed: Not applicable    Visit start and stop times:    02/06/25  Start Time: 1200  Stop Time: 1253  Total Visit Time: 53 minutes  "

## 2025-02-17 ENCOUNTER — APPOINTMENT (OUTPATIENT)
Dept: PHYSICAL THERAPY | Facility: OTHER | Age: 25
End: 2025-02-17
Payer: COMMERCIAL

## 2025-02-17 NOTE — PROGRESS NOTES
"Daily Note     Today's date: 2025  Patient name: Becky Hong  : 2000  MRN: [id number]  Referring provider: Teddy Valadez DO  Dx:   No diagnosis found.                 Subjective: \"I was pretty sore after last time in my neck and my L shoulder, I had some trouble getting comfortable trying to go to sleep.\"      Objective: See treatment diary below      Assessment: Session continued to focus on light neural tensioners and rhythmic stab of B shoulders. Held on central gliders, as this may have cause some discomfort last time. Will assess response to treatment nv and continue to progress as tolerated. Fatigue noted at end of session with body blade. Begin to add in light CKC and Pball YTI nv as long as symptoms remain nil.       Plan: Continue per plan of care.      Precautions: anxiety  [id number]  POC expires Unit limit Auth Expiration date PT/OT + Visit Limit?   3/24/25 BOMN N/a BOMN PCY4                             Manuals 5 - 10/14  FOTO: 68 6 - 10/24 7 -  8 -  - 12/17 10 - 12/30  FOTO: 69  12 - 2/3   STM KA cervical paraspinals, B upper traps KA cervical paraspinals, B upper traps  KA cervical paraspinals, B upper traps EW cervical paraspinals, B upper traps KA cervical paraspinals, B upper traps  KA UT   UT / LS stretch      KA R     Cervical traction retraction extension    KA 10x  KA 10x      Cervical joint mobs  Side glide and extension KA grIII-IV Side glide and extension KA grIII-IV         P-A mob       Thoracic P-A grIII-IV    Central glider       L median n KA central 10x    Cupping           Nerve glides         KA L ulnar and median 20x ea    Neuro Re-Ed           No monies   With chin retraction GTB 5\"x20   With chin retraction BTB 5\"x20     Cervical SNAG           Cervical AROM           Cervical retraction           JUAN PABLO           Diaphragmatic breathing           Nerve glides 15x ea ulnar, radial, median 15x ea ulnar, radial, median     B median n tensioner " "15x    B ulnar n tensioner 15x     FR protocol        1' ea 1' ea   Ball on wall        Red 20 circles ea, ABCx1 B Red 20 circles ea, ABCx1 B   Body blade        Elbow flex at side 20\"x3 B Elbow flex at side, shoulder flex to 90 elbow straight, 20\"x3 B   Rows / LPD  GTB 2x10 ea  GTB 2x10 ea  3x10 GTB  3x10 GTB      Repeated cervical retraction extension  10x  10x2 2x10 with wiggle 2x10 with wiggle       Wall slides     10\"x10 10\"x10 10\"x10 OTB     Prone YTI     nv  nv     Cervical retraction with lift   Supine 2x10  nv  Supine 2x10x5\"     Ther Ex           UBE Retro 6'  Retro 5' Retro 5' Retro 5' Retro standing 6' Retro standing 6' Retro standing 6'   RTC lift    OTB 2x8 B OTB 2x10 B  OTB 2x10 B  nv     Wall walks    OTB 10x B  OTB 10x B nv                                                            Ther Activity           education                      Gait Training                                 Modalities                                                      "

## 2025-02-19 ENCOUNTER — TELEPHONE (OUTPATIENT)
Dept: NEUROLOGY | Facility: CLINIC | Age: 25
End: 2025-02-19

## 2025-02-19 NOTE — TELEPHONE ENCOUNTER
LMOM for pt to confirm their   8:30a  appt on  2/24   w/ Valadez . Reminded pt to arrive 15 mins prior to appt to check in with . Gave call back number [phone] to cancel/reschedule.

## 2025-02-20 ENCOUNTER — SOCIAL WORK (OUTPATIENT)
Dept: BEHAVIORAL/MENTAL HEALTH CLINIC | Facility: CLINIC | Age: 25
End: 2025-02-20

## 2025-02-20 ENCOUNTER — OFFICE VISIT (OUTPATIENT)
Dept: FAMILY MEDICINE CLINIC | Facility: HOSPITAL | Age: 25
End: 2025-02-20
Payer: COMMERCIAL

## 2025-02-20 VITALS
HEIGHT: 67 IN | OXYGEN SATURATION: 99 % | SYSTOLIC BLOOD PRESSURE: 113 MMHG | TEMPERATURE: 97.8 F | BODY MASS INDEX: 19.74 KG/M2 | DIASTOLIC BLOOD PRESSURE: 72 MMHG | HEART RATE: 77 BPM | WEIGHT: 125.8 LBS

## 2025-02-20 DIAGNOSIS — Z23 ENCOUNTER FOR IMMUNIZATION: ICD-10-CM

## 2025-02-20 DIAGNOSIS — K21.9 GASTROESOPHAGEAL REFLUX DISEASE, UNSPECIFIED WHETHER ESOPHAGITIS PRESENT: ICD-10-CM

## 2025-02-20 DIAGNOSIS — Z00.00 ANNUAL PHYSICAL EXAM: Primary | ICD-10-CM

## 2025-02-20 DIAGNOSIS — F41.1 GAD (GENERALIZED ANXIETY DISORDER): Primary | ICD-10-CM

## 2025-02-20 PROCEDURE — 99395 PREV VISIT EST AGE 18-39: CPT | Performed by: INTERNAL MEDICINE

## 2025-02-20 PROCEDURE — 90471 IMMUNIZATION ADMIN: CPT

## 2025-02-20 PROCEDURE — 90656 IIV3 VACC NO PRSV 0.5 ML IM: CPT

## 2025-02-20 RX ORDER — LANOLIN ALCOHOL/MO/W.PET/CERES
1000 CREAM (GRAM) TOPICAL DAILY
COMMUNITY

## 2025-02-20 NOTE — PATIENT INSTRUCTIONS
"Patient Education     Routine physical for adults   The Basics   Written by the doctors and editors at [facility]   What is a physical? -- A physical is a routine visit, or \"check-up,\" with your doctor. You might also hear it called a \"wellness visit\" or \"preventive visit.\"  During each visit, the doctor will:   Ask about your physical and mental health   Ask about your habits, behaviors, and lifestyle   Do an exam   Give you vaccines if needed   Talk to you about any medicines you take   Give advice about your health   Answer your questions  Getting regular check-ups is an important part of taking care of your health. It can help your doctor find and treat any problems you have. But it's also important for preventing health problems.  A routine physical is different from a \"sick visit.\" A sick visit is when you see a doctor because of a health concern or problem. Since physicals are scheduled ahead of time, you can think about what you want to ask the doctor.  How often should I get a physical? -- It depends on your age and health. In general, for people age 21 years and older:   If you are younger than 50 years, you might be able to get a physical every 3 years.   If you are 50 years or older, your doctor might recommend a physical every year.  If you have an ongoing health condition, like diabetes or high blood pressure, your doctor will probably want to see you more often.  What happens during a physical? -- In general, each visit will include:   Physical exam - The doctor or nurse will check your height, weight, heart rate, and blood pressure. They will also look at your eyes and ears. They will ask about how you are feeling and whether you have any symptoms that bother you.   Medicines - It's a good idea to bring a list of all the medicines you take to each doctor visit. Your doctor will talk to you about your medicines and answer any questions. Tell them if you are having any side effects that bother you. You " "should also tell them if you are having trouble paying for any of your medicines.   Habits and behaviors - This includes:   Your diet   Your exercise habits   Whether you smoke, drink alcohol, or use drugs   Whether you are sexually active   Whether you feel safe at home  Your doctor will talk to you about things you can do to improve your health and lower your risk of health problems. They will also offer help and support. For example, if you want to quit smoking, they can give you advice and might prescribe medicines. If you want to improve your diet or get more physical activity, they can help you with this, too.   Lab tests, if needed - The tests you get will depend on your age and situation. For example, your doctor might want to check your:   Cholesterol   Blood sugar   Iron level   Vaccines - The recommended vaccines will depend on your age, health, and what vaccines you already had. Vaccines are very important because they can prevent certain serious or deadly infections.   Discussion of screening - \"Screening\" means checking for diseases or other health problems before they cause symptoms. Your doctor can recommend screening based on your age, risk, and preferences. This might include tests to check for:   Cancer, such as breast, prostate, cervical, ovarian, colorectal, prostate, lung, or skin cancer   Sexually transmitted infections, such as chlamydia and gonorrhea   Mental health conditions like depression and anxiety  Your doctor will talk to you about the different types of screening tests. They can help you decide which screenings to have. They can also explain what the results might mean.   Answering questions - The physical is a good time to ask the doctor or nurse questions about your health. If needed, they can refer you to other doctors or specialists, too.  Adults older than 65 years often need other care, too. As you get older, your doctor will talk to you about:   How to prevent falling at " home   Hearing or vision tests   Memory testing   How to take your medicines safely   Making sure that you have the help and support you need at home  All topics are updated as new evidence becomes available and our peer review process is complete.  This topic retrieved from [employer] on: May 02, 2024.  Topic 940382 Version 1.0  Release: 32.4.3 - C32.122  © 2024 UpToDate, Inc. and/or its affiliates. All rights reserved.  Consumer Information Use and Disclaimer   Disclaimer: This generalized information is a limited summary of diagnosis, treatment, and/or medication information. It is not meant to be comprehensive and should be used as a tool to help the user understand and/or assess potential diagnostic and treatment options. It does NOT include all information about conditions, treatments, medications, side effects, or risks that may apply to a specific patient. It is not intended to be medical advice or a substitute for the medical advice, diagnosis, or treatment of a health care provider based on the health care provider's examination and assessment of a patient's specific and unique circumstances. Patients must speak with a health care provider for complete information about their health, medical questions, and treatment options, including any risks or benefits regarding use of medications. This information does not endorse any treatments or medications as safe, effective, or approved for treating a specific patient. UpToDate, Inc. and its affiliates disclaim any warranty or liability relating to this information or the use thereof.The use of this information is governed by the Terms of Use, available at https://www.woltersNutmeguwer.com/en/know/clinical-effectiveness-terms. 2024© UpToDate, Inc. and its affiliates and/or licensors. All rights reserved.  Copyright   © 2024 Up[employer]Date, Inc. and/or its affiliates. All rights reserved.    Patient Education     Diet and health   The Basics   Written by the doctors and  "editors at [employer]   Why is it important to eat a healthy diet? -- It's important to eat a healthy diet because eating the right foods can keep you healthy now and later in life. It can lower the risk of problems like heart disease, diabetes, high blood pressure, and some types of cancer. It can also help you live longer and improve your quality of life.  What kind of diet is best? -- There is no 1 specific diet that experts recommend for everyone. People choose what foods to eat for many different reasons. These include personal preference, culture, Taoism, allergies or intolerances, and nutritional goals. People also need to consider the cost and availability of different foods.  In general, experts recommend a diet that:   Includes lots of vegetables, fruits, beans, nuts, and whole grains   Limits red and processed meats, unhealthy fats, sugar, salt, and alcohol  What are dietary patterns? -- A dietary \"pattern\" means generally eating certain types of foods while limiting others. Some people need to follow a specific dietary pattern because of their health needs. For example, if you have high blood pressure, your doctor might recommend a diet low in salt.  If you are trying to improve your health in general, choosing a healthy dietary pattern can help. This does not have to mean being very strict about what you eat or avoid. The goal is to think about getting plenty of healthy foods while limiting less healthy foods.  Examples of dietary patterns include:   Mediterranean diet - This involves eating a lot of fruits, vegetables, nuts, and whole grains, and uses olive oil instead of other fats. It also includes some fish, poultry, and dairy products, but not a lot of red meat. Following this diet can help your overall health, and might even lower your risk of having a stroke.   Plant-based diets - These patterns focus on vegetables, fruits, grains, beans, and nuts. They limit or avoid food that comes from " "animals, such as meat and dairy. There are different types of plant-based diets, including vegetarian and vegan.   Low-fat diet - A low-fat diet involves limiting calories from fat. This might help some people keep weight off if that is their goal, but it does not have many other health benefits. If you choose to follow a low-fat diet, it is also important to focus on getting lots of whole grains, legumes, fruits, and vegetables. Limit refined grains and sugar.   Low-cholesterol diet - Cholesterol is found in foods with a lot of saturated fat, like red meat, butter, and cheese. A low-cholesterol diet focuses on limiting the amount of cholesterol that you eat. Limiting the cholesterol in your diet can also help lower the amount of unhealthy fats that you eat.  Which foods are especially healthy? -- Foods that are especially healthy include:   Fruits and vegetables - Eating a diet with lots of fruits and vegetables can help prevent heart disease and stroke. It might also help prevent certain types of cancer. Try to eat fruits and vegetables at each meal and also for snacks. If you don't have fresh fruits and vegetables available, you can eat frozen or canned ones instead. Doctors recommend eating at least 5 servings of fruits or vegetables each day.   Whole grains - Whole-grain foods include 100 percent whole-wheat bread, steel cut oats, and whole-grain pasta. These are healthier than foods made with \"refined\" grains, like white bread and white rice. Eating lots of whole grains instead of refined grains has been shown to help with weight control. It can also lower the risk of several health problems, including colon cancer, heart disease, and diabetes. Doctors recommend that most people try to eat 5 to 8 servings of whole-grain, high-fiber foods each day.   Foods with fiber - Eating foods with a lot of fiber can help prevent heart disease and stroke. If you have type 2 diabetes, it can also help control your blood " "sugar. Foods that have a lot of fiber include vegetables, fruits, beans, nuts, oatmeal, and whole-grain breads and cereals. You can tell how much fiber is in a food by reading the nutrition label (figure 1). Doctors recommend that most people eat about 25 to 34 grams of fiber each day.   Foods with calcium and vitamin D - Babies, children, and adults need calcium and vitamin D to help keep their bones strong. Adults also need calcium and vitamin D to help prevent osteoporosis. Osteoporosis is a condition that causes bones to get \"thin\" and break more easily than usual. Different foods and drinks have calcium and vitamin D in them (figure 2). People who don't get enough calcium and vitamin D in their diet might need to take a supplement. Doctors recommend that most people have 2 to 3 servings of foods with calcium and vitamin D each day.   Foods with protein - Protein helps your muscles and bones stay strong. Healthy foods with a lot of protein include chicken, fish, eggs, beans, nuts, and soy products. Red meat also has a lot of protein, but it also contains fats, which can be unhealthy. Doctors recommend that most people try to eat about 5 servings of protein each day.   Healthy fats - There are different types of fats. Some types of fats are better for your body than others. Healthy fats are \"monounsaturated\" or \"polyunsaturated\" fats. These are found in fatty fish, nuts and nut butters, and avocados. Use plant-based oils when cooking. Examples of these oils include olive, canola, safflower, sunflower, and corn oil. Eating foods with healthy fats, while avoiding or limiting foods with unhealthy fats, might lower the risk of heart disease.   Foods with folate - Folate is a vitamin that is important for pregnant people, since it helps prevent certain birth defects. It is also called \"folic acid.\" Anyone who could get pregnant should get at least 400 micrograms of folic acid daily, whether or not they are actively " "trying to get pregnant. Folate is found in many breakfast cereals, oranges, orange juice, and green leafy vegetables.  What foods should I avoid or limit? -- To eat a healthy diet, there are some things that you should avoid or limit. They include:   Unhealthy fats - \"Trans\" fats are especially unhealthy. They are found in margarines, many fast foods, and some store-bought baked goods. \"Saturated\" fats are found in animal products like meats, egg yolks, butter, cheese, and full-fat milk products. Unhealthy fats can raise your cholesterol level and increase your chance of getting heart disease.   Sugar - To have a healthy diet, it's important to limit or avoid added sugar, sweets, and refined grains. Refined grains are found in white bread, white rice, most pastas, and most packaged \"snack\" foods.  Avoiding sugar-sweetened beverages, like soda and sports drinks, can also help improve your health.  Avoid canned fruits in \"heavy\" syrup.   Red and processed meats - Studies have shown that eating a lot of red meat can increase your risk of certain health problems, including heart disease and cancer. You should limit the amount of red meat that you eat. This is also true for processed meats like sausage, hot dogs, and olivo.  Can I drink alcohol as part of a healthy diet? -- Not drinking alcohol at all is the healthiest choice. Regular drinking can raise a person's chances of getting liver disease and certain types of cancers. In females, even 1 drink a day can increase the risk of getting breast cancer.  If you do choose to drink, most doctors recommend limiting alcohol to no more than:   1 drink a day for females   2 drinks a day for males  The limits are different because, generally, the female body takes longer to break down alcohol.  How many calories do I need each day? -- Calories give your body energy. The number of calories that you need each day depends on your weight, height, age, sex, and how active you " "are.  Your doctor or nurse can tell you about how many calories you should eat each day. You can also work with a dietitian (nutrition expert) to learn more about your dietary needs and options.  What if I am having trouble improving my diet? -- It can be hard to change the way that you eat. Remember that even small changes can improve your health.  Here are some tips that might help:   Try to make fruits and vegetables part of every meal. If you don't have fresh fruits and vegetables, frozen or canned are good options. Look for products without added salt or sugar.   Keep a bowl of fruit out for snacking.   When you can, choose whole grains instead of refined grains. Choose chicken, fish, and beans instead of red meat and cheese.   Try to eat prepared and processed foods less often.   Try flavored seltzer or water instead of soda or juice.   When eating at fast food restaurants, look for healthier items, like broiled chicken or salad.  If you have questions about which foods you should or should not eat, ask your doctor, nurse, or dietitian. The right diet for you will depend, in part, on your health and any medical conditions you have.  All topics are updated as new evidence becomes available and our peer review process is complete.  This topic retrieved from [employer] on: Feb 28, 2024.  Topic 37140 Version 28.0  Release: 32.2.4 - C32.58  © 2024 UpToDate, Inc. and/or its affiliates. All rights reserved.  figure 1: Nutrition label - Fiber     This is an example of a nutrition label. To figure out how much fiber is in a food, look for the line that says \"Dietary Fiber.\" It's also important to look at the serving size. This food has 7 grams of fiber in each serving, and each serving is 1 cup.  Graphic 00400 Version 8.0  figure 2: Foods and drinks with calcium and vitamin D     Foods rich in calcium include ice cream, soy milk, breads, kale, broccoli, milk, cheese, cottage cheese, almonds, yogurt, ready-to-eat cereals, " "beans, and tofu. Foods rich in vitamin D include milk, fortified plant-based \"milks\" (soy, almond), canned tuna fish, cod liver oil, yogurt, ready-to-eat-cereals, cooked salmon, canned sardines, mackerel, and eggs. Some of these foods are rich in both.  Graphic 07689 Version 4.0  Consumer Information Use and Disclaimer   Disclaimer: This generalized information is a limited summary of diagnosis, treatment, and/or medication information. It is not meant to be comprehensive and should be used as a tool to help the user understand and/or assess potential diagnostic and treatment options. It does NOT include all information about conditions, treatments, medications, side effects, or risks that may apply to a specific patient. It is not intended to be medical advice or a substitute for the medical advice, diagnosis, or treatment of a health care provider based on the health care provider's examination and assessment of a patient's specific and unique circumstances. Patients must speak with a health care provider for complete information about their health, medical questions, and treatment options, including any risks or benefits regarding use of medications. This information does not endorse any treatments or medications as safe, effective, or approved for treating a specific patient. [employer], Inc[employer] and its affiliates disclaim any warranty or liability relating to this information or the use thereof.The use of this information is governed by the Terms of Use, available at https://www.woltersMetaclouduwer.com/en/know/clinical-effectiveness-terms. 2024© Up[employer]Date, Inc. and its affiliates and/or licensors. All rights reserved.  Copyright   © 2024 Up[employer]Date, Inc. and/or its affiliates. All rights reserved.    Patient Education     Exercise and movement   The Basics   Written by the doctors and editors at [employer]   What are the benefits of movement? -- Moving your body has many benefits. It can:   Burn calories, which helps people " manage their weight   Help control blood sugar levels in people with diabetes   Lower blood pressure, especially in people with high blood pressure   Lower stress, and help with depression and anxiety   Keep bones strong, so they don't get thin and break easily   Lower the chance of dying from heart disease  Adding even small amounts of physical activity to your daily routine can improve your health.  What are the main types of exercise? -- There are 3 main types of exercise:   Aerobic exercise - This raises your heart rate. Examples include walking, running, dancing, riding a bike, and swimming.   Muscle strengthening - This helps make your muscles stronger. You can do it using weights, exercise bands, or weight machines. You can also use your own body weight, as with push-ups, or by lifting items in your home, like jugs of water.   Stretching - These help your muscles and joints move more easily.  It's important to have all 3 types in your exercise program. That way, your body, muscles, and joints can be as healthy as possible.  Should I talk to my doctor or nurse before I start exercising? -- If you have not exercised before or have not exercised in a long time, talk with your doctor or nurse before you start a very active exercise program.  If you have heart disease or risk factors for heart disease (like high blood pressure or diabetes), your doctor or nurse might recommend that you have an exercise test before starting an exercise program.  When you begin an exercise program, start slowly. For example, do the exercise at a slow pace or for a few minutes only. Over time, you can exercise faster and for longer periods of time.  What should I do when I exercise? -- Each time you exercise, you should:   Warm up - This can help keep you from hurting your muscles when you exercise. To warm up, do a light aerobic exercise (such as walking slowly) or stretch for 5 to 10 minutes.   Work out - Try to get a mix of  aerobic exercise, muscle strengthening, and stretching. During an aerobic workout, you can walk fast, swim, run, or use an exercise machine, for example. Other activities, like dancing or playing tennis, are also forms of aerobic exercise. You should also take time to stretch all of your joints, including your neck, shoulders, back, hips, and knees. At least 2 times a week, you can do muscle strengthening exercises as part of your workout.   Cool down - This helps keep you from feeling dizzy after you exercise and helps prevent muscle cramps. To cool down, you can stretch or do a light aerobic exercise for 5 minutes.  Some people go to a gym or do group exercise classes. But you can exercise even without these things. Some exercises can be done even in a small space. You can also try online videos or smartphone apps to get ideas for different types of exercise.  How often should I exercise? -- Doctors recommend that people exercise at least 30 minutes a day, on 5 or more days of the week.  If you can't exercise for 30 minutes straight, try to exercise for 10 minutes at a time, 3 or 4 times a day. Even exercising for shorter amounts of time is good for you, especially if it means spending less time sitting.  When should I call my doctor or nurse? -- If you have any of the following symptoms when you exercise, stop exercising and call your doctor or nurse right away:   Pain or pressure in your chest, arms, throat, jaw, or back   Nausea or vomiting   Feeling like your heart is fluttering or racing very fast   Feeling dizzy or faint  What if I don't have time to exercise? -- Many people have very busy lives and might not think that they have time to exercise. But it's important to try to find time, even if you are tired or work a lot. Exercise can increase your energy level, which can make you feel better and might even help you get more work done.  Even if it's hard to set aside a lot of time to exercise, you can still  improve your health by moving your body more. There are many ways that you can be more active. For example, you can:   Take the stairs instead of the elevator.   Park in a parking space that is farther away from the door.   Take a longer route when you walk from one place to another.  Spending a lot of time sitting still (for example, watching TV or working on the computer) is bad for your health. Try to get up and move around whenever you can. Even small amounts of movement, like taking short walks, doing household chores, or gardening, can improve your health. Finding activities that you enjoy, or doing them with other people, can help you add more movement into your daily life.  What else should I do when I exercise? -- To exercise safely and avoid problems, it's important to:   Drink fluids during and after exercising (but avoid drinks with a lot of caffeine or sugar).   Avoid exercising outside if it is too hot or cold.   Wear layers of clothes, so that you can take them off if you get too hot.   Wear shoes that fit well and support your feet.   Be aware of your surroundings if you exercise outside.  All topics are updated as new evidence becomes available and our peer review process is complete.  This topic retrieved from [employer] on: May 18, 2024.  Topic 97342 Version 31.0  Release: 32.4.3 - C32.137  © 2024 UpToDate, Inc. and/or its affiliates. All rights reserved.  Consumer Information Use and Disclaimer   Disclaimer: This generalized information is a limited summary of diagnosis, treatment, and/or medication information. It is not meant to be comprehensive and should be used as a tool to help the user understand and/or assess potential diagnostic and treatment options. It does NOT include all information about conditions, treatments, medications, side effects, or risks that may apply to a specific patient. It is not intended to be medical advice or a substitute for the medical advice, diagnosis, or  treatment of a health care provider based on the health care provider's examination and assessment of a patient's specific and unique circumstances. Patients must speak with a health care provider for complete information about their health, medical questions, and treatment options, including any risks or benefits regarding use of medications. This information does not endorse any treatments or medications as safe, effective, or approved for treating a specific patient. [employer] and its affiliates disclaim any warranty or liability relating to this information or the use thereof.The use of this information is governed by the Terms of Use, available at https://www.[employer].com/en/know/clinical-effectiveness-terms. 2024© [employer] and its affiliates and/or licensors. All rights reserved.  Copyright   © 2024 [employer], [employer] and/or its affiliates. All rights reserved.

## 2025-02-20 NOTE — ASSESSMENT & PLAN NOTE
Orders:    TSH, 3rd generation with Free T4 reflex    CBC and differential    Comprehensive metabolic panel    Vitamin B12

## 2025-02-20 NOTE — PSYCH
"Behavioral Health Psychotherapy Progress Note    Psychotherapy Provided: Individual Psychotherapy     1. POWER (generalized anxiety disorder)            Goals addressed in session: Goal 1, Goal 2, and Goal 3      DATA: Met with Becky for an individual therapy session.  We spent the first portion of the session checking in concerning how she has been doing.  She shared that she has been struggling with letting go of control with responsibilities at her job when she passes off things to other people.  She shared that she has been having some success with taking steps to prepare for her workday ahead of time to help her mornings go smoother, but that she has not done as good with reducing her screen time.  We spent time reviewing some of Becky's journal tracking of her thoughts.  She was able to share about her struggle with speaking up with her friend to share her feelings about feeling her drinking and pressure she gives to always do what she wants.  We used some role play to practice this and we also reviewed the use of \"I statements\"  During this session, this clinician used the following therapeutic modalities: Client-centered Therapy, Cognitive Behavioral Therapy, and Motivational Interviewing    Substance Abuse was not addressed during this session. If the client is diagnosed with a co-occurring substance use disorder, please indicate any changes in the frequency or amount of use: NA. Stage of change for addressing substance use diagnoses: No substance use/Not applicable    ASSESSMENT:  Becky Hong presents with a Euthymic/ normal mood.     her affect is Normal range and intensity, which is congruent, with her mood and the content of the session. The client has made progress on their goals.    Becky was able to share her thoughts from her thought journal as well as participate in role plan to practice communicating with her friend. Becky Hong presents with a none risk of suicide, none risk " "of self-harm, and none risk of harm to others.    For any risk assessment that surpasses a \"low\" rating, a safety plan must be developed.    A safety plan was indicated: no  If yes, describe in detail NA    PLAN: Between sessions, Becky JACKSON Gely will practice using \"I Statements\" to improve communication. At the next session, the therapist will use Cognitive Behavioral Therapy to address communication.    Behavioral Health Treatment Plan and Discharge Planning: Becky Hong is aware of and agrees to continue to work on their treatment plan. They have identified and are working toward their discharge goals. yes    Depression Follow-up Plan Completed: Not applicable    Visit start and stop times:    02/20/25  Start Time: 1209  Stop Time: 1255  Total Visit Time: 46 minutes  "

## 2025-02-20 NOTE — PROGRESS NOTES
Adult Annual Physical  Name: Becky Hong      : 2000      MRN: [id number]  Encounter Provider: Bharati Ybarra DO  Encounter Date: 2025   Encounter department: [facility] [address]     Assessment & Plan  Annual physical exam    Orders:    TSH, 3rd generation with Free T4 reflex    CBC and differential    Comprehensive metabolic panel    Vitamin B12    Gastroesophageal reflux disease, unspecified whether esophagitis present    Orders:    TSH, 3rd generation with Free T4 reflex    CBC and differential    Comprehensive metabolic panel    Vitamin B12    Immunizations and preventive care screenings were discussed with patient today. Appropriate education was printed on patient's after visit summary.    Counseling:  Alcohol/drug use: discussed moderation in alcohol intake, the recommendations for healthy alcohol use, and avoidance of illicit drug use.  Dental Health: discussed importance of regular tooth brushing, flossing, and dental visits.  Injury prevention: discussed safety/seat belts, safety helmets, smoke detectors, carbon monoxide detectors, and smoking near bedding or upholstery.  Exercise: the importance of regular exercise/physical activity was discussed. Recommend exercise 3-5 times per week for at least 30 minutes.  Cervical cancer screening: PAP  - Dr Christa Quintero       Depression Screening and Follow-up Plan: Patient was screened for depression during today's encounter. They screened negative with a PHQ-2 score of 1.      BW       History of Present Illness     Adult Annual Physical:  Patient presents for annual physical.     Diet and Physical Activity:  - Diet/Nutrition: well balanced diet, limited junk food and consuming 3-5 servings of fruits/vegetables daily.  - Exercise: no formal exercise.    Depression Screening:  - PHQ-2 Score: 1    General Health:  - Sleep: daytime hypersomnolence and sleeps poorly. has some issues faling  asleep  - Hearing: normal hearing bilateral ears.  - Vision: no vision problems, wears glasses and most recent eye exam < 1 year ago.  - Dental: regular dental visits and brushes teeth twice daily.    /GYN Health:  - Follows with GYN: yes.   - Menopause: premenopausal.   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  Neurosurgery note from 9/24 was reviewed. MRI last done 8/24. 1 yr follow up with MRI was recommended. She has seen [facility] for migraines.     Pt has recently started seeing [facility] for her depression and anxiety.  She is not on any medications for her mood. She notes increase in anxiety recently - past year or so.       Review of Systems   Constitutional:  Positive for fatigue. Negative for chills, fever and unexpected weight change.   HENT:  Negative for congestion, hearing loss and trouble swallowing.    Eyes:  Negative for pain and visual disturbance.   Respiratory:  Negative for cough, shortness of breath and wheezing.    Cardiovascular:  Positive for chest pain. Negative for palpitations and leg swelling.        CP associated with anxiety - not new/worse   Gastrointestinal:  Positive for constipation. Negative for abdominal pain, blood in stool, diarrhea, nausea and vomiting.   Genitourinary:  Negative for difficulty urinating and dysuria.   Musculoskeletal:  Positive for neck pain. Negative for back pain.   Skin:  Positive for rash. Negative for wound.        Following with Derm and trying creams for hands and eye lid rash/itching   Neurological:  Positive for headaches. Negative for dizziness and light-headedness.   Hematological:  Does not bruise/bleed easily.   Psychiatric/Behavioral:  Positive for sleep disturbance. The patient is nervous/anxious.          Objective   BP  "113/72 (BP Location: Left arm, Patient Position: Sitting, Cuff Size: Standard)   Pulse 77   Temp 97.8 °F (36.6 °C)   Ht 5' 7\" (1.702 m)   Wt 57.1 kg (125 lb 12.8 oz)   SpO2 99%   BMI 19.70 kg/m²     Physical Exam  Vitals and nursing note reviewed.   Constitutional:       General: She is not in acute distress.     Appearance: She is well-developed. She is not ill-appearing.   HENT:      Head: Normocephalic and atraumatic.      Right Ear: Tympanic membrane and external ear normal. There is no impacted cerumen.      Left Ear: Tympanic membrane and external ear normal. There is no impacted cerumen.      Mouth/Throat:      Mouth: Mucous membranes are moist.      Pharynx: Oropharynx is clear. No oropharyngeal exudate.   Eyes:      General:         Right eye: No discharge.         Left eye: No discharge.      Conjunctiva/sclera: Conjunctivae normal.   Neck:      Thyroid: No thyromegaly.      Trachea: No tracheal deviation.   Cardiovascular:      Rate and Rhythm: Normal rate and regular rhythm.      Heart sounds: Normal heart sounds. No murmur heard.  Pulmonary:      Effort: Pulmonary effort is normal. No respiratory distress.      Breath sounds: Normal breath sounds. No wheezing, rhonchi or rales.   Abdominal:      General: There is no distension.      Palpations: Abdomen is soft.      Tenderness: There is no abdominal tenderness. There is no guarding or rebound.   Musculoskeletal:         General: No deformity or signs of injury.      Cervical back: Neck supple.   Lymphadenopathy:      Cervical: No cervical adenopathy.   Skin:     General: Skin is warm and dry.      Coloration: Skin is not pale.      Findings: Rash present. No bruising.      Comments: Dry irritated skin dorsum B/L hands - some excoriations present   Neurological:      General: No focal deficit present.      Mental Status: She is alert.      Motor: No abnormal muscle tone.      Gait: Gait normal.   Psychiatric:         Mood and Affect: Mood normal.   "       Behavior: Behavior normal.         Thought Content: Thought content normal.         Judgment: Judgment normal.

## 2025-02-24 ENCOUNTER — OFFICE VISIT (OUTPATIENT)
Dept: NEUROLOGY | Facility: CLINIC | Age: 25
End: 2025-02-24
Payer: COMMERCIAL

## 2025-02-24 VITALS
DIASTOLIC BLOOD PRESSURE: 62 MMHG | HEART RATE: 102 BPM | BODY MASS INDEX: 19.32 KG/M2 | SYSTOLIC BLOOD PRESSURE: 112 MMHG | HEIGHT: 67 IN | OXYGEN SATURATION: 98 % | TEMPERATURE: 97.5 F | WEIGHT: 123.1 LBS

## 2025-02-24 DIAGNOSIS — G43.109 MIGRAINE WITH AURA AND WITHOUT STATUS MIGRAINOSUS, NOT INTRACTABLE: Primary | ICD-10-CM

## 2025-02-24 DIAGNOSIS — M54.2 CERVICALGIA: ICD-10-CM

## 2025-02-24 DIAGNOSIS — F41.9 ANXIETY DISORDER: ICD-10-CM

## 2025-02-24 PROCEDURE — 99214 OFFICE O/P EST MOD 30 MIN: CPT | Performed by: STUDENT IN AN ORGANIZED HEALTH CARE EDUCATION/TRAINING PROGRAM

## 2025-02-24 NOTE — PROGRESS NOTES
Since your last visit are your headaches - Improved    Any change to the headache type? No    What is your current headache frequency (total headache days out of 30): 4/30 (of those, how many are more severe: 0)    Are you taking your current medications as prescribed? yes    Do you have any side effects? no    How may days per week do you take an abortive medicine? 0-1/7

## 2025-02-24 NOTE — PROGRESS NOTES
Neurology Ambulatory Visit  Name: Becky Hong       : 2000       MRN: [id number]   Encounter Provider: Teddy Valadez DO   Encounter Date: 2025  Encounter department: NEUROLOGY [facility]    Becky Hong is a delightful 24 y.o. female with a past medical history that includes GERD, POWER here for f/u evaluation of headache.     Assessment and Plan  1. Migraine with aura and without status migrainosus, not intractable  Assessment & Plan:  At today's visit, she reports that she is generally doing well.  She endorses about 4 headache days per month and finds that they are typically not severe.  She is taking magnesium and B2 supplements nearly daily, but does periodically miss them from time to time.  From an abortive standpoint, she last used rizatriptan in September and finds it to be effective, but it does make her drowsy.  We discussed the possibility of trying a different abortive at this time, but since she is taking it so infrequently she preferred to keep it the same for now.  She is working with physical therapy on her neck pain which at times will cause migraines.  She is pending a repeat MRI with contrast later this year and continues to follow with neurosurgery.  2. Anxiety disorder  3. Cervicalgia      She will Return in about 8 months (around 10/24/2025).    Workup  - Neurologic assessment reveals unremarkable neurological exam.  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 (I have personally reviewed imaging and radiology read)  - MRI brain with contrast (following with neurosurgery)     Preventative:  - we discussed headache hygiene and lifestyle factors that may improve headaches  - Mg and B2 supplements  - Currently on through other providers: None  -  Past/ failed/contraindicated: Mg (did not like how it made her feel, but willing to try again), avoid anti-hypertensive's due to baseline low vitals, failed Venlafaxine (side effects)  - future options: TCA, Memantine, Diamox, CGRP med, botox     Acute:  - discussed not taking over-the-counter or prescription pain medications more than 3 days per week to prevent medication overuse/rebound headache  - Maxalt 10mg  - Currently on through other providers: None  - Past/ failed/contraindicated: None  - future options:  Triptan, prochlorperazine, Toradol IM or p.o., could consider trial of 5 days of Depakote 500 mg nightly or dexamethasone 2 mg daily for prolonged migraine, ubrelvy, reyvow, nurtec, zavzpret    History of Present Illness       Interval updates:  25: At todays visit, she reports about 4/30 headache days per month. She is taking Mg and B2 supplements daily. From an abortive standpoint, Rizatriptan is available, but she has not needed it recently (last used in September). She finds that it makes her very sleep, but helps the headache. She is seeing a mental health therapist at this time. Pt is going well at the moment - focused on the neck.     Prior History  24: Since last visit, she was having 1-2 migraines with aura per month while she was in school in the  of . But 3 weeks ago, she had a week of severe migraines. Advil, Tylenol, staying in dark room were not effective. Did not take Rizatriptan due to .Started L side of face and radiated to occipital area, and throughout the week, progressed to right side of face and then the rest of the head. During this time, experienced blurry vision in R eye. She called PCP office for advice and went to ER. Migraine cocktail significantly helped. Since then, no major headache. PCP scheduled MRI without contrast. Since that weeklong migraine, no severe headaches. But does have mild tension in bilateral posterior neck and radiating down shoulders;  this started since the weeklong severe migraine 3 weeks ago and has not abated. Has been taking Mg 400mg daily and Vitamin B2 400mg daily. Sometimes does have a dull headache over the R eye. Just graduated school and started a new job as a . Drinks at least 68oz of water daily. Has been having a lot of trouble falling asleep. Sleeps 5-6 hours a night, sometimes good quality. Does wake up at 4:30-5am and then fall back asleep, and then wakes up very tired. Uses heating pad at night, which helps. Hasn't had an aura.  5/12/23: Since her last visit, she feels that her headaches have improved.  At most, currently experiencing about 1-2 headache days per week.  She has not taken rizatriptan as she is afraid of the potential of side effects. Currently taking 400mg Mg and 200mg B2. She had a couple episodes since her last visit where she had a visual aura prior to the onset of her migraine. Both happened while she was in class and she did not have abortive medication with her.  1/11/23: Since her last visit, she tried taking Effexor but did not tolerate it.  Needed to go to the ER for evaluation and was given IV fluids, Ativan and Toradol for her headache. Reports that she felt very emotional while taking the Venlafaxine for just a couple days.   1/6/23: Ms. Hong reports a history of headaches that started around July 2022.  The description of her headaches sound migrainous and she has a strong family history suggestive of migraines as well.  I believe there are multiple contributing factors to her headaches including the genetic component, stress, anxiety and sleep difficulties.  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  We discussed potential treatment options, but thought that venlafaxine would be a good choice given her difficulties with sleep and mood.  From an abortive standpoint, I have encouraged her to decrease her use of Tylenol throughout the week and I will prescribe  "Maxalt for abortive management.  At this time based on a normal neurological exam and no significant red flags, I do not think brain imaging is warranted.  If we have difficulty treating her headaches going forward, I will obtain an MRI of the brain for further evaluation.         Objective     Physical Exam:                                                               Vitals:            Constitutional:    /62 (BP Location: Right arm, Patient Position: Sitting, Cuff Size: Standard)   Pulse 102   Temp 97.5 °F (36.4 °C) (Temporal)   Ht 5' 7\" (1.702 m)   Wt 55.8 kg (123 lb 1.6 oz)   SpO2 98%   BMI 19.28 kg/m²   BP Readings from Last 3 Encounters:   02/24/25 112/62   02/20/25 113/72   09/23/24 96/60     Pulse Readings from Last 3 Encounters:   02/24/25 102   02/20/25 77   09/23/24 58         Well developed, well nourished, well groomed. No dysmorphic features.       HEENT:  Normocephalic atraumatic. See neuro exam   Chest:  Respirations appear regular and unlabored.    Cardiovascular:  no observed significant swelling.    Musculoskeletal:  (see below under neurologic exam for evaluation of motor function and gait)   Skin:  warm and dry, not diaphoretic.    Psychiatric:  Normal behavior and appropriate affect       Neurological Examination:     Mental status/cognitive function:   Recent and remote memory intact. Attention span and concentration as well as fund of knowledge are appropriate for age. Normal language and spontaneous speech.  Cranial Nerves:  III, IV, VI-Pupils were equal, round. Extraocular movements were full and conjugate   VII-facial expression symmetric  VIII-hearing grossly intact bilaterally   Motor Exam: symmetric bulk throughout. no atrophy, fasciculations or abnormal movements noted.   Coordination:  no apparent dysmetria, ataxia or tremor noted  Gait: steady casual gait    I have spent 15 minutes with the patient today in which greater than 50% of this time was spent in " counseling/coordination of care regarding Diagnostic results, Prognosis, Risks and benefits of tx options, Patient and family education, Importance of tx compliance, Impressions, Documenting in the medical record, Reviewing/placing orders in the medical record (including tests, medications, and/or procedures), and Obtaining or reviewing history  . I also spent 15 minutes non face to face for this patient the same day.     Activity Minutes   Precharting/reviewing 10   Patient care/counseling 15   Postcharting/care coordination 5       Voice recognition software was used in the generation of this note. There may be unintentional errors including grammatical errors, spelling errors, or pronoun errors.

## 2025-02-24 NOTE — ASSESSMENT & PLAN NOTE
At today's visit, she reports that she is generally doing well.  She endorses about 4 headache days per month and finds that they are typically not severe.  She is taking magnesium and B2 supplements nearly daily, but does periodically miss them from time to time.  From an abortive standpoint, she last used rizatriptan in September and finds it to be effective, but it does make her drowsy.  We discussed the possibility of trying a different abortive at this time, but since she is taking it so infrequently she preferred to keep it the same for now.  She is working with physical therapy on her neck pain which at times will cause migraines.  She is pending a repeat MRI with contrast later this year and continues to follow with neurosurgery.

## 2025-02-24 NOTE — PATIENT INSTRUCTIONS
Additional Testing  MRI brain with contrast    Headache Calendar  Please maintain a headache calendar  Consider using phone applications such as Migraine [place] or [place] Migraine Tracker     Headache/migraine treatment:   Acute medications (for immediate treatment of a headache):   It is ok to take ibuprofen, acetaminophen or naproxen (Advil, Tylenol,  Aleve, Excedrin) if they help your headaches you should limit these to No more than 2-3 times a week to avoid medication overuse/rebound headaches.      For your more moderate to severe migraines take this medication early  Maxalt (rizatriptan) 10mg tabs - take one at the onset of headache. May repeat one time after 2 hours if pain has not resolved.   (Max 2 a day and 10 a month)      Over the counter preventive supplements for headaches/migraines  [x] Magnesium 400mg daily (If any diarrhea or upset stomach, decrease dose  as tolerated)- oxide or glycinate  [x] Riboflavin (Vitamin B2) 400mg daily - (FYI B2 may make your urine bright/neon yellow)     Lifestyle Recommendations:  [x] SLEEP - Maintain a regular sleep schedule: Adults need at least 7-8 hours of uninterrupted a night. Maintain good sleep hygiene:  Going to bed and waking up at consistent times, avoiding excessive daytime naps, avoiding caffeinated beverages in the evening, avoid excessive stimulation in the evening and generally using bed primarily for sleeping.  One hour before bedtime would recommend turning lights down lower, decreasing your activity (may read quietly, listen to music at a low volume). When you get into bed, should eliminate all technology (no texting, emailing, playing with your phone, iPad or tablet in bed).  [x] HYDRATION - Maintain good hydration.  Drink  2L of fluid a day (4 typical small water bottles)  [x] DIET - Maintain good nutrition. In particular don't skip meals and try and eat healthy balanced meals regularly.  [x] TRIGGERS - Look for other triggers and avoid them: Limit  caffeine to 1-2 cups a day or less. Avoid dietary triggers that you have noticed bring on your headaches (this could include aged cheese, peanuts, MSG, aspartame and nitrates).  [x] EXERCISE - physical exercise as we all know is good for you in many ways, and not only is good for your heart, but also is beneficial for your mental health, cognitive health and  chronic pain/headaches. I would encourage at the least 5 days of physical exercise weekly for at least 30 minutes.      Education and Follow-up  [x] Please call with any questions or concerns. Of course if any new concerning symptoms go to the emergency department.  [x] Follow up in 8 months

## 2025-02-27 ENCOUNTER — APPOINTMENT (OUTPATIENT)
Dept: PHYSICAL THERAPY | Facility: OTHER | Age: 25
End: 2025-02-27
Payer: COMMERCIAL

## 2025-02-28 ENCOUNTER — OFFICE VISIT (OUTPATIENT)
Dept: DERMATOLOGY | Facility: CLINIC | Age: 25
End: 2025-02-28
Payer: COMMERCIAL

## 2025-02-28 VITALS — TEMPERATURE: 98.4 F | BODY MASS INDEX: 19.58 KG/M2 | WEIGHT: 125 LBS

## 2025-02-28 DIAGNOSIS — L20.9 ATOPIC DERMATITIS, UNSPECIFIED TYPE: Primary | ICD-10-CM

## 2025-02-28 PROCEDURE — 99214 OFFICE O/P EST MOD 30 MIN: CPT

## 2025-02-28 RX ORDER — TRIAMCINOLONE ACETONIDE 1 MG/G
OINTMENT TOPICAL
Qty: 30 G | Refills: 0 | Status: SHIPPED | OUTPATIENT
Start: 2025-02-28

## 2025-02-28 NOTE — PROGRESS NOTES
"[facility] Dermatology Clinic Note     Patient Name: Becky Hong  Encounter Date: 02/28/2025     Have you been cared for by a [facility] Dermatologist in the last 3 years and, if so, which description applies to you?    Yes.  I have been here within the last 3 years, and my medical history has NOT changed since that time.  I am FEMALE/of child-bearing potential.    REVIEW OF SYSTEMS:  Have you recently had or currently have any of the following? No changes in my recent health.   PAST MEDICAL HISTORY:  Have you personally ever had or currently have any of the following?  If \"YES,\" then please provide more detail. No changes in my medical history.   HISTORY OF IMMUNOSUPPRESSION: Do you have a history of any of the following:  Systemic Immunosuppression such as Diabetes, Biologic or Immunotherapy, Chemotherapy, Organ Transplantation, Bone Marrow Transplantation or Prednisone?  No     Answering \"YES\" requires the addition of the dotphrase \"IMMUNOSUPPRESSED\" as the first diagnosis of the patient's visit.   FAMILY HISTORY:  Any \"first degree relatives\" (parent, brother, sister, or child) with the following?    No changes in my family's known health.   PATIENT EXPERIENCE:    Do you want the Dermatologist to perform a COMPLETE skin exam today including a clinical examination under the \"bra and underwear\" areas?  NO  If necessary, do we have your permission to call and leave a detailed message on your Preferred Phone number that includes your specific medical information?  Yes      Allergies   Allergen Reactions    Nickel Other (See Comments)     Patch testing: Positive allergen      Current Outpatient Medications:     acetaminophen (TYLENOL) 325 mg tablet, Take 650 mg by mouth every 6 (six) hours as needed for mild pain, Disp: , Rfl:     Cetirizine HCl (ZYRTEC ALLERGY PO), Take by mouth if needed, Disp: , Rfl:     ELDERBERRY PO, Take 1 tablet by mouth in the morning, Disp: , Rfl:     hydrocortisone 2.5 % ointment, " Apply to affected areas of underarms twice a day for up to 2 weeks. Can apply to upper and lower eyelids twice a day for up to 1 week when flaring ONLY., Disp: 30 g, Rfl: 0    ibuprofen (MOTRIN) 400 mg tablet, Take by mouth every 6 (six) hours as needed for mild pain, Disp: , Rfl:     Magnesium 400 MG CAPS, Take 1 capsule by mouth in the morning, Disp: , Rfl:     omeprazole (PriLOSEC) 40 MG capsule, TAKE 1 CAPSULE BY MOUTH EVERY DAY, Disp: 90 capsule, Rfl: 1    Opzelura 1.5 % CREA, Apply to affected areas of upper and lower eyelids twice a day., Disp: 60 g, Rfl: 2    rizatriptan (Maxalt) 10 mg tablet, Take 1 tablet (10 mg total) by mouth as needed for migraine Take at the onset of migraine; if symptoms continue or return, may take another dose at least 2 hours after first dose. Take no more than 2 doses in a day., Disp: 10 tablet, Rfl: 6    vitamin B-12 (VITAMIN B-12) 1,000 mcg tablet, Take 1,000 mcg by mouth daily, Disp: , Rfl:           Whom besides the patient is providing clinical information about today's encounter?   NO ADDITIONAL HISTORIAN (patient alone provided history)    Physical Exam and Assessment/Plan by Diagnosis:    ATOPIC DERMATITIS    Physical Exam:  Anatomic Location Affected:  upper and lower eyelids, left axilla, dorsal hands, and antecubital fossas  Morphological Description:  mild flaking of eyelids, erythematous plaque with fine overlying scale in left axilla, faint pink plaques on dorsal hands, no active lesions in antecubital fossa today, mild post inflammatory hyperpigmentation from reactivity to prior patch testing.   Body Surface Area Today:  <5%  Overall Severity: moderate    Additional History of Present Condition:  Patient presents for a follow up. She has been using the Opzelura twice a day to the active areas of the eyelids and left axilla. She notes the Opzelura is not working as well as it did before and she will still get flares. She has been using the hydrocortisone 2.5%  ointment as needed. She has tried Elidel in the past which was discontinued due to having a burning sensation with application. She notes she had eczema as a child. She had patch testing performed recently which demonstrated an allergy to Nickel. Patient notes a month after patch testing she noticed redness and itching in some of the areas that did not react on the final patch test read. Referral to allergy was placed. Patient has yet to schedule an appointment with allergy. Patient works at a manufacturing company. She does wear her personal protective equipment including goggles and gloves. Patient notes she did switch her deodorant to a more natural deodorant. Patient is using Dove for sensitive skin bar soap and a gentle facial moisturizer Cetaphil.     Assessment and Plan:  Based on a thorough discussion of this condition and the management approach to it (including a comprehensive discussion of the known risks, side effects and potential benefits of treatment), the patient (family) agrees to implement the following specific plan:  Continue Opzelura 1.5% cream for maintenance apply to affected areas twice a day.  Can continue to use hydrocortisone 2.5% ointment on eyelids for flares- apply to eyelids twice a day for 1-2 days.  Start triamcinolone 0.1% ointment-  apply to affected area of left axilla twice a day for 1 week.  Discussed Dupixent including risks and potential side effects. Patient would prefer to continue with topicals at this time and educate herself on Dupixent. Patient to contact office if she decides to start Dupixent.   Recommend switching deodorant from natural deodorant to VaniCream.   Recommend patient continue gentle skin care with Dove sensitive skin bar soap and Cetaphil moisturizer.   Recommend lukewarm showers no longer than 15 minutes.   Follow-up: as needed.          Scribe Attestation      I,:  Yesica Adame MA am acting as a scribe while in the presence of the attending  physician.:       I,:  Madhuri Adler PA-C personally performed the services described in this documentation    as scribed in my presence.:

## 2025-03-04 ENCOUNTER — OFFICE VISIT (OUTPATIENT)
Dept: PHYSICAL THERAPY | Facility: OTHER | Age: 25
End: 2025-03-04
Payer: COMMERCIAL

## 2025-03-04 DIAGNOSIS — M54.2 CERVICALGIA: Primary | ICD-10-CM

## 2025-03-04 PROCEDURE — 97110 THERAPEUTIC EXERCISES: CPT

## 2025-03-04 PROCEDURE — 97140 MANUAL THERAPY 1/> REGIONS: CPT

## 2025-03-04 PROCEDURE — 97112 NEUROMUSCULAR REEDUCATION: CPT

## 2025-03-04 NOTE — PROGRESS NOTES
"Daily Note     Today's date: 3/4/2025  Patient name: Becky Hnog  : 2000  MRN: [id number]  Referring provider: Teddy Valadez DO  Dx:   Encounter Diagnosis     ICD-10-CM    1. Cervicalgia  M54.2                      Subjective: \"When I was standing and walking, my big toe felt like it had pins and needles in it. In my hand, I feel it in my first three fingers but only in the lower part. I was under a lot of stress the last 3 weeks and I think it makes things worse.\"      Objective: See treatment diary below      Assessment: Becky tolerated treatment well today. Session focused on soft tissue mobilization, nerve glides and scapular stabilization. Pt required verbal and tactile cues for form during wall slides to inhibit UT. Pt began activity with OTB, but was regressed to no band due to difficulty with UT inhibition. Patient demonstrated HEP median nerve glide via return demo and given cues for form. Plan to continue nerve glides, CKC and p-ball YTIs next session.     Plan: Continue per plan of care.      Precautions: anxiety  [id number]  POC expires Unit limit Auth Expiration date PT/OT + Visit Limit?   3/24/25 BOMN N/a BOMN PCY4                             Manuals 5 - 10/14  FOTO: 68 6 - 10/24 7 -  8 -  - 12/17 10 - 12/30  FOTO: 69  12 - 2/3 13-3/4   STM KA cervical paraspinals, B upper traps KA cervical paraspinals, B upper traps  KA cervical paraspinals, B upper traps EW cervical paraspinals, B upper traps KA cervical paraspinals, B upper traps   KA UT KA cervical paraspinals, B upper traps and biceps tendon   UT / LS stretch      KA R      Cervical traction retraction extension    KA 10x  KA 10x       Cervical joint mobs  Side glide and extension KA grIII-IV Side glide and extension KA grIII-IV          P-A mob       Thoracic P-A grIII-IV  Thoracic P-A  Gr V   Central glider       L median n KA central 10x     Cupping            Nerve glides         KA L ulnar and median 20x ea " " KA L median n 20x    Neuro Re-Ed            No monies   With chin retraction GTB 5\"x20   With chin retraction BTB 5\"x20   With chin retraction OTB 5\"x20   Cervical SNAG            Cervical AROM            Cervical retraction            JUAN PABLO            Diaphragmatic breathing            Nerve glides 15x ea ulnar, radial, median 15x ea ulnar, radial, median     B median n tensioner 15x    B ulnar n tensioner 15x      FR protocol        1' ea 1' ea    Ball on wall        Red 20 circles ea, ABCx1 B Red 20 circles ea, ABCx1 B    Body blade        Elbow flex at side 20\"x3 B Elbow flex at side, shoulder flex to 90 elbow straight, 20\"x3 B    Rows / LPD  GTB 2x10 ea  GTB 2x10 ea  3x10 GTB  3x10 GTB       Repeated cervical retraction extension  10x  10x2 2x10 with wiggle 2x10 with wiggle        Wall slides     10\"x10 10\"x10 10\"x10 OTB   10\"x10    Prone YTI     nv  nv      Cervical retraction with lift   Supine 2x10  nv  Supine 2x10x5\"      Ther Ex            UBE Retro 6'  Retro 5' Retro 5' Retro 5' Retro standing 6' Retro standing 6' Retro standing 6' Retro standing  6'   RTC lift    OTB 2x8 B OTB 2x10 B  OTB 2x10 B  nv      Wall walks    OTB 10x B  OTB 10x B nv                                                                  Ther Activity            education                        Gait Training                                    Modalities                                          Note written by Arabella Alcantara, SPT under Kelly Angelucci's supervision, treatment performed by Kelly Angelucci, DPT                   "

## 2025-03-06 ENCOUNTER — SOCIAL WORK (OUTPATIENT)
Dept: BEHAVIORAL/MENTAL HEALTH CLINIC | Facility: CLINIC | Age: 25
End: 2025-03-06

## 2025-03-06 DIAGNOSIS — F41.1 GAD (GENERALIZED ANXIETY DISORDER): Primary | ICD-10-CM

## 2025-03-06 DIAGNOSIS — F42.2 MIXED OBSESSIONAL THOUGHTS AND ACTS: ICD-10-CM

## 2025-03-06 NOTE — PSYCH
Behavioral Health Psychotherapy Progress Note    Psychotherapy Provided: Individual Psychotherapy     1. POWER (generalized anxiety disorder)        2. Mixed obsessional thoughts and acts            Goals addressed in session: Goal 1, Goal 2, and Goal 3      DATA: Met with Becky individually for a therapy session.  We checked in briefly concerning how she has been doing.  She was able to share that she has been struggling a lot with her obsessions and compulsions surrounding germs.  She shared that recently she had a lot of family members getting sick which included both of her grandmothers being hospitalized.  She shared that this had triggered her  OCD and she has been obsessively washing her hands. She also shared concerning some guilt she has been experiencing related to needing to take care of her mother while she is sick, but struggling to be around her because of fear of getting her sickness. We spent time defining the difference between compulsive behavior as opposed to normal good hygiene practices.  We also discussed the process of taking small steps towards challenging her desire to complete a compulsion verses engaging in a power struggle with her thoughts.  During this session, this clinician used the following therapeutic modalities: Cognitive Behavioral Therapy, Cognitive Processing Therapy, Motivational Interviewing, and Solution-Focused Therapy    Substance Abuse was not addressed during this session. If the client is diagnosed with a co-occurring substance use disorder, please indicate any changes in the frequency or amount of use: NA. Stage of change for addressing substance use diagnoses: No substance use/Not applicable    ASSESSMENT:  Becky Hong presents with a Euthymic/ normal , Anxious mood.     her affect is Normal range and intensity, which is congruent, with her mood and the content of the session. The client has not made progress on their goals.    Becky has regressed somewhat  "with engaging in  her compulsions Becky Hong presents with a none risk of suicide, none risk of self-harm, and none risk of harm to others.    For any risk assessment that surpasses a \"low\" rating, a safety plan must be developed.    A safety plan was indicated: no  If yes, describe in detail NA    PLAN: Between sessions, Becky Hong will work on challenging some of her obsessive thoughts, and work on re-establishing boundaries at work.. At the next session, the therapist will use Client-centered Therapy, Cognitive Behavioral Therapy, Cognitive Processing Therapy, and Motivational Interviewing to address Reducing anxiety and OCD bavavior.    Behavioral Health Treatment Plan and Discharge Planning: Becky Hong is aware of and agrees to continue to work on their treatment plan. They have identified and are working toward their discharge goals. yes    Depression Follow-up Plan Completed: Not applicable    Visit start and stop times:    03/06/25  Start Time: 1201  Stop Time: 1254  Total Visit Time: 53 minutes  "

## 2025-03-17 ENCOUNTER — OFFICE VISIT (OUTPATIENT)
Dept: PHYSICAL THERAPY | Facility: OTHER | Age: 25
End: 2025-03-17
Payer: COMMERCIAL

## 2025-03-17 DIAGNOSIS — M54.2 CERVICALGIA: Primary | ICD-10-CM

## 2025-03-17 PROCEDURE — 97112 NEUROMUSCULAR REEDUCATION: CPT

## 2025-03-17 PROCEDURE — 97140 MANUAL THERAPY 1/> REGIONS: CPT

## 2025-03-17 PROCEDURE — 97110 THERAPEUTIC EXERCISES: CPT

## 2025-03-20 ENCOUNTER — SOCIAL WORK (OUTPATIENT)
Dept: BEHAVIORAL/MENTAL HEALTH CLINIC | Facility: CLINIC | Age: 25
End: 2025-03-20

## 2025-03-20 DIAGNOSIS — F41.1 GAD (GENERALIZED ANXIETY DISORDER): Primary | ICD-10-CM

## 2025-03-20 NOTE — PSYCH
Behavioral Health Psychotherapy Progress Note    Psychotherapy Provided: Individual Psychotherapy     1. POWER (generalized anxiety disorder)            Goals addressed in session: Goal 1, Goal 2, and Goal 3      DATA: Met with Becky for an individual therapy session.  We spent the first portion of the session checking in concerning how she has been doing since her last appointment.  Becky shared that she has experienced a lot of improvement with her ability to manage acting on her compulsions.  She shared that recently she has been experiencing feelings of guiltiness surrounding her past emotional meltdown.  She was challenged concerning her tendency towards being self critical and how this can increase her likelihood to build into another spiral.  Becky was able to process some of her feelings concerning some comments that her mother made in relation to her OCD surrounding her lack of understanding of her daughter's condition.  Some videos were shared with Becky so that she may help her mother better understand.  Becky also shared about some dreams that she has been having which clearly indicate work stress that is building.  We discussed positive ways to and venting sessions that have a tendency to become unproductive.  We worked on modeling boundaries through role play.  During this session, this clinician used the following therapeutic modalities: Client-centered Therapy, Cognitive Behavioral Therapy, and Cognitive Processing Therapy    Substance Abuse was not addressed during this session. If the client is diagnosed with a co-occurring substance use disorder, please indicate any changes in the frequency or amount of use: NA. Stage of change for addressing substance use diagnoses: No substance use/Not applicable    ASSESSMENT:  Becky Hong presents with a Euthymic/ normal and Anxious mood.     her affect is Normal range and intensity and Blunted, which is congruent, with her mood and the  "content of the session. The client has made progress on their goals.    Becky has been doing better with reducing acting on her compulsions to wash her hands Becky Hong presents with a none risk of suicide, none risk of self-harm, and none risk of harm to others.    For any risk assessment that surpasses a \"low\" rating, a safety plan must be developed.    A safety plan was indicated: no  If yes, describe in detail NA    PLAN: Between sessions, Becky Hong will work on using boundaries with her coworkers in regard to engaging to long with venting.. At the next session, the therapist will use Client-centered Therapy, Cognitive Behavioral Therapy, and Cognitive Processing Therapy to address Managing OCD.    Behavioral Health Treatment Plan and Discharge Planning: Becky Hong is aware of and agrees to continue to work on their treatment plan. They have identified and are working toward their discharge goals. yes    Depression Follow-up Plan Completed: Not applicable    Visit start and stop times:    03/20/25  Start Time: 1200  Stop Time: 1253  Total Visit Time: 53 minutes  "

## 2025-03-31 ENCOUNTER — EVALUATION (OUTPATIENT)
Dept: PHYSICAL THERAPY | Facility: OTHER | Age: 25
End: 2025-03-31
Payer: COMMERCIAL

## 2025-03-31 DIAGNOSIS — M54.2 CERVICALGIA: Primary | ICD-10-CM

## 2025-03-31 PROCEDURE — 97140 MANUAL THERAPY 1/> REGIONS: CPT

## 2025-03-31 PROCEDURE — 97112 NEUROMUSCULAR REEDUCATION: CPT

## 2025-03-31 PROCEDURE — 97110 THERAPEUTIC EXERCISES: CPT

## 2025-03-31 NOTE — PROGRESS NOTES
"PT Re-Evaluation     Today's date: 3/31/2025  Patient name: Becky Hong  : 2000  MRN: [id number]  Referring provider: Teddy Valadez DO  Dx:   Encounter Diagnosis     ICD-10-CM    1. Cervicalgia  M54.2                      Subjective: \"My pain is less severe than it was 2 weeks ago. I have been trying to do the chin retraction extension with wiggle as much as I can before the pain comes on.\"  Pain  Current pain ratin  At best pain ratin  At worst pain ratin  Location: midline cervical spine, surrounding musculature, and B shoulders  Quality: throbbing and pressure  Relieving factors: relaxation, rest, ice and heat  Exacerbated by: work-related activities, bending, sitting at the computer for a while  Progression: improved      Objective: See treatment diary below    Cervical/Thoracic Comments  Reflexes 2+ triceps, biceps, brachioradialis B  Myotomes: 2+ all B  Sharp Alex (-)  VBI: (-) B  Alar ligament: (-) B    Palpation: TTP B upper traps, B cervical paraspinals, R periscapular, C4-C6  Observation: FHP, rounded shoulders, mandible protrusion    P-A pressure C4-C6 hypomobile, p! (N/t today)    Cervical AROM  Flex: full, \"tension,\" p! L side with sustained flexion (improved)  Ext: 25% rhodes, no p! (Improved)  R rot: 0% rhodes, no p! (Improved)  L rot: 0% rhodes, \"tension, limited range\" (improved)   RSB: full, \"tension\" L clavicle (improved)  LSB: full, no pain (improved)    Spurlings (-) B (improved)  Compression (+) n/t  Distraction (+) n/t    ULTT  Median nerve (-) B (improved L)  Ulnar nerve (-) B (improved)    Radial nerve (-) B (improved)          Assessment:     RE 3/31/25 details: Pt reports 65-75% improvement since initial evaluation. She reports she used to be in constant pain, and now it is every so often or of it gets flared from activity. Aggravating factors continue to be work and sleeping, as she wakes in the morning with pain sporadically. She is very conscious of her posture, " which helps, but she states sometimes holding tension with good posture for long periods of time is aggravating. Alleviating factors include doing PT exercises, especially nerve glides, no monies, and chin tucks, as well as other stretches and stress management. The pain in her neck no longer wakes her up in the middle of the night. In regards to her headaches, she used to get them 3x a week, now only about once a week and in the morning or at the end of the day. Her headaches are now in the back of her head. She notices this aggravation with changes in weather, and work-related stress, which she feels she is able to manage on her own better. She gets numbness and tingling in her L arm and hand about 2-3x a week especially when driving and when straining UE or laying on her arm. She sometimes has jaw pain but is better than at initial evaluation, not noticeable anymore. Objectively, patient has near full ROM in all directions with minimal to no pain. All ULTT was negative today, with much improved ROM in median nerve distribution. Recently added chin retraction extension with wiggle every hour or before pain comes on, which has made a big difference in patient's symptoms for the positive. Plan for PT to continue including postural control, DNF training, scap activation, RTC strengthening, rhythmic stab, and CKC. Pt would benefit from skilled physical therapy services in order to address current deficits, restore PLOF, and improve QOL.            RE 12/30/2024 details: Pt reports 65-75% improvement since initial evaluation. She reports she used to be in constant pain, and now it is every so often or of it gets flared from activity. Aggravating factors continue to be work and sleeping, as she wakes in the morning with pain. She is very conscious of her posture, which helps, but she states sometimes holding tension with good posture for long periods of time is aggravating. Alleviating factors include doing PT  exercises, especially nerve glides, no monies, and chin tucks, as well as other stretches and stress management. The pain in her neck no longer wakes her up in the middle of the night. In regards to her headaches, she used to get them 3x a week, now only once a week and does not take regular meds for it, only Tylenol as necessary. Her headaches are now in the back of her head. She notices this aggravation with changes in weather, and work-related stress, which she feels she is able to manage on her own better. She gets numbness and tingling in her L arm and hand about once or twice a month and is aggravated by laying on her arm. She sometimes has jaw pain but is better than at initial evaluation. Aggravating factors for her jaw include types of food, stress, and if her headaches and neck pain are already present. Objectively, patient has near full ROM in all directions with minimal to no pain. All ULTT was negative today except L median nerve. Plan for PT to continue including postural control, DNF training, scap activation, and RTC strengthening. Pt would benefit from skilled physical therapy services in order to address current deficits, restore PLOF, and improve QOL.       IE 8/22/2024 details: Becky Hong is a pleasant 24 y.o. female who presents with neck pain.  Patient's greatest concerns are worry over not knowing what's wrong and wanting to avoid painkillers.  Pt is present for neck issues that have arose over the past couple of months. She started having migraines in 2022 which started more central at the top of her head, but recently have moved into the back of the head and into her shoulders. She feels this recent onset could be the start of a new job recently and built up stress and anxiety. Her neurologist that she sees for her migraines recommended PT. They also perform a brain MRI which showed a cavernoma, she will have a second MRI to rule out red flags. Excedrin helps her headaches but has  "not taken Maxalt yet as prescribed. She gets migraines about 3x a week, especially towards the end of her day, and feels they can be a result of the tightness in her neck and shoulders. Currently her pain is at the base of her neck 1/10 and reports it as \"someone grabbing or pressing,\" throbbing, and digging into her shoulderblades. She has noticed her neck pain wakes her in the middle of the night, but has decreased since getting a new pillow that allows her to lay more flat instead of propped. She sleeps on her L side or back. Other aggravating factors include bending down, anxiety stressors, turning head or body, looking up for sustained period, looking down at work for sustained period. Alleviating factors include Voltaren, ice, heat, new pillow, and stretching. Denies previous injury to the neck. 5 Ds and 3 Ns: R eye feels strained sometimes, she feels disoriented if she turns her head fast, and sometimes gets tingling in her hands (she thinks related to her anxiety). Additionally, patient states she has had jaw pain since high school, as well as cracking, especially with yawning, chewing gum, and opening her mouth wide. 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 Pt goals for therapy are to learn different skills to do throughout the day relieve tension, and better manage symptoms.       Objectively, the patient displayed limitations in cervical mobility and ROM along with increased symptom sensitivity to movement and palpation. The primary movement problem is poor posture resulting in pain, muscle imbalances, and headaches and limiting her ability to work without symptoms. Etiologic factors include repetitive poor body mechanics, and increased anxiety/stress due to starting a new job.  Pt may be experiencing these symptoms and objective findings based on clinical presentation of cervical pain with mobility deficits, with radiculopathy, and with headaches, with possible TMJ " involvement. No further referral is necessary at this time based upon examination results. Pt would benefit from skilled physical therapy services to address current deficits, improve quality of life, and restore PLOF with transition to home exercise program when appropriate. I expect she will increase cervical mobility/ROM and improve her posture while reporting decreased pain levels throughout the course of skilled PT.  Positive prognostic indicators include positive attitude toward recovery, good understanding of diagnosis and treatment plan options, and absence of observed red flags.  Negative prognostic indicators include chronicity of symptoms, anxiety, and high symptom irritability.       Primary Impairments:  1) poor posture  2) decreased cervical mobility  3) decreased cervical ROM  4) poor ability to manage stress and anxiety  5) increased upper limb tension B     Function Based Goals:  Patient will be independent with HEP upon discharge. - met  Patient will be able to independently manage symptoms upon discharge. - met  Patient will resume prior level of function and home ADLs with minimal to no symptoms upon discharge. - met  Patient will be able to sleep through the night with minimal to no symptoms upon discharge. - met  Patient will be able to work a full work day with minimal to no symptoms upon discharge. - partially met  Patient will be able to drive with minimal to no symptoms upon discharge. - partially met    Impairment Based Goals:  Patient will increase cervical ROM to 10% limited as noted by therapist in 3 weeks. - met  Patient will increase cervical mobility as noted by therapist in 3 weeks. - met  Patient will increase postural awareness/control to good in 4 weeks. - met  Patient will demonstrate negative ULTT as noted by therapist in 4 weeks. - partially met          Patient Goals  Patient goals for therapy: increased strength, return to sport/leisure activities, decreased pain, increased  motion, improved balance and independence with ADLs/IADLs  Patient goal: learn different skills to do throughout the day relieve tension, better manage symptoms        Plan  Patient would benefit from: skilled physical therapy  Referral necessary: No  Planned modality interventions: cryotherapy, electrical stimulation/Russian stimulation, thermotherapy: hydrocollator packs, TENS, low level laser therapy and traction    Planned therapy interventions: abdominal trunk stabilization, activity modification, balance, body mechanics training, breathing training, coordination, flexibility, functional ROM exercises, home exercise program, therapeutic exercise, therapeutic activities, stretching, strengthening, postural training, patient education, neuromuscular re-education, motor coordination training, massage, manual therapy, joint mobilization and nerve gliding    Frequency: Every other week  Duration in weeks: 12  Treatment plan discussed with: patient  Plan details: 1x every other wk over the next 3 months with HEP         Precautions: anxiety  [id number]  POC expires Unit limit Auth Expiration date PT/OT + Visit Limit?   6/23/25 BOMN N/a BOMN PCY                             Manuals 9 - 12/17 10 - 12/30  FOTO: 69 11 - 1/23 12 - 2/3 13-3/4 14 - 3/17 15 - 3/31   STM EW cervical paraspinals, B upper traps KA cervical paraspinals, B upper traps   KA UT KA cervical paraspinals, B upper traps and biceps tendon KA cervical paraspinals, B upper traps and biceps tendon, anterior scalenes KA cervical paraspinals, B upper traps and biceps tendon, anterior scalenes   UT / LS stretch  KA R        Cervical traction retraction extension KA 10x         Cervical joint mobs           P-A mob   Thoracic P-A grIII-IV  Thoracic P-A  Gr V Thoracic P-A  Gr V Thoracic P-A  Gr IV-V   Central glider   L median n KA central 10x       Cupping          Nerve glides     KA L ulnar and median 20x ea  KA L median n 20x  KA L median and ulnar n  "20x  KA L median and ulnar n 20x    Neuro Re-Ed          No monies  With chin retraction BTB 5\"x20   With chin retraction OTB 5\"x20     Cervical SNAG          Cervical AROM          Cervical retraction      10x with extension and wiggle    JUAN PABLO      10\"x10     Diaphragmatic breathing          Nerve glides   B median n tensioner 15x    B ulnar n tensioner 15x        FR protocol    1' ea 1' ea      Ball on wall    Red 20 circles ea, ABCx1 B Red 20 circles ea, ABCx1 B      Body blade    Elbow flex at side 20\"x3 B Elbow flex at side, shoulder flex to 90 elbow straight, 20\"x3 B   Elbow flex at side 20\"x3 B   Rows / LPD  3x10 GTB         Repeated cervical retraction extension           Wall slides  10\"x10 10\"x10 OTB   10\"x10   OTB 10\"x10   Prone YTI   nv    Pball 10x ea  Pball 10x2 ea   Scap pushup at wall       2x10   Cervical retraction with lift  Supine 2x10x5\"        Ther Ex          UBE Retro 5' Retro standing 6' Retro standing 6' Retro standing 6' Retro standing  6' Retro standing  6' Retro standing  6'   RTC lift  OTB 2x10 B  nv        Wall walks OTB 10x B nv                                                          Ther Activity          education                    Gait Training                              Modalities                                           "

## 2025-04-03 ENCOUNTER — SOCIAL WORK (OUTPATIENT)
Dept: BEHAVIORAL/MENTAL HEALTH CLINIC | Facility: CLINIC | Age: 25
End: 2025-04-03
Payer: COMMERCIAL

## 2025-04-03 DIAGNOSIS — F41.1 GAD (GENERALIZED ANXIETY DISORDER): Primary | ICD-10-CM

## 2025-04-03 PROCEDURE — 90837 PSYTX W PT 60 MINUTES: CPT | Performed by: COUNSELOR

## 2025-04-03 NOTE — PSYCH
Behavioral Health Psychotherapy Progress Note    Psychotherapy Provided: Individual Psychotherapy     1. POWER (generalized anxiety disorder)            Goals addressed in session: Goal 1, Goal 2, and Goal 3      DATA: Met with Becky for an individual therapy session.  We spent the first portion of the session checking in concerning how she has been doing.  She shared. That she is continuing to do better with managing her compulsions to wash her hands, but that recently she has been needing to check a lot of things in the house and on occasions will have to go back to her home after leaving to ensure things are ok. We discussed putting time in between her responses and see if the urge lessens.  We also spent time reviewing calming strategies.  Becky used a good portion of the session processing some of her feelings of anger surrounding one of her friends.  We worked on Identifying ways she could speak up for herself as well as challenge herself to share more about herself personally to grow the relationship  During this session, this clinician used the following therapeutic modalities: Client-centered Therapy, Cognitive Behavioral Therapy, Cognitive Processing Therapy, and Motivational Interviewing    Substance Abuse was not addressed during this session. If the client is diagnosed with a co-occurring substance use disorder, please indicate any changes in the frequency or amount of use: NA. Stage of change for addressing substance use diagnoses: No substance use/Not applicable    ASSESSMENT:  Becky Hong presents with a Euthymic/ normal and Anxious mood.     her affect is Normal range and intensity, which is congruent, with her mood and the content of the session. The client has made progress on their goals.    Becky was able to identity that she is angry towards her friend due to her own inability to communicate her feelings. Becky Hong presents with a none risk of suicide, none risk of  "self-harm, and none risk of harm to others.    For any risk assessment that surpasses a \"low\" rating, a safety plan must be developed.    A safety plan was indicated: no  If yes, describe in detail NA    PLAN: Between sessions, Becky Hong will challenge herself to share personal information with her friend even if she is tired. At the next session, the therapist will use Cognitive Behavioral Therapy, Cognitive Processing Therapy, Motivational Interviewing, and Solution-Focused Therapy to address Managing anxiety and social skills.    Behavioral Health Treatment Plan and Discharge Planning: Becky Hong is aware of and agrees to continue to work on their treatment plan. They have identified and are working toward their discharge goals. yes    Depression Follow-up Plan Completed: Not applicable    Visit start and stop times:    04/03/25  Start Time: 1200  Stop Time: 1253  Total Visit Time: 53 minutes  "

## 2025-04-14 ENCOUNTER — ANNUAL EXAM (OUTPATIENT)
Age: 25
End: 2025-04-14
Payer: COMMERCIAL

## 2025-04-14 ENCOUNTER — OFFICE VISIT (OUTPATIENT)
Dept: PHYSICAL THERAPY | Facility: OTHER | Age: 25
End: 2025-04-14
Payer: COMMERCIAL

## 2025-04-14 VITALS
WEIGHT: 122.4 LBS | SYSTOLIC BLOOD PRESSURE: 92 MMHG | BODY MASS INDEX: 19.21 KG/M2 | DIASTOLIC BLOOD PRESSURE: 60 MMHG | HEIGHT: 67 IN

## 2025-04-14 DIAGNOSIS — M54.2 CERVICALGIA: Primary | ICD-10-CM

## 2025-04-14 DIAGNOSIS — Z01.419 ENCOUNTER FOR ANNUAL ROUTINE GYNECOLOGICAL EXAMINATION: Primary | ICD-10-CM

## 2025-04-14 PROCEDURE — 97140 MANUAL THERAPY 1/> REGIONS: CPT

## 2025-04-14 PROCEDURE — 97110 THERAPEUTIC EXERCISES: CPT

## 2025-04-14 PROCEDURE — S0612 ANNUAL GYNECOLOGICAL EXAMINA: HCPCS | Performed by: OBSTETRICS & GYNECOLOGY

## 2025-04-14 NOTE — PROGRESS NOTES
Assessment/Plan:    Encounter for annual routine gynecological examination (Primary)      Subjective      Becky Hong is a 25 y.o. female who presents for annual exam. Periods are overall regular.  She has never been sexually active.  She denies any breast or urinary concerns.  Still reports occasional perineal odor with cycle.    Current contraception: abstinence  History of abnormal Pap smear: no  Regular self breast exam: yes  History of abnormal mammogram: no  History of abnormal lipids: no    Menstrual History:  Menarche age: 16  Patient's last menstrual period was 04/04/2025 (exact date).  Period Cycle (Days): 28  Period Duration (Days): 8-10  Period Pattern: Regular  Menstrual Flow: Heavy, Light (Heavy x2 days)  Menstrual Control: Tampon, Maxi pad  Menstrual Control Change Freq (Hours): 3  Dysmenorrhea: (!) Moderate  Dysmenorrhea Symptoms: Cramping, Throbbing    Past Medical History:   Diagnosis Date    Anxiety 2010    Eczema January 2020    GERD (gastroesophageal reflux disease)     Headache(784.0) 2021    Migraine 07/2022    Panic attack 2020       Family History   Problem Relation Age of Onset    Thyroid disease Mother     Autoimmune disease Mother     Hypertension Mother     Deep vein thrombosis Mother         During Pregnancy    Migraines Father     Diabetes Maternal Grandmother     Hypertension Maternal Grandmother     Depression Maternal Grandmother     Stroke Maternal Grandmother     Migraines Maternal Grandmother     Dementia Maternal Grandfather     Hypertension Maternal Grandfather     Lung cancer Maternal Grandfather     Cancer Maternal Grandfather     Stroke Maternal Grandfather     Heart attack Maternal Grandfather     Hypertension Paternal Grandmother     Prostate cancer Paternal Grandfather     Hypertension Paternal Grandfather     Factor V Leiden deficiency Maternal Uncle     Factor V Leiden deficiency Paternal Uncle        The following portions of the patient's history were reviewed  "and updated as appropriate: allergies, current medications, past family history, past medical history, past social history, past surgical history, and problem list.    Review of Systems  Pertinent items are noted in HPI.      Objective      BP 92/60 (BP Location: Right arm, Patient Position: Sitting, Cuff Size: Standard)   Ht 5' 6.5\" (1.689 m)   Wt 55.5 kg (122 lb 6.4 oz)   LMP 04/04/2025 (Exact Date)   BMI 19.46 kg/m²     General:   alert and oriented, in no acute distress   Heart:  Breasts: regular rate and rhythm  appear normal, no suspicious masses, no skin or nipple changes or axillary nodes.   Lungs: Effort normal   Abdomen: soft, non-tender, without masses or organomegaly   Vulva: normal   Vagina: normal mucosa   Cervix: no lesions   Uterus: normal size, non-tender   Adnexa:  Bladder: normal adnexa and no mass, fullness, tenderness  Non-tender               "

## 2025-04-17 ENCOUNTER — SOCIAL WORK (OUTPATIENT)
Dept: BEHAVIORAL/MENTAL HEALTH CLINIC | Facility: CLINIC | Age: 25
End: 2025-04-17
Payer: COMMERCIAL

## 2025-04-17 DIAGNOSIS — F42.2 MIXED OBSESSIONAL THOUGHTS AND ACTS: Primary | ICD-10-CM

## 2025-04-17 DIAGNOSIS — F41.1 GAD (GENERALIZED ANXIETY DISORDER): ICD-10-CM

## 2025-04-17 PROCEDURE — 90837 PSYTX W PT 60 MINUTES: CPT | Performed by: COUNSELOR

## 2025-04-17 NOTE — PSYCH
Behavioral Health Psychotherapy Progress Note    Psychotherapy Provided: Individual Psychotherapy     1. Mixed obsessional thoughts and acts        2. POWER (generalized anxiety disorder)            Goals addressed in session: Goal 1, Goal 2, and Goal 3      DATA: Met with Becky for an individual therapy session We spent the first portion of the session checking n concerning how she has been doing since her past appointment.  Becky shared that she has been doing good overall other than some struggle with migraines.  We spent time reviewing her progress with reducing her compulsion to wash her hands.  She shared that currently she is only washing hands after coming home from work and before meals.  We spent a good portion of the session discussing a recent development with her friendship.  She shared that her friend recently apologized to her regarding her tendency to not be as supportive as she is.  Becky shared that she felt good that her behavior was acknowledged and feels that their friendship will now be able to operate at a better level.  We spent a good portion of the session discussing some of her feelings of burnout with her present job.  We discussed boundary setting and utilizing self-care and time away from her position.  We ended the session with briefly allowing Becky to share some frustration with her mother concerning pressure to participate in [employer] activities.  Becky was able to identify that while she does have jasbir does not feel connected with her mother's [employer] largely due to their aging population.  During this session, this clinician used the following therapeutic modalities: Client-centered Therapy, Cognitive Behavioral Therapy, Cognitive Processing Therapy, and Motivational Interviewing    Substance Abuse was not addressed during this session. If the client is diagnosed with a co-occurring substance use disorder, please indicate any changes in the frequency or amount of use:  "NA. Stage of change for addressing substance use diagnoses: No substance use/Not applicable    ASSESSMENT:  Becky Hong presents with a Euthymic/ normal mood.     her affect is Normal range and intensity, which is congruent, with her mood and the content of the session. The client has made progress on their goals.    Becky was able to work out her differences with her friend by acknowledging that she felt taken advantage of which resulted in an apology from her friend Becky Hong presents with a none risk of suicide, none risk of self-harm, and none risk of harm to others.    For any risk assessment that surpasses a \"low\" rating, a safety plan must be developed.    A safety plan was indicated: no  If yes, describe in detail NA    PLAN: Between sessions, Becky Hong will consider exploring different churches with younger populations. At the next session, the therapist will use Client-centered Therapy, Cognitive Behavioral Therapy, Cognitive Processing Therapy, and Motivational Interviewing to address confidence building.    Behavioral Health Treatment Plan and Discharge Planning: Becky Hong is aware of and agrees to continue to work on their treatment plan. They have identified and are working toward their discharge goals. yes    Depression Follow-up Plan Completed: Not applicable    Visit start and stop times:    04/17/25  Start Time: 1202  Stop Time: 1255  Total Visit Time: 53 minutes  "

## 2025-04-28 ENCOUNTER — OFFICE VISIT (OUTPATIENT)
Dept: PHYSICAL THERAPY | Facility: OTHER | Age: 25
End: 2025-04-28
Payer: COMMERCIAL

## 2025-04-28 DIAGNOSIS — M54.2 CERVICALGIA: Primary | ICD-10-CM

## 2025-04-28 PROCEDURE — 97110 THERAPEUTIC EXERCISES: CPT | Performed by: PEDIATRICS

## 2025-04-28 PROCEDURE — 97140 MANUAL THERAPY 1/> REGIONS: CPT | Performed by: PEDIATRICS

## 2025-04-28 NOTE — PROGRESS NOTES
"Daily Note     Today's date: 2025  Patient name: Becky Hong  : 2000  MRN: [id number]  Referring provider: Teddy Valadez DO  Dx:   Encounter Diagnosis     ICD-10-CM    1. Cervicalgia  M54.2                      Subjective: \"I feel a little better than last time but I am still pretty sore in  my left upper trap and still get tingling into my hand.\"       Objective: See treatment diary below      Assessment: Due to Becky's minimal improvement since last session, continued to focus on manual interventions to decrease muscle tension and trigger points throughout L upper trap, mid trap, and rhomboids. Gentle T spine PROM performed as significant restrictions noted. Decreased symptoms noted post treatment session.  FOTO nv.     Plan: Continue per plan of care.      Precautions: anxiety  [id number]  POC expires Unit limit Auth Expiration date PT/OT + Visit Limit?   25 BOMN N/a BOMN PCY                             Manuals  12 - 2/3 13-3/4 14 - 3/17 15 - 3/31 16 - 17-   STM  KA UT KA cervical paraspinals, B upper traps and biceps tendon KA cervical paraspinals, B upper traps and biceps tendon, anterior scalenes KA cervical paraspinals, B upper traps and biceps tendon, anterior scalenes RR cervical paraspinals, B upper traps, rhomboids, mid traps  EW cervical paraspinals, B upper traps, rhomboids, mid traps    UT / LS stretch          Cervical traction retraction extension          Cervical joint mobs           P-A mob Thoracic P-A grIII-IV  Thoracic P-A  Gr V Thoracic P-A  Gr V Thoracic P-A  Gr IV-V RR Thoracic screw mobilization gr V  EW PROM   Central glider L median n KA central 10x         Cupping          Nerve glides   KA L ulnar and median 20x ea  KA L median n 20x  KA L median and ulnar n 20x  KA L median and ulnar n 20x   EW L median   Headache Mobilizations      2x10     Reverse Headache Mobilization      2x10    Neuro Re-Ed          No monies   With " "chin retraction OTB 5\"x20       Cervical SNAG          Cervical AROM          Cervical retraction    10x with extension and wiggle      JUAN PABLO    10\"x10       Diaphragmatic breathing          Nerve glides B median n tensioner 15x    B ulnar n tensioner 15x          FR protocol  1' ea 1' ea        Ball on wall  Red 20 circles ea, ABCx1 B Red 20 circles ea, ABCx1 B        Body blade  Elbow flex at side 20\"x3 B Elbow flex at side, shoulder flex to 90 elbow straight, 20\"x3 B   Elbow flex at side 20\"x3 B     Rows / LPD           Repeated cervical retraction extension           Wall slides    10\"x10   OTB 10\"x10     Prone YTI     Pball 10x ea  Pball 10x2 ea     Scap pushup at wall     2x10     Cervical retraction with lift          Ther Ex          UBE Retro standing 6' Retro standing 6' Retro standing  6' Retro standing  6' Retro standing  6' Retro standing 6' Retro standing 6'   RTC lift           Wall walks                                                            Ther Activity          education                    Gait Training                              Modalities                                               "

## 2025-05-01 ENCOUNTER — SOCIAL WORK (OUTPATIENT)
Dept: BEHAVIORAL/MENTAL HEALTH CLINIC | Facility: CLINIC | Age: 25
End: 2025-05-01
Payer: COMMERCIAL

## 2025-05-01 DIAGNOSIS — F41.1 GAD (GENERALIZED ANXIETY DISORDER): Primary | ICD-10-CM

## 2025-05-01 DIAGNOSIS — F42.2 MIXED OBSESSIONAL THOUGHTS AND ACTS: ICD-10-CM

## 2025-05-01 PROCEDURE — 90837 PSYTX W PT 60 MINUTES: CPT | Performed by: COUNSELOR

## 2025-05-01 NOTE — PSYCH
Behavioral Health Psychotherapy Progress Note    Psychotherapy Provided: Individual Psychotherapy     1. POWER (generalized anxiety disorder)        2. Mixed obsessional thoughts and acts            Goals addressed in session: Goal 1, Goal 2, and Goal 3      DATA: Met with Becky for an individual therapy session.  We spent the first portion of the session checking and concerning how she has been doing.  She shared that she has been experiencing a downswing with her emotions and is having a difficult time managing her anxious thoughts.  She shared that this was triggered this past Friday went to her friends were meeting together and she chose to not go along.  She shared that she was experiencing a difficult time with feeling she was doing something wrong as a friend.  She also shared that in the past she has lost friendships by being left out when other friends met up together.  We spent some time challenging her negative thoughts concerning her behavior for which previously her friend had apologized for not being more supportive as a friend when Becky is always supportive concerning listening to her problems.  We spent time reviewing thought stopping techniques.  We also discussed having rules supporting friendship expectations to help her discern when she steps out of typical friendship expectations.  She was also challenged concerning how it was unreasonable to always be agreeable to others wants and needs.  We also spent a good portion of the session allowing Becky to process some of her frustrations concerning her workload at her job and communication stresses.  During this session, this clinician used the following therapeutic modalities: Client-centered Therapy, Cognitive Behavioral Therapy, Cognitive Processing Therapy, and Motivational Interviewing    Substance Abuse was not addressed during this session. If the client is diagnosed with a co-occurring substance use disorder, please indicate any  "changes in the frequency or amount of use: NA. Stage of change for addressing substance use diagnoses: No substance use/Not applicable    ASSESSMENT:  Becky Hong presents with a Euthymic/ normal and Anxious mood.     her affect is Normal range and intensity, which is congruent, with her mood and the content of the session. The client has made progress on their goals.    Becky was able to acknowledge not being able to always make everyone around her happy and that argument is a part of a healthy relationship. Becky Hong presents with a none risk of suicide, none risk of self-harm, and none risk of harm to others.    For any risk assessment that surpasses a \"low\" rating, a safety plan must be developed.    A safety plan was indicated: no  If yes, describe in detail NA    PLAN: Between sessions, Becky Hong will work on establishing friendship rules that are reasonable. At the next session, the therapist will use Client-centered Therapy, Cognitive Behavioral Therapy, and Cognitive Processing Therapy to address Managing anxiety.    Behavioral Health Treatment Plan and Discharge Planning: Becky Hong is aware of and agrees to continue to work on their treatment plan. They have identified and are working toward their discharge goals. yes    Depression Follow-up Plan Completed: Not applicable    Visit start and stop times:    05/01/25  Start Time: 1200  Stop Time: 1254  Total Visit Time: 54 minutes  "

## 2025-05-12 ENCOUNTER — APPOINTMENT (OUTPATIENT)
Dept: PHYSICAL THERAPY | Facility: OTHER | Age: 25
End: 2025-05-12
Payer: COMMERCIAL

## 2025-05-15 ENCOUNTER — SOCIAL WORK (OUTPATIENT)
Dept: BEHAVIORAL/MENTAL HEALTH CLINIC | Facility: CLINIC | Age: 25
End: 2025-05-15
Payer: COMMERCIAL

## 2025-05-15 ENCOUNTER — APPOINTMENT (OUTPATIENT)
Dept: PHYSICAL THERAPY | Facility: OTHER | Age: 25
End: 2025-05-15
Payer: COMMERCIAL

## 2025-05-15 DIAGNOSIS — F42.2 MIXED OBSESSIONAL THOUGHTS AND ACTS: ICD-10-CM

## 2025-05-15 DIAGNOSIS — F41.1 GAD (GENERALIZED ANXIETY DISORDER): Primary | ICD-10-CM

## 2025-05-15 PROCEDURE — 90837 PSYTX W PT 60 MINUTES: CPT | Performed by: COUNSELOR

## 2025-05-15 NOTE — PSYCH
"Behavioral Health Psychotherapy Progress Note    Psychotherapy Provided: Individual Psychotherapy     1. POWER (generalized anxiety disorder)        2. Mixed obsessional thoughts and acts            Goals addressed in session: Goal 1, Goal 2, and Goal 3      DATA: Met with Becky for an individual therapy session.  We spent the first portion of the session checking and concerning how she has been doing since her last appointment.  Becky shared that she has been doing good overall and she feels proud that she was able to confront her friend regarding some of her behavior.  She shared that she continues to struggle with the anxious thought that \"were not okay\".  We spent time processing the events that occurred with her friend.  She was able to acknowledge that she did all she could in regards to communication and that if there is a problem it would be her friend's responsibility to share this.  She was able to resolve to accepting things as they are.  We also spent a good portion of the session processing and work related concerns.  She was able to identify that she needs to work on Not be overly emotional when her work in scrutinized, but needs to none the less advocate when criticisms are not justified. We discussed using exercise as a more positive coping strategy for managing work stress as opposed to social media or technology..  During this session, this clinician used the following therapeutic modalities: Client-centered Therapy, Cognitive Behavioral Therapy, Cognitive Processing Therapy, Motivational Interviewing, and Solution-Focused Therapy    Substance Abuse was not addressed during this session. If the client is diagnosed with a co-occurring substance use disorder, please indicate any changes in the frequency or amount of use: NA. Stage of change for addressing substance use diagnoses: No substance use/Not applicable    ASSESSMENT:  Becky Hong presents with a Euthymic/ normal mood.     her " "affect is Normal range and intensity, which is congruent, with her mood and the content of the session. The client has made progress on their goals.    Becky was able to advocate for herself with her friend. Becky Hong presents with a none risk of suicide, none risk of self-harm, and none risk of harm to others.    For any risk assessment that surpasses a \"low\" rating, a safety plan must be developed.    A safety plan was indicated: no  If yes, describe in detail NA    PLAN: Between sessions, Becky Hong will work on responding with less emotion to criticisms at work. At the next session, the therapist will use Client-centered Therapy, Cognitive Behavioral Therapy, and Cognitive Processing Therapy to address confidence.    Behavioral Health Treatment Plan and Discharge Planning: Becky Hong is aware of and agrees to continue to work on their treatment plan. They have identified and are working toward their discharge goals. yes    Depression Follow-up Plan Completed: Not applicable    Visit start and stop times:    05/15/25  Start Time: 1200  Stop Time: 1253  Total Visit Time: 53 minutes  "

## 2025-05-21 ENCOUNTER — OFFICE VISIT (OUTPATIENT)
Dept: PHYSICAL THERAPY | Facility: OTHER | Age: 25
End: 2025-05-21
Payer: COMMERCIAL

## 2025-05-21 DIAGNOSIS — M54.2 CERVICALGIA: Primary | ICD-10-CM

## 2025-05-21 PROCEDURE — 97140 MANUAL THERAPY 1/> REGIONS: CPT | Performed by: PEDIATRICS

## 2025-05-21 PROCEDURE — 97110 THERAPEUTIC EXERCISES: CPT | Performed by: PEDIATRICS

## 2025-05-21 PROCEDURE — 97112 NEUROMUSCULAR REEDUCATION: CPT | Performed by: PEDIATRICS

## 2025-05-21 NOTE — PROGRESS NOTES
"Daily Note     Today's date: 2025  Patient name: Becky Hong  : 2000  MRN: [id number]  Referring provider: Teddy Valadez DO  Dx:   Encounter Diagnosis     ICD-10-CM    1. Cervicalgia  M54.2                      Subjective: \"I feel better than when you saw me last. My headaches have been mostly due to allergies. I don't have as much pain in my neck and shoulder. I do feel a little sore.\"       Objective: See treatment diary below      Assessment: Able to resume exercises today. Since patient has not been to PT for about 3 weeks, resumed exercises last performed. Will monitor response NV.    Plan: Continue per plan of care.      Precautions: anxiety  [id number]  POC expires Unit limit Auth Expiration date PT/OT + Visit Limit?   25 BOMN N/a BOMN PCY                             Manuals  - 3/17 15 - 3/31 16 - 4/14 17-4/28 18-  FOTO 68   STM KA cervical paraspinals, B upper traps and biceps tendon, anterior scalenes KA cervical paraspinals, B upper traps and biceps tendon, anterior scalenes RR cervical paraspinals, B upper traps, rhomboids, mid traps  EW cervical paraspinals, B upper traps, rhomboids, mid traps  EW cervical paraspinals, B upper traps, rhomboids, mid traps    UT / LS stretch        Cervical traction retraction extension        Cervical joint mobs         P-A mob Thoracic P-A  Gr V Thoracic P-A  Gr IV-V RR Thoracic screw mobilization gr V  EW PROM EW ROM   Central glider        Cupping        Nerve glides  KA L median and ulnar n 20x  KA L median and ulnar n 20x   EW L median EW L median   Headache Mobilizations   2x10      Reverse Headache Mobilization   2x10     Neuro Re-Ed        No monies        Cervical SNAG        Cervical AROM        Cervical retraction 10x with extension and wiggle       JUAN PABLO 10\"x10        Diaphragmatic breathing        Nerve glides        FR protocol         Ball on wall         Body blade   Elbow flex at side 20\"x3 B   Elbow flex at side " "20\"x3 B   Rows / LPD         Repeated cervical retraction extension         Wall slides   OTB 10\"x10   OTB 10\"x10   Prone YTI  Pball 10x ea  Pball 10x2 ea   Pball 10x2 ea   Scap pushup at wall  2x10   2x10   Cervical retraction with lift        Ther Ex        UBE Retro standing  6' Retro standing  6' Retro standing 6' Retro standing 6' Retro standing 6'   RTC lift         Wall walks                                                Ther Activity        education                Gait Training                        Modalities                                         "

## 2025-05-28 ENCOUNTER — TELEPHONE (OUTPATIENT)
Age: 25
End: 2025-05-28

## 2025-05-28 NOTE — TELEPHONE ENCOUNTER
Patient is calling regarding cancelling an appointment.    Date/Time: 5.29.25 @ 1:00pm    Reason: Schedule Conflict    Patient was rescheduled: YES [] NO [x]  Patient did not reschedule at this time, due to upcoming appointment on 6.12.25 @ 12:00pm.    The provider was made aware via secure chat, and the clerical department was updated.

## 2025-06-02 ENCOUNTER — APPOINTMENT (OUTPATIENT)
Dept: PHYSICAL THERAPY | Facility: OTHER | Age: 25
End: 2025-06-02
Payer: COMMERCIAL

## 2025-06-09 ENCOUNTER — APPOINTMENT (OUTPATIENT)
Dept: PHYSICAL THERAPY | Facility: OTHER | Age: 25
End: 2025-06-09
Payer: COMMERCIAL

## 2025-06-10 ENCOUNTER — OFFICE VISIT (OUTPATIENT)
Dept: PHYSICAL THERAPY | Facility: OTHER | Age: 25
End: 2025-06-10
Payer: COMMERCIAL

## 2025-06-10 ENCOUNTER — APPOINTMENT (OUTPATIENT)
Dept: PHYSICAL THERAPY | Facility: OTHER | Age: 25
End: 2025-06-10
Payer: COMMERCIAL

## 2025-06-10 DIAGNOSIS — M54.2 CERVICALGIA: Primary | ICD-10-CM

## 2025-06-10 PROCEDURE — 97140 MANUAL THERAPY 1/> REGIONS: CPT | Performed by: PEDIATRICS

## 2025-06-10 PROCEDURE — 97112 NEUROMUSCULAR REEDUCATION: CPT | Performed by: PEDIATRICS

## 2025-06-10 NOTE — PROGRESS NOTES
"Daily Note     Today's date: 6/10/2025  Patient name: Becky Hong  : 2000  MRN: [id number]  Referring provider: Teddy Valadez DO  Dx:   Encounter Diagnosis     ICD-10-CM    1. Cervicalgia  M54.2               IEP [id number]  1:1 [id number]         Subjective: \"I had a terrible migraine yesterday and now I have residual pain from my neck into my upper back. I am in a decent amount of pain.\"      Objective: See treatment diary below      Assessment: Focused primarily on manual interventions with light postural ex performed. Discussed using foam roll for thoracic extension at home. Patient noted moderate cervical pain post session. Slight improvement from start of treatment session.     Plan: Continue per plan of care.      Precautions: anxiety  [id number]  POC expires Unit limit Auth Expiration date PT/OT + Visit Limit?   25 BOMN N/a BOMN PCY                             Manuals 14 - 3/17 15 - 3/31 16 - 4/14 17-4/28 18-  FOTO 68 19-6/10   STM KA cervical paraspinals, B upper traps and biceps tendon, anterior scalenes KA cervical paraspinals, B upper traps and biceps tendon, anterior scalenes RR cervical paraspinals, B upper traps, rhomboids, mid traps  EW cervical paraspinals, B upper traps, rhomboids, mid traps  EW cervical paraspinals, B upper traps, rhomboids, mid traps  EW cervical paraspinals, B upper traps, rhomboids, mid traps    UT / LS stretch         Cervical traction retraction extension         Cervical joint mobs       KA L side glide   P-A mob Thoracic P-A  Gr V Thoracic P-A  Gr IV-V RR Thoracic screw mobilization gr V  EW PROM EW ROM    Central glider         Cupping         Nerve glides  KA L median and ulnar n 20x  KA L median and ulnar n 20x   EW L median EW L median EW L median and ulnar   Headache Mobilizations   2x10       Reverse Headache Mobilization   2x10      Neuro Re-Ed         No monies      [place]  5\"x20   Scap retraction      5\"x20   Cervical SNAG       " "  Cervical AROM         Cervical retraction 10x with extension and wiggle     5\"x20   JUAN PABLO 10\"x10      10\"x10   Diaphragmatic breathing         Nerve glides         FR protocol          Ball on wall          Body blade   Elbow flex at side 20\"x3 B   Elbow flex at side 20\"x3 B    Rows / LPD          Repeated cervical retraction extension          Wall slides   OTB 10\"x10   OTB 10\"x10    Prone YTI  Pball 10x ea  Pball 10x2 ea   Pball 10x2 ea    Scap pushup at wall  2x10   2x10    Cervical retraction with lift         Ther Ex         UBE Retro standing  6' Retro standing  6' Retro standing 6' Retro standing 6' Retro standing 6'    RTC lift          Wall walks                                                      Ther Activity         education                  Gait Training                           Modalities                                            "

## 2025-06-12 ENCOUNTER — SOCIAL WORK (OUTPATIENT)
Dept: BEHAVIORAL/MENTAL HEALTH CLINIC | Facility: CLINIC | Age: 25
End: 2025-06-12
Payer: COMMERCIAL

## 2025-06-12 DIAGNOSIS — F42.2 MIXED OBSESSIONAL THOUGHTS AND ACTS: Primary | ICD-10-CM

## 2025-06-12 PROCEDURE — 90837 PSYTX W PT 60 MINUTES: CPT | Performed by: COUNSELOR

## 2025-06-12 PROCEDURE — 99212 OFFICE O/P EST SF 10 MIN: CPT | Performed by: COUNSELOR

## 2025-06-12 NOTE — PSYCH
"Behavioral Health Psychotherapy Progress Note    Psychotherapy Provided: Individual Psychotherapy     1. Mixed obsessional thoughts and acts            Goals addressed in session: Goal 1, Goal 2, and Goal 3      DATA: Met with Becky for an individual therapy session.  We spent the first portion of the session checking in concerning how she has been doing since her last appointment.  She shared that recently she has been experiencing more anxiousness.  She attributed this to experiencing migraines recently as well as plans for traveling over the weekend.  She shared that she is meeting up with some college friends to attend the wedding of one of her friends.  She shared about how she often feels under the weather when she takes her migraine medication and this triggers a lot of circular thoughts surrounding germs and getting sick.  We spent time reviewing ways of dealing her compulsion to seek reassurance.  We also reviewed thought stopping strategies.  We spent the remainder of the session allowing Becky to process some of her feelings about her upcoming trip and some of the dread that she is experiencing and forward to the event.  She was given two coping cards to help remind her of things she can say to herself when coping with  Anxiety as well as  ways  \"resist the Urge\"  During this session, this clinician used the following therapeutic modalities: Client-centered Therapy, Cognitive Behavioral Therapy, Cognitive Processing Therapy, Mindfulness-based Strategies, and Solution-Focused Therapy    Substance Abuse was not addressed during this session. If the client is diagnosed with a co-occurring substance use disorder, please indicate any changes in the frequency or amount of use: NA. Stage of change for addressing substance use diagnoses: No substance use/Not applicable    ASSESSMENT:  Becky Hong presents with a Euthymic/ normal and Anxious mood.     her affect is Normal range and intensity and " "Blunted, which is congruent, with her mood and the content of the session. The client has made progress on their goals.    Becky was able to review several coping strategies. Becky Hong presents with a none risk of suicide, none risk of self-harm, and none risk of harm to others.    For any risk assessment that surpasses a \"low\" rating, a safety plan must be developed.    A safety plan was indicated: no  If yes, describe in detail NA    PLAN: Between sessions, Becky Hong will work on using coping cards and mindfulness to manage her OCD and anxiety.. At the next session, the therapist will use Client-centered Therapy, Cognitive Behavioral Therapy, Cognitive Processing Therapy, Motivational Interviewing, and Solution-Focused Therapy to address managing worry.    Behavioral Health Treatment Plan and Discharge Planning: Becky Hong is aware of and agrees to continue to work on their treatment plan. They have identified and are working toward their discharge goals. yes    Depression Follow-up Plan Completed: Not applicable    Visit start and stop times:    06/12/25  Start Time: 1200  Stop Time: 1253  Total Visit Time: 53 minutes  "

## 2025-06-24 ENCOUNTER — EVALUATION (OUTPATIENT)
Dept: PHYSICAL THERAPY | Facility: OTHER | Age: 25
End: 2025-06-24
Payer: COMMERCIAL

## 2025-06-24 DIAGNOSIS — M54.2 CERVICALGIA: Primary | ICD-10-CM

## 2025-06-24 PROCEDURE — 97164 PT RE-EVAL EST PLAN CARE: CPT

## 2025-06-24 PROCEDURE — 97112 NEUROMUSCULAR REEDUCATION: CPT

## 2025-06-24 PROCEDURE — 97140 MANUAL THERAPY 1/> REGIONS: CPT

## 2025-06-24 NOTE — PROGRESS NOTES
"PT Re-Evaluation     Today's date: 2025  Patient name: Becky Hong  : 2000  MRN: [id number]  Referring provider: Teddy Valadez DO  Dx:   Encounter Diagnosis     ICD-10-CM    1. Cervicalgia  M54.2                    Total treatment time [id number], [id number]  Subjective: \"I have been pretty good overall, I have been getting random migraines which are causing the pain and I was traveling which flared me up.\"  Pain  Current pain ratin  At best pain ratin  At worst pain ratin  Location: midline cervical spine, surrounding musculature, and B shoulders  Quality: throbbing and pressure  Relieving factors: relaxation, rest, ice and heat  Exacerbated by: work-related activities, bending, sitting at the computer for a while  Progression: improved      Objective: See treatment diary below    Cervical/Thoracic Comments  Reflexes 2+ triceps, biceps, brachioradialis B  Myotomes: 2+ all B  Sharp Alex (-)  VBI: (-) B  Alar ligament: (-) B    Palpation: TTP B upper traps, B cervical paraspinals, R periscapular, C4-C6  Observation: FHP, rounded shoulders, mandible protrusion    P-A pressure C4-C6 hypomobile, p! (N/t today)    Cervical AROM  Flex: full, \"tension,\" p! L side with sustained flexion (improved)  Ext: 25% rhodes, no p! (Improved)  R rot: 0% rhodes, no p! (Improved)  L rot: 0% rhodes, \"tension, limited range\" (improved)   RSB: full, \"tension\" L clavicle (improved)  LSB: full, no pain (improved)    Spurlings (-) B (improved)  Compression (+) n/t  Distraction (+) n/t    ULTT  Median nerve (-) B (improved L)  Ulnar nerve (-) B (improved)    Radial nerve (-) B (improved)          Assessment:     RE 25 details: Pt reports about 70% improvement in symptoms since initial evaluation. She continues to have flare ups in neck pain and headaches dependent on activity, traveling, and weather. Sometimes there are no triggers to the migraines, but definitely come on dependent on sleeping position, " stressors, and heat about 3-4x a week in frequency. Patient also feels her TMJ could be contributing to her symptoms - will plan to address in future visits. Reports a great decrease in numbness and tingling in UE but sometimes is increased with migraine. Alleviating factors include doing PT exercises, especially nerve glides, no monies, and chin tucks, as well as other stretches and stress management. The pain in her neck no longer wakes her up in the middle of the night. Objectively, no significant changes at this time. Recent PT sessions have addressed postural control, scapular activation, DNF activation, and manual work as needed. Plan for PT to continue including postural control, DNF training, scap activation, RTC strengthening, rhythmic stab, and CKC, as well as integrating TMJ treatment. Discuss neuro follow-up with patient nv. Pt would benefit from skilled physical therapy services in order to address current deficits, restore PLOF, and improve QOL.      RE 3/31/25 details: Pt reports 65-75% improvement since initial evaluation. She reports she used to be in constant pain, and now it is every so often or of it gets flared from activity. Aggravating factors continue to be work and sleeping, as she wakes in the morning with pain sporadically. She is very conscious of her posture, which helps, but she states sometimes holding tension with good posture for long periods of time is aggravating. Alleviating factors include doing PT exercises, especially nerve glides, no monies, and chin tucks, as well as other stretches and stress management. The pain in her neck no longer wakes her up in the middle of the night. In regards to her headaches, she used to get them 3x a week, now only about once a week and in the morning or at the end of the day. Her headaches are now in the back of her head. She notices this aggravation with changes in weather, and work-related stress, which she feels she is able to manage on her  own better. She gets numbness and tingling in her L arm and hand about 2-3x a week especially when driving and when straining UE or laying on her arm. She sometimes has jaw pain but is better than at initial evaluation, not noticeable anymore. Objectively, patient has near full ROM in all directions with minimal to no pain. All ULTT was negative today, with much improved ROM in median nerve distribution. Recently added chin retraction extension with wiggle every hour or before pain comes on, which has made a big difference in patient's symptoms for the positive. Plan for PT to continue including postural control, DNF training, scap activation, RTC strengthening, rhythmic stab, and CKC. Pt would benefit from skilled physical therapy services in order to address current deficits, restore PLOF, and improve QOL.        RE 12/30/2024 details: Pt reports 65-75% improvement since initial evaluation. She reports she used to be in constant pain, and now it is every so often or of it gets flared from activity. Aggravating factors continue to be work and sleeping, as she wakes in the morning with pain. She is very conscious of her posture, which helps, but she states sometimes holding tension with good posture for long periods of time is aggravating. Alleviating factors include doing PT exercises, especially nerve glides, no monies, and chin tucks, as well as other stretches and stress management. The pain in her neck no longer wakes her up in the middle of the night. In regards to her headaches, she used to get them 3x a week, now only once a week and does not take regular meds for it, only Tylenol as necessary. Her headaches are now in the back of her head. She notices this aggravation with changes in weather, and work-related stress, which she feels she is able to manage on her own better. She gets numbness and tingling in her L arm and hand about once or twice a month and is aggravated by laying on her arm. She sometimes  "has jaw pain but is better than at initial evaluation. Aggravating factors for her jaw include types of food, stress, and if her headaches and neck pain are already present. Objectively, patient has near full ROM in all directions with minimal to no pain. All ULTT was negative today except L median nerve. Plan for PT to continue including postural control, DNF training, scap activation, and RTC strengthening. Pt would benefit from skilled physical therapy services in order to address current deficits, restore PLOF, and improve QOL.       IE 8/22/2024 details: Becky Hong is a pleasant 24 y.o. female who presents with neck pain.  Patient's greatest concerns are worry over not knowing what's wrong and wanting to avoid painkillers.  Pt is present for neck issues that have arose over the past couple of months. She started having migraines in 2022 which started more central at the top of her head, but recently have moved into the back of the head and into her shoulders. She feels this recent onset could be the start of a new job recently and built up stress and anxiety. Her neurologist that she sees for her migraines recommended PT. They also perform a brain MRI which showed a cavernoma, she will have a second MRI to rule out red flags. Excedrin helps her headaches but has not taken Maxalt yet as prescribed. She gets migraines about 3x a week, especially towards the end of her day, and feels they can be a result of the tightness in her neck and shoulders. Currently her pain is at the base of her neck 1/10 and reports it as \"someone grabbing or pressing,\" throbbing, and digging into her shoulderblades. She has noticed her neck pain wakes her in the middle of the night, but has decreased since getting a new pillow that allows her to lay more flat instead of propped. She sleeps on her L side or back. Other aggravating factors include bending down, anxiety stressors, turning head or body, looking up for sustained " period, looking down at work for sustained period. Alleviating factors include Voltaren, ice, heat, new pillow, and stretching. Denies previous injury to the neck. 5 Ds and 3 Ns: R eye feels strained sometimes, she feels disoriented if she turns her head fast, and sometimes gets tingling in her hands (she thinks related to her anxiety). Additionally, patient states she has had jaw pain since high school, as well as cracking, especially with yawning, chewing gum, and opening her mouth wide. 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 Pt goals for therapy are to learn different skills to do throughout the day relieve tension, and better manage symptoms.       Objectively, the patient displayed limitations in cervical mobility and ROM along with increased symptom sensitivity to movement and palpation. The primary movement problem is poor posture resulting in pain, muscle imbalances, and headaches and limiting her ability to work without symptoms. Etiologic factors include repetitive poor body mechanics, and increased anxiety/stress due to starting a new job.  Pt may be experiencing these symptoms and objective findings based on clinical presentation of cervical pain with mobility deficits, with radiculopathy, and with headaches, with possible TMJ involvement. No further referral is necessary at this time based upon examination results. Pt would benefit from skilled physical therapy services to address current deficits, improve quality of life, and restore PLOF with transition to home exercise program when appropriate. I expect she will increase cervical mobility/ROM and improve her posture while reporting decreased pain levels throughout the course of skilled PT.  Positive prognostic indicators include positive attitude toward recovery, good understanding of diagnosis and treatment plan options, and absence of observed red flags.  Negative prognostic indicators include chronicity of symptoms,  anxiety, and high symptom irritability.       Primary Impairments:  1) poor posture  2) decreased cervical mobility  3) decreased cervical ROM  4) poor ability to manage stress and anxiety  5) increased upper limb tension B     Function Based Goals:  Patient will be independent with HEP upon discharge. - met  Patient will be able to independently manage symptoms upon discharge. - met  Patient will resume prior level of function and home ADLs with minimal to no symptoms upon discharge. - met  Patient will be able to sleep through the night with minimal to no symptoms upon discharge. - met  Patient will be able to work a full work day with minimal to no symptoms upon discharge. - partially met  Patient will be able to drive with minimal to no symptoms upon discharge. - partially met    Impairment Based Goals:  Patient will increase cervical ROM to 10% limited as noted by therapist in 3 weeks. - met  Patient will increase cervical mobility as noted by therapist in 3 weeks. - met  Patient will increase postural awareness/control to good in 4 weeks. - met  Patient will demonstrate negative ULTT as noted by therapist in 4 weeks. - partially met          Patient Goals  Patient goals for therapy: increased strength, return to sport/leisure activities, decreased pain, increased motion, improved balance and independence with ADLs/IADLs  Patient goal: learn different skills to do throughout the day relieve tension, better manage symptoms        Plan  Patient would benefit from: skilled physical therapy  Referral necessary: No  Planned modality interventions: cryotherapy, electrical stimulation/Russian stimulation, thermotherapy: hydrocollator packs, TENS, low level laser therapy and traction    Planned therapy interventions: abdominal trunk stabilization, activity modification, balance, body mechanics training, breathing training, coordination, flexibility, functional ROM exercises, home exercise program, therapeutic exercise,  "therapeutic activities, stretching, strengthening, postural training, patient education, neuromuscular re-education, motor coordination training, massage, manual therapy, joint mobilization and nerve gliding    Frequency: Every other week  Duration in weeks: 12  Treatment plan discussed with: patient  Plan details: 1x every other wk over the next 3 months with HEP         Precautions: anxiety  [id number]  POC expires Unit limit Auth Expiration date PT/OT + Visit Limit?    BOMN N/a BOMN PCY                             Manuals 14 - 3/17 15 - 3/31 16 - 4/14 17-4/28 18-5/21  FOTO 68 19-6/10 20 - 6/24   STM KA cervical paraspinals, B upper traps and biceps tendon, anterior scalenes KA cervical paraspinals, B upper traps and biceps tendon, anterior scalenes RR cervical paraspinals, B upper traps, rhomboids, mid traps  EW cervical paraspinals, B upper traps, rhomboids, mid traps  EW cervical paraspinals, B upper traps, rhomboids, mid traps  EW cervical paraspinals, B upper traps, rhomboids, mid traps  EW cervical paraspinals, B upper traps, rhomboids, mid traps    UT / LS stretch          Cervical traction retraction extension          Cervical joint mobs       KA L side glide    P-A mob Thoracic P-A  Gr V Thoracic P-A  Gr IV-V RR Thoracic screw mobilization gr V  EW PROM EW ROM     Central glider          Cupping          Nerve glides  KA L median and ulnar n 20x  KA L median and ulnar n 20x   EW L median EW L median EW L median and ulnar    Headache Mobilizations   2x10        Reverse Headache Mobilization   2x10       Neuro Re-Ed          No monies      [place]  5\"x20 GTB 5\"x20 + chin tuck   Scap retraction      5\"x20    Cervical SNAG       10\"x10 rot   Cervical AROM          Cervical retraction 10x with extension and wiggle     5\"x20    JUAN PABLO 10\"x10      10\"x10    Diaphragmatic breathing          Nerve glides          FR protocol           Ball on wall           Body blade   Elbow flex at side 20\"x3 B   Elbow " "flex at side 20\"x3 B     Rows / LPD        GTB + tuck 3x10   Repeated cervical retraction extension           Wall slides   OTB 10\"x10   OTB 10\"x10     Prone YTI  Pball 10x ea  Pball 10x2 ea   Pball 10x2 ea     Scap pushup at wall  2x10   2x10     Cervical retraction with lift       2x10x5\"   Ther Ex          UBE Retro standing  6' Retro standing  6' Retro standing 6' Retro standing 6' Retro standing 6'  Retro standing 3'   RTC lift           Wall walks                                                            Ther Activity          education                    Gait Training                              Modalities                                                 "

## 2025-06-26 ENCOUNTER — SOCIAL WORK (OUTPATIENT)
Dept: BEHAVIORAL/MENTAL HEALTH CLINIC | Facility: CLINIC | Age: 25
End: 2025-06-26
Payer: COMMERCIAL

## 2025-06-26 DIAGNOSIS — F41.1 GAD (GENERALIZED ANXIETY DISORDER): ICD-10-CM

## 2025-06-26 DIAGNOSIS — F42.2 MIXED OBSESSIONAL THOUGHTS AND ACTS: Primary | ICD-10-CM

## 2025-06-26 PROCEDURE — 90837 PSYTX W PT 60 MINUTES: CPT | Performed by: COUNSELOR

## 2025-06-26 NOTE — PSYCH
Behavioral Health Psychotherapy Progress Note    Psychotherapy Provided: Individual Psychotherapy     1. Mixed obsessional thoughts and acts        2. POWER (generalized anxiety disorder)            Goals addressed in session: Goal 1, Goal 2, and Goal 3      DATA: Met with Becky for an individual therapy session.  We spent the first portion of the session checking and concerning how she has been doing.  Becky shared that she has been having a difficult past couple of weeks.  She shared how she feels her symptoms have recently been more magnified.  She also shared that she often feels worse just prior to her menstrual cycle which is expected to come shortly.  She shared about how she recently struggled with a panic attack while she was driving.  She shared that she needed to call her parents to gain reassurance and they ended up picking her up.  We spent time discussing how to have a conversation with her parents to help them better understand the kind of support that she needs as opposed to them attempting to resolve the situation.  We discussed providing them with strategies that will help her refrain from having them provide reassurance but rather prompt her to provide her own reassurance.  We discussed helping them to point her towards her go to coping strategies such as grounding deep breathing or progressive muscle relaxation to help her self calm and hopefully regain the confidence to drive herself home. We also spent time talking about medication management and she was receptive to receiving a referral.  During this session, this clinician used the following therapeutic modalities: Client-centered Therapy, Cognitive Behavioral Therapy, and Cognitive Processing Therapy    Substance Abuse was not addressed during this session. If the client is diagnosed with a co-occurring substance use disorder, please indicate any changes in the frequency or amount of use: NA. Stage of change for addressing substance use  "diagnoses: No substance use/Not applicable    ASSESSMENT:  Becky Hong presents with a Euthymic/ normal and Anxious mood.     her affect is Normal range and intensity, which is congruent, with her mood and the content of the session. The client has made progress on their goals.    Becky was able to identify some of her cognitive distortions, but acknowledged that at times she is unable to believe that they are not true. Becky Hong presents with a none risk of suicide, none risk of self-harm, and none risk of harm to others.    For any risk assessment that surpasses a \"low\" rating, a safety plan must be developed.    A safety plan was indicated: no  If yes, describe in detail NA    PLAN: Between sessions, Becky Hong will work on communicating with her family about ways that will better support her.. At the next session, the therapist will use Client-centered Therapy, Cognitive Behavioral Therapy, and Cognitive Processing Therapy to address learning thought stopping strategies.    Behavioral Health Treatment Plan and Discharge Planning: Becky Hong is aware of and agrees to continue to work on their treatment plan. They have identified and are working toward their discharge goals. yes    Depression Follow-up Plan Completed: Not applicable    Visit start and stop times:    06/26/25  Start Time: 1200  Stop Time: 1253  Total Visit Time: 53 minutes  "

## 2025-06-26 NOTE — BH TREATMENT PLAN
Outpatient Behavioral Health Psychotherapy Treatment Plan     Becky Hong  2000      Date of Initial Psychotherapy Assessment: 12/05/24   Date of Current Treatment Plan: 6/26/25  Treatment Plan Target Date: 12/25/25  Treatment Plan Expiration Date:12/25/25     Diagnosis:   1. POWER (generalized anxiety disorder)                Area(s) of Need: Managing anxiety better and feeling like I can be my authentic self     Long Term Goal 1 (in the client's own words): Reduce overall frequency, intensity, and duration of the anxiety so that daily functioning is not impaired.     Stage of Change: Contemplation     Target Date for completion: TBD             Anticipated therapeutic modalities: CBT, Motivational interviewing, Client centered             People identified to complete this goal: Colt Pena                    Objective 1: (identify the means of measuring success in meeting the objective): Identify and implement 5 new calming skills to reduce anxiety and anxiety symptoms                    Objective 2: (identify the means of measuring success in meeting the objective): Identify, challenge, and replace fearful self-talk with positive realistic and empowering self-talk.      Long Term Goal 2 (in the client's own words): Learn to be less of a people pleaser     Stage of Change: Contemplation     Target Date for completion: TBD             Anticipated therapeutic modalities: CBT, Motivational interviewing, Client centered             People identified to complete this goal: Colt Pena                    Objective 1: (identify the means of measuring success in meeting the objective): Verbalize an increased awareness of negative self talk                    Objective 2: (identify the means of measuring success in meeting the objective): Increase the frequency of speaking up with confidence in social situations.     Long Term Goal 3 (in the client's own words): Reduce the frequency, intensity,  and duration of obsessions.     Stage of Change: Contemplation     Target Date for completion: TBD             Anticipated therapeutic modalities: CBT, Motivational interviewing, Client centered             People identified to complete this goal: Becky Therapist                    Objective 1: (identify the means of measuring success in meeting the objective): Verbalize an understanding of OCD and the rationale for its treatment.                    Objective 2: (identify the means of measuring success in meeting the objective): Learn cognitive coping strategies to manage obsessions.     I am currently under the care of a [facility] psychiatric provider: no     My [facility] psychiatric provider is: NA     I am currently taking psychiatric medications: No     I feel that I will be ready for discharge from mental health care when I reach the following (measurable goal/objective): I am able to manage my feelings of anxiety and cope where it does not consume my life.     For children and adults who have a legal guardian:          Has there been any change to custody orders and/or guardianship status? NA. If yes, attach updated documentation.     I have created my Crisis Plan and have been offered a copy of this plan     Behavioral Health Treatment Plan [facility]: Diagnosis and Treatment Plan explained to Becky Hong acknowledges an understanding of their diagnosis. Becky Hong agrees to this treatment plan.     I have been offered a copy of this Treatment Plan. yes

## 2025-07-08 ENCOUNTER — OFFICE VISIT (OUTPATIENT)
Dept: PHYSICAL THERAPY | Facility: OTHER | Age: 25
End: 2025-07-08
Payer: COMMERCIAL

## 2025-07-08 DIAGNOSIS — M54.2 CERVICALGIA: Primary | ICD-10-CM

## 2025-07-08 PROCEDURE — 97110 THERAPEUTIC EXERCISES: CPT

## 2025-07-08 PROCEDURE — 97140 MANUAL THERAPY 1/> REGIONS: CPT

## 2025-07-08 PROCEDURE — 97530 THERAPEUTIC ACTIVITIES: CPT

## 2025-07-08 NOTE — PROGRESS NOTES
"Daily Note     Today's date: 2025  Patient name: Becky Hong  : 2000  MRN: [id number]  Referring provider: Teddy Valadez DO  Dx:   Encounter Diagnosis     ICD-10-CM    1. Cervicalgia  M54.2                      Subjective: \"I have been pretty good overall, I had 4 days off from work because of the holiday so I think that helped a lot. I think I would like to focus on the TMJ like we discussed last time.\" Patient states she gets clicking with yawning and cracking with chewing on L > R but B TMJ. She does note that she finds herself clenching throughout the day depending on the level of stress. She is unsure if she grinds her teeth at night but she does wake up sometimes feeling tightness in her TMJ. She often has \"sinus pressure\" and feels it in her teeth and jaws. She reports not being able to chew gum or eat for a long time due to cracking and pain, and often gets temporalis pain with gum chewing. She also sometimes feels tightness in opening her mouth (such as a yawn) and is afraid to stretch it too far. Denies tinnitus or fullness in ears, has had normal hearing tests.        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 We discussed treatment starting with warm compresses, massage, saying \"Tori,\" and controlled opening with the tongue on the roof of the mouth. Pt states she was told a long time ago that her jaws are uneven and the only way to correct would be to have surgery, which patient does not want to do. I plan to continue with these treatments in PT and will refer out to TMJ specialist or dentist if treatments are not beneficial. " "      Plan: Continue per plan of care.      Precautions: anxiety  [id number]  POC expires Unit limit Auth Expiration date PT/OT + Visit Limit?   9/17/25 BOMN N/a BOMN PCY                             Manuals 17-4/28 18-5/21  FOTO 68 19-6/10 20 - 6/24 21 - 7/8   STM EW cervical paraspinals, B upper traps, rhomboids, mid traps  EW cervical paraspinals, B upper traps, rhomboids, mid traps  EW cervical paraspinals, B upper traps, rhomboids, mid traps  EW cervical paraspinals, B upper traps, rhomboids, mid traps     UT / LS stretch        Cervical traction retraction extension        Cervical joint mobs    KA L side glide     P-A mob EW PROM EW ROM      Central glider        Cupping        Nerve glides  EW L median EW L median EW L median and ulnar     Headache Mobilizations        Reverse Headache Mobilization        TMJ Evaluation     KA   Neuro Re-Ed        No monies   [place]  5\"x20 GTB 5\"x20 + chin tuck    Scap retraction   5\"x20     Cervical SNAG    10\"x10 rot    Cervical AROM        Cervical retraction   5\"x20     JUAN PABLO   10\"x10     Diaphragmatic breathing        Nerve glides        FR protocol         Ball on wall         Body blade   Elbow flex at side 20\"x3 B      Rows / LPD     GTB + tuck 3x10    Repeated cervical retraction extension         Wall slides   OTB 10\"x10      Prone YTI   Pball 10x2 ea      Scap pushup at wall  2x10      Cervical retraction with lift    2x10x5\"    Ther Ex        UBE Retro standing 6' Retro standing 6'  Retro standing 3' Retro standing 5'   RTC lift         Wall walks                                                Ther Activity        education     TMJ pathology, treatment - iliana / n, warm compress, tongue on roof of mouth with opening           Gait Training                        Modalities                                             "

## 2025-07-10 ENCOUNTER — SOCIAL WORK (OUTPATIENT)
Dept: BEHAVIORAL/MENTAL HEALTH CLINIC | Facility: CLINIC | Age: 25
End: 2025-07-10
Payer: COMMERCIAL

## 2025-07-10 DIAGNOSIS — F42.2 MIXED OBSESSIONAL THOUGHTS AND ACTS: Primary | ICD-10-CM

## 2025-07-10 DIAGNOSIS — F41.1 GAD (GENERALIZED ANXIETY DISORDER): ICD-10-CM

## 2025-07-10 PROCEDURE — 90837 PSYTX W PT 60 MINUTES: CPT | Performed by: COUNSELOR

## 2025-07-10 NOTE — PSYCH
"Behavioral Health Psychotherapy Progress Note    Psychotherapy Provided: Individual Psychotherapy     1. Mixed obsessional thoughts and acts        2. POWER (generalized anxiety disorder)            Goals addressed in session: Goal 1, Goal 2, and Goal 3      DATA: Met with Becky for an individual therapy session.  We spent the first portion of the session checking in concerning how he has been doing.  She shared that her anxiety has been continuing to be persistently difficult for her to manage with a lot of continual \"worry talk\" going on in her head.  She shared that for her worries feel \"louder\" over the past year.  She shared that she is continually being triggered when ever there is any reference to illness or sickness.  We discussed positive coping strategies for which Tiny has been implementing regularly such as challenging and delaying, but reports becoming overwhelmed with the continual struggle.  We discussed how fighting with her anxiety can intensity her feelings and how simply accepting the uncomfortable may be a better approach.  We discussed making a referral for medication management and she was in agreement.  During this session, this clinician used the following therapeutic modalities: Client-centered Therapy, Cognitive Behavioral Therapy, and Cognitive Processing Therapy    Substance Abuse was not addressed during this session. If the client is diagnosed with a co-occurring substance use disorder, please indicate any changes in the frequency or amount of use: NA. Stage of change for addressing substance use diagnoses: No substance use/Not applicable    ASSESSMENT:  Becky Hong presents with a Euthymic/ normal and Anxious mood.     her affect is Normal range and intensity, which is congruent, with her mood and the content of the session. The client has made progress on their goals.    Becky has been regularly putting time in between her urges to act on her compulsions. Becky JACKSON" "Gely presents with a none risk of suicide, none risk of self-harm, and none risk of harm to others.    For any risk assessment that surpasses a \"low\" rating, a safety plan must be developed.    A safety plan was indicated: no  If yes, describe in detail NA    PLAN: Between sessions, Becky Hong will work on having a positive attitude about her upcoming trip and enjoy her vacation with her friend.. At the next session, the therapist will use Client-centered Therapy, Cognitive Behavioral Therapy, and Cognitive Processing Therapy to address managing worry.    Behavioral Health Treatment Plan and Discharge Planning: Becky Hong is aware of and agrees to continue to work on their treatment plan. They have identified and are working toward their discharge goals. yes    Depression Follow-up Plan Completed: Not applicable    Visit start and stop times:    07/10/25  Start Time: 1700  Stop Time: 1753  Total Visit Time: 53 minutes  "

## 2025-07-16 ENCOUNTER — TELEPHONE (OUTPATIENT)
Age: 25
End: 2025-07-16

## 2025-07-16 NOTE — TELEPHONE ENCOUNTER
Spoke to pt and scheduled for med mgmt. No intake needed. [facility]

## 2025-07-21 DIAGNOSIS — M54.2 CERVICALGIA: Primary | ICD-10-CM

## 2025-07-21 DIAGNOSIS — G43.109 MIGRAINE WITH AURA AND WITHOUT STATUS MIGRAINOSUS, NOT INTRACTABLE: ICD-10-CM

## 2025-07-21 DIAGNOSIS — M54.10 RADICULOPATHY AFFECTING UPPER EXTREMITY: ICD-10-CM

## 2025-07-22 ENCOUNTER — APPOINTMENT (OUTPATIENT)
Dept: PHYSICAL THERAPY | Facility: OTHER | Age: 25
End: 2025-07-22
Payer: COMMERCIAL

## 2025-07-24 ENCOUNTER — APPOINTMENT (OUTPATIENT)
Dept: PHYSICAL THERAPY | Facility: OTHER | Age: 25
End: 2025-07-24
Payer: COMMERCIAL

## 2025-07-28 ENCOUNTER — SOCIAL WORK (OUTPATIENT)
Dept: BEHAVIORAL/MENTAL HEALTH CLINIC | Facility: CLINIC | Age: 25
End: 2025-07-28
Payer: COMMERCIAL

## 2025-07-28 DIAGNOSIS — F41.1 GAD (GENERALIZED ANXIETY DISORDER): Primary | ICD-10-CM

## 2025-07-28 DIAGNOSIS — F42.2 MIXED OBSESSIONAL THOUGHTS AND ACTS: ICD-10-CM

## 2025-07-28 PROCEDURE — 90837 PSYTX W PT 60 MINUTES: CPT | Performed by: COUNSELOR

## 2025-07-31 ENCOUNTER — OFFICE VISIT (OUTPATIENT)
Dept: PHYSICAL THERAPY | Facility: OTHER | Age: 25
End: 2025-07-31
Payer: COMMERCIAL

## 2025-07-31 DIAGNOSIS — M54.2 CERVICALGIA: Primary | ICD-10-CM

## 2025-07-31 PROCEDURE — 97140 MANUAL THERAPY 1/> REGIONS: CPT

## 2025-07-31 PROCEDURE — 97110 THERAPEUTIC EXERCISES: CPT

## 2025-07-31 PROCEDURE — 97112 NEUROMUSCULAR REEDUCATION: CPT

## 2025-08-06 DIAGNOSIS — K21.9 GASTROESOPHAGEAL REFLUX DISEASE, UNSPECIFIED WHETHER ESOPHAGITIS PRESENT: ICD-10-CM

## 2025-08-06 DIAGNOSIS — R11.0 NAUSEA: ICD-10-CM

## 2025-08-07 RX ORDER — OMEPRAZOLE 40 MG/1
40 CAPSULE, DELAYED RELEASE ORAL DAILY
Qty: 90 CAPSULE | Refills: 1 | Status: SHIPPED | OUTPATIENT
Start: 2025-08-07

## 2025-08-12 ENCOUNTER — SOCIAL WORK (OUTPATIENT)
Dept: BEHAVIORAL/MENTAL HEALTH CLINIC | Facility: CLINIC | Age: 25
End: 2025-08-12
Payer: COMMERCIAL

## 2025-08-18 ENCOUNTER — TELEPHONE (OUTPATIENT)
Age: 25
End: 2025-08-18

## 2025-08-19 ENCOUNTER — TELEMEDICINE (OUTPATIENT)
Dept: PSYCHIATRY | Facility: CLINIC | Age: 25
End: 2025-08-19
Payer: COMMERCIAL

## 2025-08-19 DIAGNOSIS — F41.0 PANIC ATTACKS: ICD-10-CM

## 2025-08-19 DIAGNOSIS — F42.2 MIXED OBSESSIONAL THOUGHTS AND ACTS: Primary | ICD-10-CM

## 2025-08-19 DIAGNOSIS — F41.1 GAD (GENERALIZED ANXIETY DISORDER): ICD-10-CM

## 2025-08-19 PROCEDURE — 90792 PSYCH DIAG EVAL W/MED SRVCS: CPT | Performed by: PSYCHIATRY & NEUROLOGY

## 2025-08-19 RX ORDER — CETIRIZINE HYDROCHLORIDE 10 MG/1
10 TABLET, CHEWABLE ORAL
COMMUNITY

## 2025-08-19 RX ORDER — SERTRALINE HYDROCHLORIDE 25 MG/1
25 TABLET, FILM COATED ORAL DAILY
Qty: 14 TABLET | Refills: 0 | Status: SHIPPED | OUTPATIENT
Start: 2025-08-19

## 2025-08-21 ENCOUNTER — OFFICE VISIT (OUTPATIENT)
Dept: PHYSICAL THERAPY | Facility: OTHER | Age: 25
End: 2025-08-21
Payer: COMMERCIAL

## 2025-08-21 DIAGNOSIS — M54.2 CERVICALGIA: Primary | ICD-10-CM

## 2025-08-21 PROCEDURE — 97140 MANUAL THERAPY 1/> REGIONS: CPT

## 2025-08-21 PROCEDURE — 97112 NEUROMUSCULAR REEDUCATION: CPT

## 2025-08-21 PROCEDURE — 97110 THERAPEUTIC EXERCISES: CPT
